# Patient Record
Sex: FEMALE | Race: WHITE | NOT HISPANIC OR LATINO | Employment: OTHER | URBAN - METROPOLITAN AREA
[De-identification: names, ages, dates, MRNs, and addresses within clinical notes are randomized per-mention and may not be internally consistent; named-entity substitution may affect disease eponyms.]

---

## 2017-01-04 ENCOUNTER — APPOINTMENT (OUTPATIENT)
Dept: PHYSICAL THERAPY | Facility: CLINIC | Age: 62
End: 2017-01-04
Payer: MEDICARE

## 2017-01-04 PROCEDURE — 97140 MANUAL THERAPY 1/> REGIONS: CPT

## 2017-01-05 ENCOUNTER — APPOINTMENT (OUTPATIENT)
Dept: PHYSICAL THERAPY | Facility: CLINIC | Age: 62
End: 2017-01-05
Payer: MEDICARE

## 2017-01-05 PROCEDURE — 97140 MANUAL THERAPY 1/> REGIONS: CPT

## 2017-01-06 ENCOUNTER — APPOINTMENT (OUTPATIENT)
Dept: PHYSICAL THERAPY | Facility: CLINIC | Age: 62
End: 2017-01-06
Payer: MEDICARE

## 2017-01-06 PROCEDURE — 97140 MANUAL THERAPY 1/> REGIONS: CPT

## 2017-01-09 ENCOUNTER — APPOINTMENT (OUTPATIENT)
Dept: PHYSICAL THERAPY | Facility: CLINIC | Age: 62
End: 2017-01-09
Payer: MEDICARE

## 2017-01-09 PROCEDURE — 97140 MANUAL THERAPY 1/> REGIONS: CPT

## 2017-01-11 ENCOUNTER — APPOINTMENT (OUTPATIENT)
Dept: PHYSICAL THERAPY | Facility: CLINIC | Age: 62
End: 2017-01-11
Payer: MEDICARE

## 2017-01-11 PROCEDURE — G8990 OTHER PT/OT CURRENT STATUS: HCPCS

## 2017-01-11 PROCEDURE — G8991 OTHER PT/OT GOAL STATUS: HCPCS

## 2017-01-11 PROCEDURE — 97140 MANUAL THERAPY 1/> REGIONS: CPT

## 2017-01-13 ENCOUNTER — APPOINTMENT (OUTPATIENT)
Dept: PHYSICAL THERAPY | Facility: CLINIC | Age: 62
End: 2017-01-13
Payer: MEDICARE

## 2017-01-13 PROCEDURE — 97140 MANUAL THERAPY 1/> REGIONS: CPT

## 2017-01-16 ENCOUNTER — APPOINTMENT (OUTPATIENT)
Dept: PHYSICAL THERAPY | Facility: CLINIC | Age: 62
End: 2017-01-16
Payer: MEDICARE

## 2017-01-18 ENCOUNTER — APPOINTMENT (OUTPATIENT)
Dept: PHYSICAL THERAPY | Facility: CLINIC | Age: 62
End: 2017-01-18
Payer: MEDICARE

## 2017-01-18 PROCEDURE — 97140 MANUAL THERAPY 1/> REGIONS: CPT

## 2017-01-20 ENCOUNTER — APPOINTMENT (OUTPATIENT)
Dept: PHYSICAL THERAPY | Facility: CLINIC | Age: 62
End: 2017-01-20
Payer: MEDICARE

## 2017-01-20 PROCEDURE — 97140 MANUAL THERAPY 1/> REGIONS: CPT

## 2017-01-23 ENCOUNTER — APPOINTMENT (OUTPATIENT)
Dept: PHYSICAL THERAPY | Facility: CLINIC | Age: 62
End: 2017-01-23
Payer: MEDICARE

## 2017-01-23 PROCEDURE — 97140 MANUAL THERAPY 1/> REGIONS: CPT

## 2017-01-24 ENCOUNTER — APPOINTMENT (OUTPATIENT)
Dept: PHYSICAL THERAPY | Facility: CLINIC | Age: 62
End: 2017-01-24
Payer: MEDICARE

## 2017-01-24 PROCEDURE — 97140 MANUAL THERAPY 1/> REGIONS: CPT

## 2017-01-31 ENCOUNTER — APPOINTMENT (OUTPATIENT)
Dept: PHYSICAL THERAPY | Facility: CLINIC | Age: 62
End: 2017-01-31
Payer: MEDICARE

## 2017-01-31 PROCEDURE — 97140 MANUAL THERAPY 1/> REGIONS: CPT

## 2017-02-01 ENCOUNTER — APPOINTMENT (OUTPATIENT)
Dept: PHYSICAL THERAPY | Facility: CLINIC | Age: 62
End: 2017-02-01
Payer: MEDICARE

## 2017-02-01 PROCEDURE — 97140 MANUAL THERAPY 1/> REGIONS: CPT

## 2017-02-02 ENCOUNTER — APPOINTMENT (OUTPATIENT)
Dept: PHYSICAL THERAPY | Facility: CLINIC | Age: 62
End: 2017-02-02
Payer: MEDICARE

## 2017-02-02 PROCEDURE — 97140 MANUAL THERAPY 1/> REGIONS: CPT

## 2017-09-07 ENCOUNTER — APPOINTMENT (OUTPATIENT)
Dept: PHYSICAL THERAPY | Facility: CLINIC | Age: 62
End: 2017-09-07
Payer: MEDICARE

## 2017-09-07 PROCEDURE — G8990 OTHER PT/OT CURRENT STATUS: HCPCS

## 2017-09-07 PROCEDURE — 97162 PT EVAL MOD COMPLEX 30 MIN: CPT

## 2017-09-07 PROCEDURE — G8991 OTHER PT/OT GOAL STATUS: HCPCS

## 2017-09-13 ENCOUNTER — APPOINTMENT (OUTPATIENT)
Dept: PHYSICAL THERAPY | Facility: CLINIC | Age: 62
End: 2017-09-13
Payer: MEDICARE

## 2017-09-13 PROCEDURE — 97140 MANUAL THERAPY 1/> REGIONS: CPT

## 2017-09-14 ENCOUNTER — APPOINTMENT (OUTPATIENT)
Dept: PHYSICAL THERAPY | Facility: CLINIC | Age: 62
End: 2017-09-14
Payer: MEDICARE

## 2017-09-14 PROCEDURE — 97140 MANUAL THERAPY 1/> REGIONS: CPT

## 2017-09-15 ENCOUNTER — APPOINTMENT (OUTPATIENT)
Dept: PHYSICAL THERAPY | Facility: CLINIC | Age: 62
End: 2017-09-15
Payer: MEDICARE

## 2017-09-15 PROCEDURE — 97140 MANUAL THERAPY 1/> REGIONS: CPT

## 2017-09-18 ENCOUNTER — APPOINTMENT (OUTPATIENT)
Dept: PHYSICAL THERAPY | Facility: CLINIC | Age: 62
End: 2017-09-18
Payer: MEDICARE

## 2017-09-18 PROCEDURE — 97140 MANUAL THERAPY 1/> REGIONS: CPT

## 2017-09-20 ENCOUNTER — APPOINTMENT (OUTPATIENT)
Dept: PHYSICAL THERAPY | Facility: CLINIC | Age: 62
End: 2017-09-20
Payer: MEDICARE

## 2017-09-20 PROCEDURE — 97140 MANUAL THERAPY 1/> REGIONS: CPT

## 2017-09-22 ENCOUNTER — APPOINTMENT (OUTPATIENT)
Dept: PHYSICAL THERAPY | Facility: CLINIC | Age: 62
End: 2017-09-22
Payer: MEDICARE

## 2017-09-22 PROCEDURE — 97140 MANUAL THERAPY 1/> REGIONS: CPT

## 2017-09-26 ENCOUNTER — APPOINTMENT (OUTPATIENT)
Dept: PHYSICAL THERAPY | Facility: CLINIC | Age: 62
End: 2017-09-26
Payer: MEDICARE

## 2017-09-26 PROCEDURE — 97140 MANUAL THERAPY 1/> REGIONS: CPT

## 2017-09-27 ENCOUNTER — APPOINTMENT (OUTPATIENT)
Dept: PHYSICAL THERAPY | Facility: CLINIC | Age: 62
End: 2017-09-27
Payer: MEDICARE

## 2017-09-27 PROCEDURE — 97140 MANUAL THERAPY 1/> REGIONS: CPT

## 2017-09-29 ENCOUNTER — APPOINTMENT (OUTPATIENT)
Dept: PHYSICAL THERAPY | Facility: CLINIC | Age: 62
End: 2017-09-29
Payer: MEDICARE

## 2017-09-29 PROCEDURE — 97140 MANUAL THERAPY 1/> REGIONS: CPT

## 2017-10-13 ENCOUNTER — APPOINTMENT (OUTPATIENT)
Dept: PHYSICAL THERAPY | Facility: CLINIC | Age: 62
End: 2017-10-13
Payer: MEDICARE

## 2017-10-13 PROCEDURE — G8990 OTHER PT/OT CURRENT STATUS: HCPCS

## 2017-10-13 PROCEDURE — 97140 MANUAL THERAPY 1/> REGIONS: CPT

## 2017-10-13 PROCEDURE — G8991 OTHER PT/OT GOAL STATUS: HCPCS

## 2017-10-16 ENCOUNTER — APPOINTMENT (OUTPATIENT)
Dept: PHYSICAL THERAPY | Facility: CLINIC | Age: 62
End: 2017-10-16
Payer: MEDICARE

## 2017-10-18 ENCOUNTER — APPOINTMENT (OUTPATIENT)
Dept: PHYSICAL THERAPY | Facility: CLINIC | Age: 62
End: 2017-10-18
Payer: MEDICARE

## 2017-10-18 PROCEDURE — 97140 MANUAL THERAPY 1/> REGIONS: CPT

## 2017-10-20 ENCOUNTER — APPOINTMENT (OUTPATIENT)
Dept: PHYSICAL THERAPY | Facility: CLINIC | Age: 62
End: 2017-10-20
Payer: MEDICARE

## 2017-10-20 PROCEDURE — 97140 MANUAL THERAPY 1/> REGIONS: CPT

## 2017-10-23 ENCOUNTER — APPOINTMENT (OUTPATIENT)
Dept: PHYSICAL THERAPY | Facility: CLINIC | Age: 62
End: 2017-10-23
Payer: MEDICARE

## 2017-10-23 PROCEDURE — 97140 MANUAL THERAPY 1/> REGIONS: CPT

## 2017-10-26 ENCOUNTER — APPOINTMENT (OUTPATIENT)
Dept: PHYSICAL THERAPY | Facility: CLINIC | Age: 62
End: 2017-10-26
Payer: MEDICARE

## 2017-10-26 PROCEDURE — 97140 MANUAL THERAPY 1/> REGIONS: CPT

## 2018-04-24 ENCOUNTER — TRANSCRIBE ORDERS (OUTPATIENT)
Dept: PHYSICAL THERAPY | Facility: CLINIC | Age: 63
End: 2018-04-24

## 2018-04-24 ENCOUNTER — EVALUATION (OUTPATIENT)
Dept: PHYSICAL THERAPY | Facility: CLINIC | Age: 63
End: 2018-04-24
Payer: MEDICARE

## 2018-04-24 DIAGNOSIS — M54.31 BILATERAL SCIATICA: Primary | ICD-10-CM

## 2018-04-24 DIAGNOSIS — I89.0 LYMPHEDEMA: Primary | ICD-10-CM

## 2018-04-24 DIAGNOSIS — M54.32 SCIATICA OF LEFT SIDE: ICD-10-CM

## 2018-04-24 DIAGNOSIS — M54.32 BILATERAL SCIATICA: Primary | ICD-10-CM

## 2018-04-24 PROCEDURE — G8979 MOBILITY GOAL STATUS: HCPCS | Performed by: PHYSICAL THERAPIST

## 2018-04-24 PROCEDURE — G8978 MOBILITY CURRENT STATUS: HCPCS | Performed by: PHYSICAL THERAPIST

## 2018-04-24 PROCEDURE — 97163 PT EVAL HIGH COMPLEX 45 MIN: CPT | Performed by: PHYSICAL THERAPIST

## 2018-04-24 NOTE — LETTER
2018    Yusef Mykel, [address] [facility] [address]    Patient: Francisca Chavez   YOB: 1955   Date of Visit: 2018     Encounter Diagnosis     ICD-10-CM    1  Lymphedema I89 0    2  Sciatica of left side M54 32        Dear Dr Yuri Mckeon:    Please review the attached Plan of Care from [facility] recent visit  Please verify that you agree therapy should continue by signing the attached document and sending it back to our office  If you have any questions or concerns, please don't hesitate to call  Sincerely,    Alex Arboleda, PT      Referring Provider:      I certify that I have read the below Plan of Care and certify the need for these services furnished under this plan of treatment while under my care  Yusef Mykel, [address] [facility] [address] [place] [address] [address]: [phone]          PT Evaluation     Today's date: 2018  Patient name: Francisca Chavez  : 1955  MRN: [id number]  Referring provider: Sheldon Cheatham MD  Dx:   Encounter Diagnosis     ICD-10-CM    1  Lymphedema I89 0    2  Sciatica of left side M54 32                   Assessment  Impairments: abnormal gait, activity intolerance, impaired physical strength, lacks appropriate home exercise program and pain with function  Other impairment: R UE lymphedema  Functional limitations: Requires SPC for amb  Assessment details: Pt presents w/ generalized B LE weakness, poor endurance, limited LB ROM and poor core activation and posturing along w/ L UE lymphedema of chronic nature related to breast CA hx   Pt had recent hospitalization and also home care PT to address ambulation limitations requiring cont'd use of SPC  Pt also anticipates upcoming partial bowel resection related to diverticulitis  L UE lymphedema has increased since last assessment predominantly affecting L elbow and proximal thru upper arm  Skilled PT is advised utilizing full CDT protocol including MLD and compression bandaging but holding abdominal sequence secondary to recent diagnosis of abdominal aortic aneurysm and diverticulitis hx  Skilled PT is also advised to manage chronic LBP and DDD w/ associated sciatica sx's w/ emphasis on posturing, core stab, and PN mngmt  Understanding of Dx/Px/POC: good   Prognosis: good    Goals  LB Goals:  1  Increase LB ROM >25% within 2 wks  2  Increase LB ROM >50% within 2-4 wks  3  Increase LB ROM to WNL's within 4 wks  4  Increase LB strength + 1 grade within 2-4 wks  5  Increase LB strength to WNL's within 4 wks  6  Reduce PN <1/10 w/ all activity within 4 wks  7  Return to normal functional activity potential within 4 wks  1  Pt I in self skin care within 2-4 wks  2  Pt I in self MLD within 4 wks  3  Pt I in don/doffing LE compression garments within 4 wks  4  Volume reduction of affected limb by >25% within 2-4 wks  5  Volume reduction of affected limb by >50% within 4 wks  6  Pt I in phase 2 CDT protocol including self MLD, skin maintenance, and don/doffing     garments by discharge    Plan  Patient would benefit from: skilled PT  Planned therapy interventions: manual therapy, massage, joint mobilization, strengthening, stretching, therapeutic exercise, patient education and home exercise program  Frequency: 3x week  Duration in visits: 15  Duration in weeks: 5  Treatment plan discussed with: patient and family        Subjective Evaluation    History of Present Illness  Mechanism of injury: Pt admits hospitalization for acute sciatica Feb 1-4, '18, followed by 1 spinal injection w/ PN mngmt w/ reduction of PN severity  Home care PT initiated to improved amb, strengthening, and mobility w/ use of SPC, now d/c since 4-23-18  Primary c/o at present is 5/10 "overall" Pn,  admitting c/s Pn, LBP, and PN in R UE (related to lymphedema)  L LE PN also cited related to sciatica sx's   Pt utilizing lidocaine patches to address persistent c/s Pn   Pt reports anticipated surgical intervention to manage diverticulitis in the upcoming future  Primary concerns are R UE lymphedema management, L LE sciatica, LB PN and generalized weakness, need of SPC for amb due to poor endurance  Quality of life: poor    Pain  Current pain ratin  At best pain ratin  At worst pain ratin  Location: LB, C/S, L LE, R UE  Quality: dull ache, discomfort, sharp, squeezing, tight, radiating and needle-like  Aggravating factors: walking, standing and nothing  Progression: improved      Diagnostic Tests  MRI studies: abnormal  Treatments  Previous treatment: home therapy  Discharged from (in last 30 days): home health care  Patient Goals  Patient goals for therapy: increased strength, independence with ADLs/IADLs, return to sport/leisure activities, decreased pain and decreased edema  Patient goal: Improve endurance      Flowsheet Rows    Flowsheet Row Most Recent Value   girth measurments   Extremity   Upper extremity   Index Finger   L Index Finger Initial Girth  5 25 cm   R Index Finger Initial Girth  5 5 cm   Palmar Crease   L Palmar Crease Initial Girth  17 5 cm   R Palmar Crease Initial Girth  17 5 cm   Wrist   L Wrist Initial Girth  14 5 cm   R Wrist Initial Girth  15 cm   W+10cm   L W+10cm Initial Girth  21 5 cm   R W+10cm Initial Girth  21 5 cm   W+20cm   L W+20cm Initial Girth  23 75 cm   R W+20cm Initial Girth  23 75 cm   W+30cm   L W+30cm Initial Girth  29 25 cm   R W+30cm Initial Girth  30 5 cm   Elbow Joint   L Elbow Joint Initial Girth  23 cm   R Elbow Joint Initial Girth  23 5 cm   W+40cm   L W+40cm Initial Girth  31 cm   R W+40cm Initial Girth  34 5 cm            Objective     Palpation     Additional Palpation Details  Moderate ttp B iliac crest into distal LB paraspinals w/ mod restriction B PSIS region       Active Range of Motion     Additional Active Range of Motion Details  LB flexion WNL  LB extension limited 15%  LB SB limited 25% L and R    Muscle Activation     Additional Muscle Activation Details  Poor core utilization w/ limited LB extensor and abdominal strength 3+/5      General Comments     Ankle/Foot Comments   Physical assessment:  Palpation: mild heaviness to R upper arm  Skin Mobility: Mild restriction and fibrosis R upper arm  Skin color: WNL, minimal redness  Pitting: Minimal  Wound presence: none  Wound size: n/a  Wound color: n/a  Stemmer's Sign: +  Gait assessment: WNL w/ SPC  Transfer status:  WNL  Ability to don/doff clothing/garments: I w/ mild difficulty      Flowsheet Rows    Flowsheet Row Most Recent Value   PT/OT G-Codes   Current Score  33   Projected Score  46   FOTO information reviewed  Yes   Assessment Type  Evaluation   G code set  Mobility: Walking & Moving Around   Mobility: Walking and Moving Around Current Status ()  CL   Mobility: Walking and Moving Around Goal Status ()  CK          Precautions: R UE lymphedema    Daily Treatment Diary     Manual                                                                                   Exercise Diary                                                                                                                                                                                                                                                                                      Modalities

## 2018-04-24 NOTE — PROGRESS NOTES
PT Evaluation     Today's date: 2018  Patient name: Eleni Sales  : 1955  MRN: [id number]  Referring provider: Samina Yun MD  Dx:   Encounter Diagnosis     ICD-10-CM    1  Lymphedema I89 0    2  Sciatica of left side M54 32                   Assessment  Impairments: abnormal gait, activity intolerance, impaired physical strength, lacks appropriate home exercise program and pain with function  Other impairment: R UE lymphedema  Functional limitations: Requires SPC for amb  Assessment details: Pt presents w/ generalized B LE weakness, poor endurance, limited LB ROM and poor core activation and posturing along w/ L UE lymphedema of chronic nature related to breast CA hx   Pt had recent hospitalization and also home care PT to address ambulation limitations requiring cont'd use of SPC  Pt also anticipates upcoming partial bowel resection related to diverticulitis  L UE lymphedema has increased since last assessment predominantly affecting L elbow and proximal thru upper arm  Skilled PT is advised utilizing full CDT protocol including MLD and compression bandaging but holding abdominal sequence secondary to recent diagnosis of abdominal aortic aneurysm and diverticulitis hx  Skilled PT is also advised to manage chronic LBP and DDD w/ associated sciatica sx's w/ emphasis on posturing, core stab, and PN mngmt  Understanding of Dx/Px/POC: good   Prognosis: good    Goals  LB Goals:  1  Increase LB ROM >25% within 2 wks  2  Increase LB ROM >50% within 2-4 wks  3  Increase LB ROM to WNL's within 4 wks  4  Increase LB strength + 1 grade within 2-4 wks  5  Increase LB strength to WNL's within 4 wks  6  Reduce PN <1/10 w/ all activity within 4 wks  7  Return to normal functional activity potential within 4 wks  1  Pt I in self skin care within 2-4 wks  2  Pt I in self MLD within 4 wks  3  Pt I in don/doffing LE compression garments within 4 wks  4   Volume reduction of affected limb by >25% within 2-4 wks  5  Volume reduction of affected limb by >50% within 4 wks  6  Pt I in phase 2 CDT protocol including self MLD, skin maintenance, and don/doffing     garments by discharge    Plan  Patient would benefit from: skilled PT  Planned therapy interventions: manual therapy, massage, joint mobilization, strengthening, stretching, therapeutic exercise, patient education and home exercise program  Frequency: 3x week  Duration in visits: 15  Duration in weeks: 5  Treatment plan discussed with: patient and family        Subjective Evaluation    History of Present Illness  Mechanism of injury: Pt admits hospitalization for acute sciatica , followed by 1 spinal injection w/ PN mngmt w/ reduction of PN severity  Home care PT initiated to improved amb, strengthening, and mobility w/ use of SPC, now d/c since 18  Primary c/o at present is 5/10 "overall" Pn,  admitting c/s Pn, LBP, and PN in R UE (related to lymphedema)  L LE PN also cited related to sciatica sx's  Pt utilizing lidocaine patches to address persistent c/s Pn   Pt reports anticipated surgical intervention to manage diverticulitis in the upcoming future  Primary concerns are R UE lymphedema management, L LE sciatica, LB PN and generalized weakness, need of SPC for amb due to poor endurance     Quality of life: poor    Pain  Current pain ratin  At best pain ratin  At worst pain ratin  Location: LB, C/S, L LE, R UE  Quality: dull ache, discomfort, sharp, squeezing, tight, radiating and needle-like  Aggravating factors: walking, standing and nothing  Progression: improved      Diagnostic Tests  MRI studies: abnormal  Treatments  Previous treatment: home therapy  Discharged from (in last 30 days): home health care  Patient Goals  Patient goals for therapy: increased strength, independence with ADLs/IADLs, return to sport/leisure activities, decreased pain and decreased edema  Patient goal: Improve endurance      Flowsheet Rows    Flowsheet Row Most Recent Value   girth measurments   Extremity   Upper extremity   Index Finger   L Index Finger Initial Girth  5 25 cm   R Index Finger Initial Girth  5 5 cm   Palmar Crease   L Palmar Crease Initial Girth  17 5 cm   R Palmar Crease Initial Girth  17 5 cm   Wrist   L Wrist Initial Girth  14 5 cm   R Wrist Initial Girth  15 cm   W+10cm   L W+10cm Initial Girth  21 5 cm   R W+10cm Initial Girth  21 5 cm   W+20cm   L W+20cm Initial Girth  23 75 cm   R W+20cm Initial Girth  23 75 cm   W+30cm   L W+30cm Initial Girth  29 25 cm   R W+30cm Initial Girth  30 5 cm   Elbow Joint   L Elbow Joint Initial Girth  23 cm   R Elbow Joint Initial Girth  23 5 cm   W+40cm   L W+40cm Initial Girth  31 cm   R W+40cm Initial Girth  34 5 cm            Objective     Palpation     Additional Palpation Details  Moderate ttp B iliac crest into distal LB paraspinals w/ mod restriction B PSIS region  Active Range of Motion     Additional Active Range of Motion Details  LB flexion WNL  LB extension limited 15%  LB SB limited 25% L and R    Muscle Activation     Additional Muscle Activation Details  Poor core utilization w/ limited LB extensor and abdominal strength 3+/5      General Comments     Ankle/Foot Comments   Physical assessment:  Palpation: mild heaviness to R upper arm  Skin Mobility: Mild restriction and fibrosis R upper arm  Skin color: WNL, minimal redness  Pitting: Minimal  Wound presence: none  Wound size: n/a  Wound color: n/a  Stemmer's Sign: +  Gait assessment: WNL w/ SPC  Transfer status:  WNL  Ability to don/doff clothing/garments: I w/ mild difficulty      Flowsheet Rows    Flowsheet Row Most Recent Value   PT/OT G-Codes   Current Score  33   Projected Score  46   FOTO information reviewed  Yes   Assessment Type  Evaluation   G code set  Mobility: Walking & Moving Around   Mobility: Walking and Moving Around Current Status ()  CL   Mobility: Walking and Moving Around Goal Status ()  CK Precautions: R UE lymphedema    Daily Treatment Diary     Manual                                                                                   Exercise Diary                                                                                                                                                                                                                                                                                      Modalities

## 2018-04-24 NOTE — LETTER
2018    [address]  [address]    Patient: Ramez Padilla   YOB: 1955   Date of Visit: 2018     Encounter Diagnosis     ICD-10-CM    1  Lymphedema I89 0    2  Sciatica of left side M54 32        Dear Dr Tatiana Olguin:    Please review the attached Plan of Care from [facility] recent visit  Please verify that you agree therapy should continue by signing the attached document and sending it back to our office  If you have any questions or concerns, please don't hesitate to call  Sincerely,    Chikis Lira, PT      Referring Provider:      I certify that I have read the below Plan of Care and certify the need for these services furnished under this plan of treatment while under my care  [address]  [place] [address]: [phone]          PT Evaluation     Today's date: 2018  Patient name: Ramez Padilla  : 1955  MRN: [id number]  Referring provider: Carolyn Waller MD  Dx:   Encounter Diagnosis     ICD-10-CM    1  Lymphedema I89 0    2  Sciatica of left side M54 32                   Assessment  Impairments: abnormal gait, activity intolerance, impaired physical strength, lacks appropriate home exercise program and pain with function  Other impairment: R UE lymphedema  Functional limitations: Requires SPC for amb  Assessment details: Pt presents w/ generalized B LE weakness, poor endurance, limited LB ROM and poor core activation and posturing along w/ L UE lymphedema of chronic nature related to breast CA hx   Pt had recent hospitalization and also home care PT to address ambulation limitations requiring cont'd use of SPC  Pt also anticipates upcoming partial bowel resection related to diverticulitis  L UE lymphedema has increased since last assessment predominantly affecting L elbow and proximal thru upper arm    Skilled PT is advised utilizing full CDT protocol including MLD and compression bandaging but holding abdominal sequence secondary to recent diagnosis of abdominal aortic aneurysm and diverticulitis hx  Skilled PT is also advised to manage chronic LBP and DDD w/ associated sciatica sx's w/ emphasis on posturing, core stab, and PN mngmt  Understanding of Dx/Px/POC: good   Prognosis: good    Goals  LB Goals:  1  Increase LB ROM >25% within 2 wks  2  Increase LB ROM >50% within 2-4 wks  3  Increase LB ROM to WNL's within 4 wks  4  Increase LB strength + 1 grade within 2-4 wks  5  Increase LB strength to WNL's within 4 wks  6  Reduce PN <1/10 w/ all activity within 4 wks  7  Return to normal functional activity potential within 4 wks  1  Pt I in self skin care within 2-4 wks  2  Pt I in self MLD within 4 wks  3  Pt I in don/doffing LE compression garments within 4 wks  4  Volume reduction of affected limb by >25% within 2-4 wks  5  Volume reduction of affected limb by >50% within 4 wks  6  Pt I in phase 2 CDT protocol including self MLD, skin maintenance, and don/doffing     garments by discharge    Plan  Patient would benefit from: skilled PT  Planned therapy interventions: manual therapy, massage, joint mobilization, strengthening, stretching, therapeutic exercise, patient education and home exercise program  Frequency: 3x week  Duration in visits: 15  Duration in weeks: 5  Treatment plan discussed with: patient and family        Subjective Evaluation    History of Present Illness  Mechanism of injury: Pt admits hospitalization for acute sciatica Feb 1-4, '18, followed by 1 spinal injection w/ PN mngmt w/ reduction of PN severity  Home care PT initiated to improved amb, strengthening, and mobility w/ use of SPC, now d/c since 4-23-18  Primary c/o at present is 5/10 "overall" Pn,  admitting c/s Pn, LBP, and PN in R UE (related to lymphedema)  L LE PN also cited related to sciatica sx's   Pt utilizing lidocaine patches to address persistent c/s Pn   Pt reports anticipated surgical intervention to manage diverticulitis in the upcoming future  Primary concerns are R UE lymphedema management, L LE sciatica, LB PN and generalized weakness, need of SPC for amb due to poor endurance  Quality of life: poor    Pain  Current pain ratin  At best pain ratin  At worst pain ratin  Location: LB, C/S, L LE, R UE  Quality: dull ache, discomfort, sharp, squeezing, tight, radiating and needle-like  Aggravating factors: walking, standing and nothing  Progression: improved      Diagnostic Tests  MRI studies: abnormal  Treatments  Previous treatment: home therapy  Discharged from (in last 30 days): home health care  Patient Goals  Patient goals for therapy: increased strength, independence with ADLs/IADLs, return to sport/leisure activities, decreased pain and decreased edema  Patient goal: Improve endurance      Flowsheet Rows    Flowsheet Row Most Recent Value   girth measurments   Extremity   Upper extremity   Index Finger   L Index Finger Initial Girth  5 25 cm   R Index Finger Initial Girth  5 5 cm   Palmar Crease   L Palmar Crease Initial Girth  17 5 cm   R Palmar Crease Initial Girth  17 5 cm   Wrist   L Wrist Initial Girth  14 5 cm   R Wrist Initial Girth  15 cm   W+10cm   L W+10cm Initial Girth  21 5 cm   R W+10cm Initial Girth  21 5 cm   W+20cm   L W+20cm Initial Girth  23 75 cm   R W+20cm Initial Girth  23 75 cm   W+30cm   L W+30cm Initial Girth  29 25 cm   R W+30cm Initial Girth  30 5 cm   Elbow Joint   L Elbow Joint Initial Girth  23 cm   R Elbow Joint Initial Girth  23 5 cm   W+40cm   L W+40cm Initial Girth  31 cm   R W+40cm Initial Girth  34 5 cm            Objective     Palpation     Additional Palpation Details  Moderate ttp B iliac crest into distal LB paraspinals w/ mod restriction B PSIS region       Active Range of Motion     Additional Active Range of Motion Details  LB flexion WNL  LB extension limited 15%  LB SB limited 25% L and R    Muscle Activation Additional Muscle Activation Details  Poor core utilization w/ limited LB extensor and abdominal strength 3+/5      General Comments     Ankle/Foot Comments   Physical assessment:  Palpation: mild heaviness to R upper arm  Skin Mobility: Mild restriction and fibrosis R upper arm  Skin color: WNL, minimal redness  Pitting: Minimal  Wound presence: none  Wound size: n/a  Wound color: n/a  Stemmer's Sign: +  Gait assessment: WNL w/ SPC  Transfer status:  WNL  Ability to don/doff clothing/garments: I w/ mild difficulty      Flowsheet Rows    Flowsheet Row Most Recent Value   PT/OT G-Codes   Current Score  33   Projected Score  46   FOTO information reviewed  Yes   Assessment Type  Evaluation   G code set  Mobility: Walking & Moving Around   Mobility: Walking and Moving Around Current Status ()  CL   Mobility: Walking and Moving Around Goal Status ()  CK          Precautions: R UE lymphedema    Daily Treatment Diary     Manual                                                                                   Exercise Diary                                                                                                                                                                                                                                                                                      Modalities

## 2018-04-27 ENCOUNTER — OFFICE VISIT (OUTPATIENT)
Dept: PHYSICAL THERAPY | Facility: CLINIC | Age: 63
End: 2018-04-27
Payer: MEDICARE

## 2018-04-27 DIAGNOSIS — M54.32 SCIATICA OF LEFT SIDE: ICD-10-CM

## 2018-04-27 DIAGNOSIS — I89.0 LYMPHEDEMA: Primary | ICD-10-CM

## 2018-04-27 PROCEDURE — 97110 THERAPEUTIC EXERCISES: CPT | Performed by: PHYSICAL THERAPIST

## 2018-04-27 PROCEDURE — 97112 NEUROMUSCULAR REEDUCATION: CPT | Performed by: PHYSICAL THERAPIST

## 2018-04-27 NOTE — PROGRESS NOTES
Daily Note     Today's date: 2018  Patient name: Solitario Ray  : 1955  MRN: [id number]  Referring provider: Marlen Caro MD  Dx:   Encounter Diagnosis     ICD-10-CM    1  Lymphedema I89 0    2  Sciatica of left side M54 32                   Subjective: Pt reports her overall PN at 5/10 and LBP at 3/10 this day stating 'it's not that bad in my back but my fibromyalgia is worse w/ the weather today "      Objective: See treatment diary below      Assessment: Tolerated treatment well  Patient would benefit from continued PT  Good tolerance to LB TE, however significant sore strength deficits remain  Plan: Continue per plan of care       Precautions: R UE Lymphedema    Daily Treatment Diary     Manual                                                                                   Exercise Diary              LTR 3s x10            PPT 3s x15            Abd isometric + glute set 3s x15            Abd isometric + march x10            Scapular retraction 3s x10            UT str 10s x3            C/s rotation 5s x5                                                                                                                                                                                         Modalities

## 2018-04-30 ENCOUNTER — OFFICE VISIT (OUTPATIENT)
Dept: PHYSICAL THERAPY | Facility: CLINIC | Age: 63
End: 2018-04-30
Payer: MEDICARE

## 2018-04-30 DIAGNOSIS — I89.0 LYMPHEDEMA: Primary | ICD-10-CM

## 2018-04-30 PROCEDURE — 97140 MANUAL THERAPY 1/> REGIONS: CPT | Performed by: PHYSICAL THERAPIST

## 2018-04-30 NOTE — PROGRESS NOTES
Daily Note     Today's date: 2018  Patient name: Eliceo Fischer  : 1955  MRN: [id number]  Referring provider: Arun Chester MD  Dx:   Encounter Diagnosis     ICD-10-CM    1  Lymphedema I89 0                   Subjective: Pt reports arm feels a little heavy this AM, fair response to LB TE added LV  Objective: See treatment diary below      Assessment: Tolerated treatment well  Patient would benefit from continued PT  MLD R UE performed this day + compression bandaging  Noted dryness posterior elbow within region of olecranon, advised pt to monitor and utilize lotion  Good response to tx noted  Plan: Continue per plan of care       Precautions: R UE Lymphedema    Daily Treatment Diary     Manual  30           MLD R UE  x45'           Compression bandaging R UE  x10'                                                      Exercise Diary              LTR 3s x10            PPT 3s x15            Abd isometric + glute set 3s x15            Abd isometric + march x10            Scapular retraction 3s x10            UT str 10s x3            C/s rotation 5s x5                                                                                                                                                                                         Modalities

## 2018-05-03 ENCOUNTER — OFFICE VISIT (OUTPATIENT)
Dept: PHYSICAL THERAPY | Facility: CLINIC | Age: 63
End: 2018-05-03
Payer: MEDICARE

## 2018-05-03 DIAGNOSIS — M54.32 SCIATICA OF LEFT SIDE: ICD-10-CM

## 2018-05-03 DIAGNOSIS — I89.0 LYMPHEDEMA: Primary | ICD-10-CM

## 2018-05-03 PROCEDURE — 97140 MANUAL THERAPY 1/> REGIONS: CPT | Performed by: PHYSICAL THERAPIST

## 2018-05-03 NOTE — PROGRESS NOTES
Daily Note     Today's date: 5/3/2018  Patient name: Arun Guzman  : 1955  MRN: [id number]  Referring provider: Denisha Bailey MD  Dx:   Encounter Diagnosis     ICD-10-CM    1  Lymphedema I89 0    2  Sciatica of left side M54 32                   Subjective: Pt reports arm responding to tx, hand and forearm feels good, but feels "heaviest thru axilla and upper arm region to shldr "    Objective: See treatment diary below      Assessment: Tolerated treatment well  Patient would benefit from continued PT  R hand and forearm doing well, mild ttp R upper arm - posteriorly  Plan to measure next wk  Plan: Continue per plan of care       Precautions: R UE Lymphedema    Daily Treatment Diary     Manual   5-3          MLD R UE  x45' X40'          Compression bandaging R UE  x10' X10'                                                     Exercise Diary              LTR 3s x10            PPT 3s x15            Abd isometric + glute set 3s x15            Abd isometric + march x10            Scapular retraction 3s x10            UT str 10s x3            C/s rotation 5s x5                                                                                                                                                                                         Modalities

## 2018-05-04 ENCOUNTER — OFFICE VISIT (OUTPATIENT)
Dept: PHYSICAL THERAPY | Facility: CLINIC | Age: 63
End: 2018-05-04
Payer: MEDICARE

## 2018-05-04 DIAGNOSIS — M54.32 SCIATICA OF LEFT SIDE: Primary | ICD-10-CM

## 2018-05-04 PROCEDURE — 97110 THERAPEUTIC EXERCISES: CPT | Performed by: PHYSICAL THERAPIST

## 2018-05-04 NOTE — PROGRESS NOTES
Daily Note     Today's date: 2018  Patient name: Jimmy Ordoñez  : 1955  MRN: [id number]  Referring provider: Edinson Acosta MD  Dx:   Encounter Diagnosis     ICD-10-CM    1  Sciatica of left side M54 32                   Subjective: General fatigue and PN this day stating it was tough to get OOB  Objective: See treatment diary below      Assessment: Tolerated treatment well  Patient would benefit from continued PT  Good tolerance to LB TE, Core challenged this day w/ fair response, minimal PN increase, advance as melvina  Plan: Continue per plan of care       Precautions: R UE Lymphedema    Daily Treatment Diary     Manual   5-4                                                                                Exercise Diary   5-4           LTR 3s x10 3s x10           PPT 3s x15 3s x30           Abd isometric + glute set 3s x15 3s x30           Abd isometric + march x10 x15           Scapular retraction 3s x10            UT str 10s x3            C/s rotation 5s x5            Bridges  3s x15           SKC  5s x5           Abd iso + ball add sqz  3s x30           Iso hip flexion  5s x5                                                                                                                                    Modalities

## 2018-05-08 ENCOUNTER — OFFICE VISIT (OUTPATIENT)
Dept: PHYSICAL THERAPY | Facility: CLINIC | Age: 63
End: 2018-05-08
Payer: MEDICARE

## 2018-05-08 DIAGNOSIS — I89.0 LYMPHEDEMA: Primary | ICD-10-CM

## 2018-05-08 PROCEDURE — 97140 MANUAL THERAPY 1/> REGIONS: CPT | Performed by: PHYSICAL THERAPIST

## 2018-05-08 NOTE — PROGRESS NOTES
Daily Note     Today's date: 2018  Patient name: Laz Martinez  : 1955  MRN: [id number]  Referring provider: Radha Ng MD  Dx:   Encounter Diagnosis     ICD-10-CM    1  Lymphedema I89 0                   Subjective: Pt maintaining hep for LB  Pt reports arm feels a bit heavy in axilla, also noticing some heaviness L posterior shldr       Objective: See treatment diary below      Assessment: Tolerated treatment well  Patient would benefit from continued PT  Overall vol reduction realized t/o R UE, assessed grossly w/ mild heaviness noted R axilla region, pot citing a mild ttp within region of R shldr       Plan: Continue per plan of care       Precautions: R UE Lymphedema    Daily Treatment Diary     Manual   5- 5-8          R UE MLD   x50'          R UE bandaging   x10'                                                     Exercise Diary   5-4           LTR 3s x10 3s x10           PPT 3s x15 3s x30           Abd isometric + glute set 3s x15 3s x30           Abd isometric + march x10 x15           Scapular retraction 3s x10            UT str 10s x3            C/s rotation 5s x5            Bridges  3s x15           SKC  5s x5           Abd iso + ball add sqz  3s x30           Iso hip flexion  5s x5                                                                                                                                    Modalities

## 2018-05-09 ENCOUNTER — OFFICE VISIT (OUTPATIENT)
Dept: PHYSICAL THERAPY | Facility: CLINIC | Age: 63
End: 2018-05-09
Payer: MEDICARE

## 2018-05-09 DIAGNOSIS — I89.0 LYMPHEDEMA: Primary | ICD-10-CM

## 2018-05-09 DIAGNOSIS — M54.32 SCIATICA OF LEFT SIDE: ICD-10-CM

## 2018-05-09 PROCEDURE — 97140 MANUAL THERAPY 1/> REGIONS: CPT | Performed by: PHYSICAL THERAPIST

## 2018-05-09 NOTE — PROGRESS NOTES
Daily Note     Today's date: 2018  Patient name: Lidia Bonilla  : 1955  MRN: [id number]  Referring provider: Geno Childs MD  Dx:   Encounter Diagnosis     ICD-10-CM    1  Lymphedema I89 0    2  Sciatica of left side M54 32                   Subjective: Pt states "I think the upper arm is doing better, less heaviness today "     Objective: See treatment diary below      Assessment: Tolerated treatment well  Patient would benefit from continued PT  Reduced vol noted thru R UE - assessed grossly, however redness noted, lines in skin associated w/ compression garments, reduced post MLD, will monitor  No other negative prognosticators noted  Plan: Continue per plan of care       Precautions: R UE Lymphedema    Daily Treatment Diary     Manual   5-4 5-8 5-9         R UE MLD   x50' x50'         R UE bandaging   x10' x10'                                                    Exercise Diary   5-4           LTR 3s x10 3s x10           PPT 3s x15 3s x30           Abd isometric + glute set 3s x15 3s x30           Abd isometric + march x10 x15           Scapular retraction 3s x10            UT str 10s x3            C/s rotation 5s x5            Bridges  3s x15           SKC  5s x5           Abd iso + ball add sqz  3s x30           Iso hip flexion  5s x5                                                                                                                                    Modalities

## 2018-05-10 ENCOUNTER — OFFICE VISIT (OUTPATIENT)
Dept: PHYSICAL THERAPY | Facility: CLINIC | Age: 63
End: 2018-05-10
Payer: MEDICARE

## 2018-05-10 DIAGNOSIS — I89.0 LYMPHEDEMA: Primary | ICD-10-CM

## 2018-05-10 DIAGNOSIS — M54.32 SCIATICA OF LEFT SIDE: ICD-10-CM

## 2018-05-10 PROCEDURE — 97110 THERAPEUTIC EXERCISES: CPT | Performed by: PHYSICAL THERAPIST

## 2018-05-10 NOTE — PROGRESS NOTES
Daily Note     Today's date: 5/10/2018  Patient name: Ramírez Pruitt  : 1955  MRN: [id number]  Referring provider: Amber Juarez MD  Dx:   Encounter Diagnosis     ICD-10-CM    1  Lymphedema I89 0    2  Sciatica of left side M54 32                   Subjective: Pt reports LBP improved overall citing also less sx's into L LE  Objective: See treatment diary below      Assessment: Tolerated treatment well  Patient would benefit from continued PT  Updated hep for core stab including seated on ball TE as pt has ball available at home for use  + response to L LE slump str, mild dural restriction noted  Plan: Continue per plan of care       Precautions: R UE Lymphedema    Daily Treatment Diary     Manual   5-4 5-8 5-9 5-10        R UE MLD   x50' x50'         R UE bandaging   x10' x10'                                                    Exercise Diary   5-4   5-10        LTR 3s x10 3s x10   5s x10        PPT 3s x15 3s x30   3s x30        Abd isometric + glute set 3s x15 3s x30   3s x30        Abd isometric + march x10 x15   x30        Scapular retraction 3s x10    3s x30        UT str 10s x3            C/s rotation 5s x5            Bridges  3s x15   3s x30 w/ hip add ball sqz        SKC  5s x5           Abd iso + ball add sqz  3s x30   3s x30        Iso hip flexion  5s x5   5s x15        Seated LTR     5s x10        Slump str L LE     15s x5                                                                                                       Modalities

## 2018-05-14 ENCOUNTER — OFFICE VISIT (OUTPATIENT)
Dept: PHYSICAL THERAPY | Facility: CLINIC | Age: 63
End: 2018-05-14
Payer: MEDICARE

## 2018-05-14 DIAGNOSIS — I89.0 LYMPHEDEMA: Primary | ICD-10-CM

## 2018-05-14 PROCEDURE — 97140 MANUAL THERAPY 1/> REGIONS: CPT | Performed by: PHYSICAL THERAPIST

## 2018-05-14 NOTE — PROGRESS NOTES
Daily Note     Today's date: 2018  Patient name: Eleni Sales  : 1955  MRN: [id number]  Referring provider: Samina Yun MD  Dx:   Encounter Diagnosis     ICD-10-CM    1  Lymphedema I89 0                   Subjective: C/o PN related to fibromyalgia, admits arm doing well, maintaining garments, just feels "Weird along elbow "     Objective: See treatment diary below    Assessment: Tolerated treatment well  Patient would benefit from continued PT  Good overall reduction realized t/o R UE w/ good mobility of skin, mild PN within crease of elbow, skin integrity good  Plan: Continue per plan of care       Precautions: R UE Lymphedema    Daily Treatment Diary     Manual   5-4 5-8 5-9 5-10 5-14       R UE MLD   x50' x50'  x45'       R UE bandaging   x10' x10'  x10'                                                  Exercise Diary   5-4   5-10        LTR 3s x10 3s x10   5s x10        PPT 3s x15 3s x30   3s x30        Abd isometric + glute set 3s x15 3s x30   3s x30        Abd isometric + march x10 x15   x30        Scapular retraction 3s x10    3s x30        UT str 10s x3            C/s rotation 5s x5            Bridges  3s x15   3s x30 w/ hip add ball sqz        SKC  5s x5           Abd iso + ball add sqz  3s x30   3s x30        Iso hip flexion  5s x5   5s x15        Seated LTR     5s x10        Slump str L LE     15s x5                                                                                                       Modalities

## 2018-05-15 ENCOUNTER — OFFICE VISIT (OUTPATIENT)
Dept: PHYSICAL THERAPY | Facility: CLINIC | Age: 63
End: 2018-05-15
Payer: MEDICARE

## 2018-05-15 DIAGNOSIS — M54.32 SCIATICA OF LEFT SIDE: ICD-10-CM

## 2018-05-15 DIAGNOSIS — I89.0 LYMPHEDEMA: Primary | ICD-10-CM

## 2018-05-15 PROCEDURE — 97140 MANUAL THERAPY 1/> REGIONS: CPT | Performed by: PHYSICAL THERAPIST

## 2018-05-15 NOTE — PROGRESS NOTES
Daily Note     Today's date: 5/15/2018  Patient name: Arun Guzmna  : 1955  MRN: [id number]  Referring provider: Denisha Bailey MD  Dx:   Encounter Diagnosis     ICD-10-CM    1  Lymphedema I89 0    2  Sciatica of left side M54 32                   Subjective: Reports bandaging was great yesterday, noticing reduction  Objective: See treatment diary below    Assessment: Tolerated treatment well  Patient would benefit from continued PT  Good response to tx cont'd, overall vol reduction noted w/ improved skin mobility thru upper arm  Plan to measure next wk  Plan: Continue per plan of care       Precautions: R UE Lymphedema    Daily Treatment Diary     Manual   5-4 5-8 5-9 5-10 5-14 5-15      R UE MLD   x50' x50'  x45' x45'      R UE bandaging   x10' x10'  x10' x10'                                                 Exercise Diary   5-4   5-10        LTR 3s x10 3s x10   5s x10        PPT 3s x15 3s x30   3s x30        Abd isometric + glute set 3s x15 3s x30   3s x30        Abd isometric + march x10 x15   x30        Scapular retraction 3s x10    3s x30        UT str 10s x3            C/s rotation 5s x5            Bridges  3s x15   3s x30 w/ hip add ball sqz        SKC  5s x5           Abd iso + ball add sqz  3s x30   3s x30        Iso hip flexion  5s x5   5s x15        Seated LTR     5s x10        Slump str L LE     15s x5                                                                                                       Modalities

## 2018-05-17 ENCOUNTER — OFFICE VISIT (OUTPATIENT)
Dept: PHYSICAL THERAPY | Facility: CLINIC | Age: 63
End: 2018-05-17
Payer: MEDICARE

## 2018-05-17 DIAGNOSIS — M54.32 SCIATICA OF LEFT SIDE: Primary | ICD-10-CM

## 2018-05-17 PROCEDURE — 97112 NEUROMUSCULAR REEDUCATION: CPT | Performed by: PHYSICAL THERAPIST

## 2018-05-17 PROCEDURE — 97140 MANUAL THERAPY 1/> REGIONS: CPT | Performed by: PHYSICAL THERAPIST

## 2018-05-17 PROCEDURE — 97150 GROUP THERAPEUTIC PROCEDURES: CPT | Performed by: PHYSICAL THERAPIST

## 2018-05-17 NOTE — PROGRESS NOTES
Daily Note     Today's date: 2018  Patient name: Layne Chaudhari  : 1955  MRN: [id number]  Referring provider: Kacy Warren MD  Dx:   Encounter Diagnosis     ICD-10-CM    1  Sciatica of left side M54 32                   Subjective: Pain L leg improving  Noticing some pain in abdominals and also tightness in R lateral leg  Objective: See treatment diary below  Assessment: Needed frequent verbal and manual cueing to properly activate core musculature  Radicular pain improving  Advised pt her abdominal pain is most likely from muscular soreness due to exercise of a weak area  Plan: Continue per plan of care       Precautions: R UE Lymphedema    Daily Treatment Diary     Manual   5-4 5-8 5-9 5-10 5-14 5-15 5-17     R UE MLD   x50' x50'  x45' x45'      R UE bandaging   x10' x10'  x10' x10'                                                 Exercise Diary   5-4   5-10   5-17     LTR 3s x10 3s x10   5s x10   5 x 10     PPT 3s x15 3s x30   3s x30   3s x 30,+ TA     Abd isometric + glute set 3s x15 3s x30   3s x30   3s x 30     Abd isometric + march x10 x15   x30   X 30     Scapular retraction 3s x10    3s x30   3s x 30     UT str 10s x3            C/s rotation 5s x5            Bridges  3s x15   3s x30 w/ hip add ball sqz   3s x 30 with adductor squeeze     SKC  5s x5           Abd iso + ball add sqz  3s x30   3s x30   3sec x 30     Iso hip flexion  5s x5   5s x15   5s x 15     Seated LTR     5s x10   5s x 10     Slump str L LE     15s x5   15s x 5 bilat                                                                                                    Modalities

## 2018-05-21 ENCOUNTER — OFFICE VISIT (OUTPATIENT)
Dept: PHYSICAL THERAPY | Facility: CLINIC | Age: 63
End: 2018-05-21
Payer: MEDICARE

## 2018-05-21 DIAGNOSIS — I89.0 LYMPHEDEMA: Primary | ICD-10-CM

## 2018-05-21 PROCEDURE — 97140 MANUAL THERAPY 1/> REGIONS: CPT | Performed by: PHYSICAL THERAPIST

## 2018-05-21 NOTE — PROGRESS NOTES
Daily Note     Today's date: 2018  Patient name: Durga Gonzalez  : 1955  MRN: [id number]  Referring provider: Rosita Kaufman MD  Dx:   Encounter Diagnosis     ICD-10-CM    1  Lymphedema I89 0                   Subjective: Pt reports it was a rough few days last wk w/ bad weather in LB and also feeling heavy in R arm, now admits LBP reducing and also arm feeling "Okay" -     Objective: See treatment diary below  Assessment: Pt has mild irriation posterior aspect of UE w/ mild level of edema present posterior upper arm towards lateral scapular border  Plan: Continue per plan of care       Precautions: R UE Lymphedema    Daily Treatment Diary     Manual   5-4 5-8 5-9 5-10 5-14 5-15 5-17 5-21    R UE MLD   x50' x50'  x45' x45'  x40'    R UE bandaging   x10' x10'  x10' x10'  x10'                                               Exercise Diary   5-4   5-10   5-17     LTR 3s x10 3s x10   5s x10   5 x 10     PPT 3s x15 3s x30   3s x30   3s x 30,+ TA     Abd isometric + glute set 3s x15 3s x30   3s x30   3s x 30     Abd isometric + march x10 x15   x30   X 30     Scapular retraction 3s x10    3s x30   3s x 30     UT str 10s x3            C/s rotation 5s x5            Bridges  3s x15   3s x30 w/ hip add ball sqz   3s x 30 with adductor squeeze     SKC  5s x5           Abd iso + ball add sqz  3s x30   3s x30   3sec x 30     Iso hip flexion  5s x5   5s x15   5s x 15     Seated LTR     5s x10   5s x 10     Slump str L LE     15s x5   15s x 5 bilat                                                                                                    Modalities

## 2018-05-24 ENCOUNTER — OFFICE VISIT (OUTPATIENT)
Dept: PHYSICAL THERAPY | Facility: CLINIC | Age: 63
End: 2018-05-24
Payer: MEDICARE

## 2018-05-24 DIAGNOSIS — M54.32 SCIATICA OF LEFT SIDE: ICD-10-CM

## 2018-05-24 DIAGNOSIS — I89.0 LYMPHEDEMA: Primary | ICD-10-CM

## 2018-05-24 PROCEDURE — 97140 MANUAL THERAPY 1/> REGIONS: CPT | Performed by: PHYSICAL THERAPIST

## 2018-05-24 NOTE — PROGRESS NOTES
Daily Note     Today's date: 2018  Patient name: Eulalio Dimas  : 1955  MRN: [id number]  Referring provider: Asad Oliva MD  Dx:   Encounter Diagnosis     ICD-10-CM    1  Lymphedema I89 0    2  Sciatica of left side M54 32                   Subjective: Pt admits less PN this day, minimal c/o in arm other than lateral elbow some soreness/heaviness  Objective: See treatment diary below  Assessment: Pt has overall god vol management w/ anticipated d/c within approx 2 wks  Plan: Continue per plan of care       Precautions: R UE Lymphedema    Daily Treatment Diary     Manual   5-4 5-8 5-9 5-10 5-14 5-15 5-17 5-21 5-24   R UE MLD   x50' x50'  x45' x45'  x40' x40'   R UE bandaging   x10' x10'  x10' x10'  x10' x10'                                              Exercise Diary   5-4   5-10   5-17     LTR 3s x10 3s x10   5s x10   5 x 10     PPT 3s x15 3s x30   3s x30   3s x 30,+ TA     Abd isometric + glute set 3s x15 3s x30   3s x30   3s x 30     Abd isometric + march x10 x15   x30   X 30     Scapular retraction 3s x10    3s x30   3s x 30     UT str 10s x3            C/s rotation 5s x5            Bridges  3s x15   3s x30 w/ hip add ball sqz   3s x 30 with adductor squeeze     SKC  5s x5           Abd iso + ball add sqz  3s x30   3s x30   3sec x 30     Iso hip flexion  5s x5   5s x15   5s x 15     Seated LTR     5s x10   5s x 10     Slump str L LE     15s x5   15s x 5 bilat                                                                                                    Modalities

## 2018-05-25 ENCOUNTER — OFFICE VISIT (OUTPATIENT)
Dept: PHYSICAL THERAPY | Facility: CLINIC | Age: 63
End: 2018-05-25
Payer: MEDICARE

## 2018-05-25 DIAGNOSIS — I89.0 LYMPHEDEMA: Primary | ICD-10-CM

## 2018-05-25 DIAGNOSIS — M54.32 SCIATICA OF LEFT SIDE: ICD-10-CM

## 2018-05-25 PROCEDURE — G8978 MOBILITY CURRENT STATUS: HCPCS | Performed by: PHYSICAL THERAPIST

## 2018-05-25 PROCEDURE — G8979 MOBILITY GOAL STATUS: HCPCS | Performed by: PHYSICAL THERAPIST

## 2018-05-25 PROCEDURE — 97140 MANUAL THERAPY 1/> REGIONS: CPT | Performed by: PHYSICAL THERAPIST

## 2018-05-25 PROCEDURE — 97110 THERAPEUTIC EXERCISES: CPT | Performed by: PHYSICAL THERAPIST

## 2018-05-25 NOTE — PROGRESS NOTES
PT Re-Evaluation    Today's date: 2018  Patient name: Cristóbal Fernando  : 1955  MRN: [id number]  Referring provider: Chicho Tafoya MD  Dx:   Encounter Diagnosis     ICD-10-CM    1  Lymphedema I89 0    2  Sciatica of left side M54 32                   Subjective: Pt admits less PN this day, minimal c/o in arm other than lateral elbow some soreness/heaviness  Objective: See treatment diary below  Assessment  Impairments: abnormal gait, activity intolerance, impaired physical strength, lacks appropriate home exercise program and pain with function  Other impairment: R UE lymphedema  Functional limitations: Requires SPC for amb  Assessment details: Pt presents w/ generalized B LE weakness, poor endurance, limited LB ROM and poor core activation and posturing along w/ L UE lymphedema of chronic nature related to breast CA hx   Pt had recent hospitalization and also home care PT to address ambulation limitations requiring cont'd use of SPC  Pt also anticipates upcoming partial bowel resection related to diverticulitis  L UE lymphedema has increased since last assessment predominantly affecting L elbow and proximal thru upper arm  Skilled PT is advised utilizing full CDT protocol including MLD and compression bandaging but holding abdominal sequence secondary to recent diagnosis of abdominal aortic aneurysm and diverticulitis hx       *Assessment update 18  Pt demonstrates progressive improvement in R UE lymphedema reduction, better controlled and managed well w/ compression  Also demonstrates improved core activation and postural awareness but fatigue of LB and abdominal musculature w/ c/o limited activity melvina due to cont'd abdominal PN she attributes more directly to diverticulitis  Reduced LE sciatica sx's also realized w/ c/o knee medial jt line PN  Skilled PT is also advised to manage chronic LBP and DDD w/ associated sciatica sx's w/ emphasis on posturing, core stab, and PN mngmt  Understanding of Dx/Px/POC: good   Prognosis: good    Goals  LB Goals:  1  Increase LB ROM >25% within 2 wks -MET  2  Increase LB ROM >50% within 2-4 wks - Partially met  3  Increase LB ROM to WNL's within 4 wks - Not met  4  Increase LB strength + 1 grade within 2-4 wks - Partially met  5  Increase LB strength to WNL's within 4 wks - Not met  6  Reduce PN <1/10 w/ all activity within 4 wks - Partially met  7  Return to normal functional activity potential within 4 wks - Not met  1  Pt I in self skin care within 2-4 wks -MET  2  Pt I in self MLD within 4 wks - Partially met  3  Pt I in don/doffing LE compression garments within 4 wks - MET  4  Volume reduction of affected limb by >25% within 2-4 wks - MET  5  Volume reduction of affected limb by >50% within 4 wks - Partially met  6  Pt I in phase 2 CDT protocol including self MLD, skin maintenance, and don/doffing     garments by discharge    Plan  Patient would benefit from: skilled PT  Planned therapy interventions: manual therapy, massage, joint mobilization, strengthening, stretching, therapeutic exercise, patient education and home exercise program  Frequency: 3x week  Duration in visits: 15  Duration in weeks: 5  Treatment plan discussed with: patient and family        Subjective Evaluation    History of Present Illness  Mechanism of injury: Pt admits hospitalization for acute sciatica Feb 1-4, '18, followed by 1 spinal injection w/ PN mngmt w/ reduction of PN severity  Home care PT initiated to improved amb, strengthening, and mobility w/ use of SPC, now d/c since 4-23-18  Primary c/o at present is 5/10 "overall" Pn,  admitting c/s Pn, LBP, and PN in R UE (related to lymphedema)  L LE PN also cited related to sciatica sx's  Pt utilizing lidocaine patches to address persistent c/s Pn   Pt reports anticipated surgical intervention to manage diverticulitis in the upcoming future   Primary concerns are R UE lymphedema management, L LE sciatica, LB PN and generalized weakness, need of SPC for amb due to poor endurance  *Pt admits no longer needing SPC for amb, improved balance and overall strength but limited endurance noted along w/ difficulty maintaining posturing prolonged, also abdominal PN that persists  Pt does feel lymphedema continues to reduce, better managed w/ tx and bandaging  To consider new compression garments within next couple wks  Quality of life: poor    Pain  Current pain rating: 3  At best pain rating: 3  At worst pain ratin  Location: LB, C/S, L LE, R UE  Quality: dull ache, discomfort, sharp, squeezing, tight, radiating and needle-like  Aggravating factors: walking, standing and nothing  Progression: improved      Diagnostic Tests  MRI studies: abnormal  Treatments  Previous treatment: home therapy    Patient Goals  Patient goals for therapy: increased strength, independence with ADLs/IADLs, return to sport/leisure activities, decreased pain and decreased edema  Patient goal: Improve endurance     Palpation     Additional Palpation Details  Mild ttp B iliac crest into distal LB paraspinals w/ mild restriction B PSIS region  Active Range of Motion     Additional Active Range of Motion Details  LB flexion WNL  LB extension limited 5%  LB SB limited 10% L and R    Muscle Activation     Additional Muscle Activation Details  Fair core utilization w/ limited LB extensor and abdominal strength now 4-/5  General Comments     UE comments  Physical assessment:  Palpation: mild heaviness to R upper arm  Skin Mobility: Mild restriction and fibrosis R upper arm  Skin color: WNL, minimal redness  Pitting: Minimal  Wound presence: none  Wound size: n/a  Wound color: n/a  Stemmer's Sign: +  Gait assessment: WNL w/ SPC  Transfer status:  WNL  Ability to don/doff clothing/garments: I w/ mild difficulty        Assessment: Pt has overall god vol management w/ anticipated d/c within approx 2 wks  Plan: Continue per plan of care  Precautions: R UE Lymphedema    Daily Treatment Diary     Manual  5-25 5-17 5-21 5-24   R UE MLD         x40' x40'   R UE bandaging x10' re bandage        x10' x10'                                              Exercise Diary  5-25 5-17     LTR 5s x10       5 x 10     PPT 3s x30       3s x 30,+ TA     Abd isometric + glute set 3s x30       3s x 30     Abd isometric + march x30       X 30     Scapular retraction 3s x30       3s x 30     UT str             C/s rotation             Bridges 3s x30 w/ add ball sqz       3s x 30 with adductor squeeze     SKC             Abd iso + ball add sqz 3s x30       3sec x 30     Iso hip flexion        5s x 15     Seated LTR 5s x10       5s x 10     Slump str L LE 10s x5 L &R       15s x 5 bilat                                                                                                    Modalities

## 2018-05-29 ENCOUNTER — OFFICE VISIT (OUTPATIENT)
Dept: PHYSICAL THERAPY | Facility: CLINIC | Age: 63
End: 2018-05-29
Payer: MEDICARE

## 2018-05-29 DIAGNOSIS — M54.32 SCIATICA OF LEFT SIDE: ICD-10-CM

## 2018-05-29 DIAGNOSIS — I89.0 LYMPHEDEMA: Primary | ICD-10-CM

## 2018-05-29 PROCEDURE — 97140 MANUAL THERAPY 1/> REGIONS: CPT | Performed by: PHYSICAL THERAPIST

## 2018-05-29 NOTE — PROGRESS NOTES
Daily Note     Today's date: 2018  Patient name: James Goss  : 1955  MRN: [id number]  Referring provider: Iain Pavon MD  Dx:   Encounter Diagnosis     ICD-10-CM    1  Lymphedema I89 0    2  Sciatica of left side M54 32                   Subjective: Pt reports arm doing pretty well "I think pretty good" states pt  Objective: See treatment diary below      Assessment: Tolerated treatment well  Patient would benefit from continued PT  Mild restriction noted medial upper arm to elbow, reduced w/ MLD  Plan: Continue per plan of care       Precautions: R UE Lymphedema    Daily Treatment Diary     Manual     R UE MLD  x40'       x40' x40'   R UE bandaging x10' re bandage x10'       x10' x10'                                              Exercise Diary       LTR 5s x10       5 x 10     PPT 3s x30       3s x 30,+ TA     Abd isometric + glute set 3s x30       3s x 30     Abd isometric + march x30       X 30     Scapular retraction 3s x30       3s x 30     UT str             C/s rotation             Bridges 3s x30 w/ add ball sqz       3s x 30 with adductor squeeze     SKC             Abd iso + ball add sqz 3s x30       3sec x 30     Iso hip flexion        5s x 15     Seated LTR 5s x10       5s x 10     Slump str L LE 10s x5 L &R       15s x 5 bilat                                                                                                    Modalities

## 2018-05-30 ENCOUNTER — OFFICE VISIT (OUTPATIENT)
Dept: PHYSICAL THERAPY | Facility: CLINIC | Age: 63
End: 2018-05-30
Payer: MEDICARE

## 2018-05-30 DIAGNOSIS — I89.0 LYMPHEDEMA: Primary | ICD-10-CM

## 2018-05-30 PROCEDURE — 97140 MANUAL THERAPY 1/> REGIONS: CPT | Performed by: PHYSICAL THERAPIST

## 2018-05-30 NOTE — PROGRESS NOTES
Daily Note     Today's date: 2018  Patient name: James Goss  : 1955  MRN: [id number]  Referring provider: Iain Pavon MD  Dx:   Encounter Diagnosis     ICD-10-CM    1  Lymphedema I89 0                   Subjective: Pt reports feeling tired this AM, L shldr area painful  Objective: See treatment diary below      Assessment: Tolerated treatment well  Patient would benefit from continued PT  Mild ttp L anterior shldr this day also R posterior shldr         Plan: Continue per plan of care       Precautions: R UE Lymphedema    Daily Treatment Diary     Manual     R UE MLD  x40' x40'      x40' x40'   R UE bandaging x10' re bandage x10' x10'      x10' x10'                                              Exercise Diary       LTR 5s x10       5 x 10     PPT 3s x30       3s x 30,+ TA     Abd isometric + glute set 3s x30       3s x 30     Abd isometric + march x30       X 30     Scapular retraction 3s x30       3s x 30     UT str             C/s rotation             Bridges 3s x30 w/ add ball sqz       3s x 30 with adductor squeeze     SKC             Abd iso + ball add sqz 3s x30       3sec x 30     Iso hip flexion        5s x 15     Seated LTR 5s x10       5s x 10     Slump str L LE 10s x5 L &R       15s x 5 bilat                                                                                                    Modalities

## 2018-06-04 ENCOUNTER — OFFICE VISIT (OUTPATIENT)
Dept: PHYSICAL THERAPY | Facility: CLINIC | Age: 63
End: 2018-06-04
Payer: MEDICARE

## 2018-06-04 DIAGNOSIS — I89.0 LYMPHEDEMA: Primary | ICD-10-CM

## 2018-06-04 DIAGNOSIS — M54.32 SCIATICA OF LEFT SIDE: ICD-10-CM

## 2018-06-04 PROCEDURE — 97150 GROUP THERAPEUTIC PROCEDURES: CPT

## 2018-06-04 PROCEDURE — 97110 THERAPEUTIC EXERCISES: CPT

## 2018-06-04 NOTE — PROGRESS NOTES
Daily Note     Today's date: 2018  Patient name: Adrianna Griffin  : 1955  MRN: [id number]  Referring provider: Corina Mccord MD  Dx:   Encounter Diagnosis     ICD-10-CM    1  Lymphedema I89 0    2  Sciatica of left side M54 32                   Subjective: Patient c/o 2-3/10 left low back and also reports having diverticulitis problems,  as well as a UTI infection and started an antibiotic of Ciprofloxacin on Friday  Additionally, c/o considerable fatigue this a m  Objective:    Manual  24   R UE MLD  x40' x40'      x40' x40'   R UE bandaging x10' re bandage x10' x10'      x10' x10'                                              Exercise Diary       LTR 5s x10 5sx10      5 x 10     PPT 3s x30 3sx30      3s x 30,+ TA     Abd isometric + glute set 3s x30 3sx30      3s x 30     Abd isometric + march x30 x30      X 30     Scapular retraction 3s x30 3x30      3s x 30     UT str             C/s rotation             Bridges 3s x30 w/ add ball sqz 3sx30  w/addball      3s x 30 with adductor squeeze     SKC  5"x10           Abd iso + ball add sqz 3s x30 3sx30      3sec x 30     Iso hip flexion  5sx10      5s x 15     Seated LTR 5s x10 5sx10      5s x 10     Slump str L LE 10s x5 L &R 10sx5  L&R      15s x 5 bilat                                                                                                    Modalities                                                           Assessment: Tolerated treatment fairly well, but did struggle a bit with completing her exercises due to her diverticulitis and abdominal discomfort  Patient would benefit from continued therapy  Plan: Continue therapy as tolerated per plan of care

## 2018-06-05 ENCOUNTER — OFFICE VISIT (OUTPATIENT)
Dept: PHYSICAL THERAPY | Facility: CLINIC | Age: 63
End: 2018-06-05
Payer: MEDICARE

## 2018-06-05 DIAGNOSIS — I89.0 LYMPHEDEMA: Primary | ICD-10-CM

## 2018-06-05 PROCEDURE — 97140 MANUAL THERAPY 1/> REGIONS: CPT | Performed by: PHYSICAL THERAPIST

## 2018-06-05 NOTE — PROGRESS NOTES
Daily Note     Today's date: 2018  Patient name: Toni Albright  : 1955  MRN: [id number]  Referring provider: Isabella Gonzalez MD  Dx:   Encounter Diagnosis     ICD-10-CM    1  Lymphedema I89 0                   Subjective: Pt reports her arm feels a bit "jammed up" near axilla and along anterior delt, biceps region of upper arm  Pt admits antibiotic use to address urinary tract infection diagnosed OTW  Objective: See treatment diary below      Assessment: Tolerated treatment well  Patient would benefit from continued PT  Mod heaviness noted along anterior upper arm this day, good response to MLD  Plan: Continue per plan of care       Precautions: R UE Lymphedema    Daily Treatment Diary     Manual  30 6-    5 5 524   R UE MLD  x40' x40' x40'     x40' x40'   R UE bandaging x10' re bandage x10' x10' x10'     x10' x10'                                              Exercise Diary         517     LTR 5s x10       5 x 10     PPT 3s x30       3s x 30,+ TA     Abd isometric + glute set 3s x30       3s x 30     Abd isometric + march x30       X 30     Scapular retraction 3s x30       3s x 30     UT str             C/s rotation             Bridges 3s x30 w/ add ball sqz       3s x 30 with adductor squeeze     SKC             Abd iso + ball add sqz 3s x30       3sec x 30     Iso hip flexion        5s x 15     Seated LTR 5s x10       5s x 10     Slump str L LE 10s x5 L &R       15s x 5 bilat                                                                                                    Modalities

## 2018-06-06 ENCOUNTER — APPOINTMENT (OUTPATIENT)
Dept: PHYSICAL THERAPY | Facility: CLINIC | Age: 63
End: 2018-06-06
Payer: MEDICARE

## 2018-06-07 ENCOUNTER — OFFICE VISIT (OUTPATIENT)
Dept: PHYSICAL THERAPY | Facility: CLINIC | Age: 63
End: 2018-06-07
Payer: MEDICARE

## 2018-06-07 DIAGNOSIS — I89.0 LYMPHEDEMA: Primary | ICD-10-CM

## 2018-06-07 PROCEDURE — 97140 MANUAL THERAPY 1/> REGIONS: CPT | Performed by: PHYSICAL THERAPIST

## 2018-06-07 NOTE — PROGRESS NOTES
Daily Note     Today's date: 2018  Patient name: Abdirahman Persaud  : 1955  MRN: [id number]  Referring provider: Marciano Hester MD  Dx:   Encounter Diagnosis     ICD-10-CM    1  Lymphedema I89 0                   Subjective: Pt reports "just feeling tired" but that her arm is doing better, hoping to finish up in the near future and obtain new garments  Objective: See treatment diary below      Assessment: Tolerated treatment well  Patient would benefit from continued PT  Plan to perform MLD and bandaging an addiitonal x2 wks w/ plan to fit for garments thru [address] - pt to contact  Plan: Continue per plan of care       Precautions: R UE Lymphedema    Daily Treatment Diary     Manual   6-5 6-7   5 5 524   R UE MLD  x40' x40' x40' x40'    x40' x40'   R UE bandaging x10' re bandage x10' x10' x10' x10'    x10' x10'                                              Exercise Diary       LTR 5s x10       5 x 10     PPT 3s x30       3s x 30,+ TA     Abd isometric + glute set 3s x30       3s x 30     Abd isometric + march x30       X 30     Scapular retraction 3s x30       3s x 30     UT str             C/s rotation             Bridges 3s x30 w/ add ball sqz       3s x 30 with adductor squeeze     SKC             Abd iso + ball add sqz 3s x30       3sec x 30     Iso hip flexion        5s x 15     Seated LTR 5s x10       5s x 10     Slump str L LE 10s x5 L &R       15s x 5 bilat                                                                                                    Modalities

## 2018-06-11 ENCOUNTER — OFFICE VISIT (OUTPATIENT)
Dept: PHYSICAL THERAPY | Facility: CLINIC | Age: 63
End: 2018-06-11
Payer: MEDICARE

## 2018-06-11 DIAGNOSIS — I89.0 LYMPHEDEMA: Primary | ICD-10-CM

## 2018-06-11 PROCEDURE — 97140 MANUAL THERAPY 1/> REGIONS: CPT | Performed by: PHYSICAL THERAPIST

## 2018-06-11 NOTE — PROGRESS NOTES
Daily Note     Today's date: 2018  Patient name: Ramón Donohue  : 1955  MRN: [id number]  Referring provider: Sid Mckeon MD  Dx:   Encounter Diagnosis     ICD-10-CM    1  Lymphedema I89 0                   Subjective: Pt admits a general fatigue, also presents w/ several blister like formations L UE thru forearm to elbow this day appearing to be a reaction similar to poison ivy  Advised pt to monitor  No other indicators of infection and no sx's correlating w/ R UE  Pt does admit a dermatology appt this Thursday,  f/u  Objective: See treatment diary below      Assessment: Tolerated treatment well  Patient would benefit from continued PT  Pt has appt scheduled w/ [facility] for fitment  Responding well to tx  Plan: Continue per plan of care       Precautions: R UE Lymphedema    Daily Treatment Diary     Manual   6-5 6-7 6-11       R UE MLD  x40' x40' x40' x40' x45'       R UE bandaging x10' re bandage x10' x10' x10' x10' x10'                                                  Exercise Diary              LTR 5s x10            PPT 3s x30            Abd isometric + glute set 3s x30            Abd isometric + march x30            Scapular retraction 3s x30            UT str             C/s rotation             Bridges 3s x30 w/ add ball sqz            SKC             Abd iso + ball add sqz 3s x30            Iso hip flexion             Seated LTR 5s x10            Slump str L LE 10s x5 L &R                                                                                                           Modalities

## 2018-06-12 ENCOUNTER — OFFICE VISIT (OUTPATIENT)
Dept: PHYSICAL THERAPY | Facility: CLINIC | Age: 63
End: 2018-06-12
Payer: MEDICARE

## 2018-06-12 DIAGNOSIS — I89.0 LYMPHEDEMA: Primary | ICD-10-CM

## 2018-06-12 PROCEDURE — 97140 MANUAL THERAPY 1/> REGIONS: CPT | Performed by: PHYSICAL THERAPIST

## 2018-06-12 NOTE — PROGRESS NOTES
Daily Note     Today's date: 2018  Patient name: Irish Lopez  : 1955  MRN: [id number]  Referring provider: Tamara Oconnell MD  Dx:   Encounter Diagnosis     ICD-10-CM    1  Lymphedema I89 0                   Subjective: Pt reports persistent discomfort L UE despite area of concern (red blister like spots) reducing overall  To see dermatology Thurs  Objective: See treatment diary below      Assessment: Tolerated treatment well  Patient would benefit from continued PT  Cont'd + response to tx  Sig vol reduction, pt to be measured for new garments soon  Plan: Continue per plan of care       Precautions: R UE Lymphedema    Daily Treatment Diary     Manual   530 6-5 6-7 6-11 6-12      R UE MLD  x40' x40' x40' x40' x45' x40'      R UE bandaging x10' re bandage x10' x10' x10' x10' x10' x10'                                                 Exercise Diary              LTR 5s x10            PPT 3s x30            Abd isometric + glute set 3s x30            Abd isometric + march x30            Scapular retraction 3s x30            UT str             C/s rotation             Bridges 3s x30 w/ add ball sqz            SKC             Abd iso + ball add sqz 3s x30            Iso hip flexion             Seated LTR 5s x10            Slump str L LE 10s x5 L &R                                                                                                           Modalities

## 2018-06-14 ENCOUNTER — OFFICE VISIT (OUTPATIENT)
Dept: PHYSICAL THERAPY | Facility: CLINIC | Age: 63
End: 2018-06-14
Payer: MEDICARE

## 2018-06-14 DIAGNOSIS — M54.32 SCIATICA OF LEFT SIDE: Primary | ICD-10-CM

## 2018-06-14 PROCEDURE — 97110 THERAPEUTIC EXERCISES: CPT | Performed by: PHYSICAL THERAPIST

## 2018-06-14 PROCEDURE — 97112 NEUROMUSCULAR REEDUCATION: CPT | Performed by: PHYSICAL THERAPIST

## 2018-06-14 NOTE — PROGRESS NOTES
Daily Note     Today's date: 2018  Patient name: Layne Chaudhari  : 1955  MRN: [id number]  Referring provider: Kacy Warren MD  Dx:   Encounter Diagnosis     ICD-10-CM    1  Sciatica of left side M54 32                   Subjective: Pt reports LB PN of 2-3/10 at times, worst when first getting OOB In the AM but better overall  Managing HEP approx every other day  Pt agrees to d/c care for LB this day  Cont lymphedema tx to cont  Objective: See treatment diary below      Assessment: Tolerated treatment well  Patient would benefit from continued PT to address lymphedema of R UE  But is d/c skilled PT for LB w/ revised hep on core stab and posturing  Plan: Continue per plan of care       Precautions: R UE Lymphedema    Daily Treatment Diary     Manual   6-5 6-7 6-11 612      R UE MLD  x40' x40' x40' x40' x45' x40'      R UE bandaging x10' re bandage x10' x10' x10' x10' x10' x10'                                                 Exercise Diary   614           LTR 5s x10 5s x10           PPT 3s x30 3s x30           Abd isometric + glute set 3s x30 3s x30           Abd isometric + march x30 x30           Scapular retraction 3s x30 3s x30           UT str             C/s rotation             Bridges 3s x30 w/ add ball sqz 3s x30           SKC             Abd iso + ball add sqz 3s x30 3s x30           Iso hip flexion             Seated LTR 5s x10 5s x10           Slump str L LE 10s x5 L &R            Wall posture str  10s x5                                                                                             Modalities

## 2018-06-18 ENCOUNTER — OFFICE VISIT (OUTPATIENT)
Dept: PHYSICAL THERAPY | Facility: CLINIC | Age: 63
End: 2018-06-18
Payer: MEDICARE

## 2018-06-18 DIAGNOSIS — I89.0 LYMPHEDEMA: Primary | ICD-10-CM

## 2018-06-18 PROCEDURE — 97140 MANUAL THERAPY 1/> REGIONS: CPT | Performed by: PHYSICAL THERAPIST

## 2018-06-18 NOTE — PROGRESS NOTES
Daily Note     Today's date: 2018  Patient name: Elie Martinez  : 1955  MRN: [id number]  Referring provider: Charlee Josue MD  Dx:   Encounter Diagnosis     ICD-10-CM    1  Lymphedema I89 0                   Subjective: Pt denies PN in UE stating "it feels good today" - to have [facility] measure for garments Thursday  Objective: See treatment diary below      Assessment: Tolerated treatment well  Patient would benefit from continued PT to address lymphedema of R UE remainder of wk, then anticipate d/c    Plan: Continue per plan of care       Precautions: R UE Lymphedema    Daily Treatment Diary     Manual  30 6-5 6-7 6-11 6-12 6-18     R UE MLD  x40' x40' x40' x40' x45' x40' x40'     R UE bandaging x10' re bandage x10' x10' x10' x10' x10' x10' x10'                                                Exercise Diary   614           LTR 5s x10 5s x10           PPT 3s x30 3s x30           Abd isometric + glute set 3s x30 3s x30           Abd isometric + march x30 x30           Scapular retraction 3s x30 3s x30           UT str             C/s rotation             Bridges 3s x30 w/ add ball sqz 3s x30           SKC             Abd iso + ball add sqz 3s x30 3s x30           Iso hip flexion             Seated LTR 5s x10 5s x10           Slump str L LE 10s x5 L &R            Wall posture str  10s x5                                                                                             Modalities

## 2018-06-19 ENCOUNTER — OFFICE VISIT (OUTPATIENT)
Dept: PHYSICAL THERAPY | Facility: CLINIC | Age: 63
End: 2018-06-19
Payer: MEDICARE

## 2018-06-19 DIAGNOSIS — I89.0 LYMPHEDEMA: Primary | ICD-10-CM

## 2018-06-19 PROCEDURE — 97140 MANUAL THERAPY 1/> REGIONS: CPT | Performed by: PHYSICAL THERAPIST

## 2018-06-19 NOTE — PROGRESS NOTES
Daily Note     Today's date: 2018  Patient name: Sharonda Gould  : 1955  MRN: [id number]  Referring provider: Nick Moncada MD  Dx:   Encounter Diagnosis     ICD-10-CM    1  Lymphedema I89 0                   Subjective: Pt received topical cream from dermatologist for L UE rash - believed to be a contact dermatitis  Objective: See treatment diary below      Assessment: Tolerated treatment well  Patient would benefit from continued PT Planned d/c NV, pt to be fitted via [address] for new compression garments this Thursday  Plan: Continue per plan of care       Precautions: R UE Lymphedema    Daily Treatment Diary     Manual   6-5 6-7 6-11 [phone]    R UE MLD  x40' x40' x40' x40' x45' x40' x40' x40'    R UE bandaging x10' re bandage x10' x10' x10' x10' x10' x10' x10' x10'                                               Exercise Diary  14           LTR 5s x10 5s x10           PPT 3s x30 3s x30           Abd isometric + glute set 3s x30 3s x30           Abd isometric + march x30 x30           Scapular retraction 3s x30 3s x30           UT str             C/s rotation             Bridges 3s x30 w/ add ball sqz 3s x30           SKC             Abd iso + ball add sqz 3s x30 3s x30           Iso hip flexion             Seated LTR 5s x10 5s x10           Slump str L LE 10s x5 L &R            Wall posture str  10s x5                                                                                             Modalities

## 2018-06-21 ENCOUNTER — OFFICE VISIT (OUTPATIENT)
Dept: PHYSICAL THERAPY | Facility: CLINIC | Age: 63
End: 2018-06-21
Payer: MEDICARE

## 2018-06-21 DIAGNOSIS — I89.0 LYMPHEDEMA: Primary | ICD-10-CM

## 2018-06-21 PROCEDURE — 97140 MANUAL THERAPY 1/> REGIONS: CPT | Performed by: PHYSICAL THERAPIST

## 2018-06-21 PROCEDURE — G8992 OTHER PT/OT  D/C STATUS: HCPCS | Performed by: PHYSICAL THERAPIST

## 2018-06-21 PROCEDURE — G8991 OTHER PT/OT GOAL STATUS: HCPCS | Performed by: PHYSICAL THERAPIST

## 2018-06-21 NOTE — PROGRESS NOTES
PT Discharge  Today's date: 2018  Patient name: Sam Murray  : 1955  MRN: [id number]  Referring provider: Chris Mackenzie MD  Dx:   Encounter Diagnosis     ICD-10-CM    1  Lymphedema I89 0                   Subjective: Pt reports overall better, vol reduced and feeling less restriction t/o R UE now to be fitted w/ garments thru [facility], consider [facility]      Objective: See treatment diary below  Flowsheet Rows      Most Recent Value   girth measurments   Extremity   Upper extremity   Index Finger   L Index Finger Initial Girth  5 25 cm   R Index Finger Initial Girth  5 25 cm   Palmar Crease   L Palmar Crease Initial Girth  17 5 cm   R Palmar Crease Initial Girth  17 25 cm   Wrist   L Wrist Initial Girth  14 5 cm   R Wrist Initial Girth  15 cm   W+10cm   L W+10cm Initial Girth  21 5 cm   R W+10cm Initial Girth  20 cm   W+20cm   L W+20cm Initial Girth  23 75 cm   R W+20cm Initial Girth  23 6 cm   W+30cm   L W+30cm Initial Girth  29 25 cm   R W+30cm Initial Girth  28 75 cm   Elbow Joint   L Elbow Joint Initial Girth  23 cm   R Elbow Joint Initial Girth  22 75 cm   W+40cm   L W+40cm Initial Girth  31 cm   R W+40cm Initial Girth  32 5 cm        Goals  LB Goals:  1  Increase LB ROM >25% within 2 wks -MET  2  Increase LB ROM >50% within 2-4 wks - MET  3  Increase LB ROM to WNL's within 4 wks - MET  4  Increase LB strength + 1 grade within 2-4 wks - MET  5  Increase LB strength to WNL's within 4 wks - Not met  6  Reduce PN <1/10 w/ all activity within 4 wks - Partially met  7  Return to normal functional activity potential within 4 wks - MET  1  Pt I in self skin care within 2-4 wks -MET  2  Pt I in self MLD within 4 wks - MET  3  Pt I in don/doffing LE compression garments within 4 wks - MET  4  Volume reduction of affected limb by >25% within 2-4 wks - MET  5  Volume reduction of affected limb by >50% within 4 wks - MET  6   Pt I in phase 2 CDT protocol including self MLD, skin maintenance, and don/doffing garments -MET    Assessment: Vol reduced to WNL's w/ mild heaviness just distal to axilla, managed well w/ compression garments and bandaging  Pt in agreement to d/c to phase 2 CDT protocol including compression garments daily combined w/ night time garment  Will f/u prn       Plan: D/c skilled PT    Precautions: R UE Lymphedema    Daily Treatment Diary     Manual  5-25 5-29 5-30 6-5 6-7 6-11 6-12 6-18 6-19 6-21   R UE MLD  x40' x40' x40' x40' x45' x40' x40' x40' x40'   R UE bandaging x10' re bandage x10' x10' x10' x10' x10' x10' x10' x10' x10'                                              Exercise Diary  5-25 6-14           LTR 5s x10 5s x10           PPT 3s x30 3s x30           Abd isometric + glute set 3s x30 3s x30           Abd isometric + march x30 x30           Scapular retraction 3s x30 3s x30           UT str             C/s rotation             Bridges 3s x30 w/ add ball sqz 3s x30           SKC             Abd iso + ball add sqz 3s x30 3s x30           Iso hip flexion             Seated LTR 5s x10 5s x10           Slump str L LE 10s x5 L &R            Wall posture str  10s x5                                                                                             Modalities

## 2018-07-10 ENCOUNTER — TELEPHONE (OUTPATIENT)
Dept: FAMILY MEDICINE CLINIC | Facility: CLINIC | Age: 63
End: 2018-07-10

## 2018-07-10 NOTE — TELEPHONE ENCOUNTER
PT CALLED IN AND WANTED TO ADVISE YOU OF HER BW RESULTS/  LABS WERE DONE ON 6/3/18 AT [facility]  RESULTS ARE ON CARE EVERYWHERE AS WELL    THANK YOU    BUN 19  CREATININE 0 [phone]

## 2018-11-26 ENCOUNTER — EVALUATION (OUTPATIENT)
Dept: PHYSICAL THERAPY | Facility: CLINIC | Age: 63
End: 2018-11-26
Payer: MEDICARE

## 2018-11-26 ENCOUNTER — TRANSCRIBE ORDERS (OUTPATIENT)
Dept: PHYSICAL THERAPY | Facility: CLINIC | Age: 63
End: 2018-11-26

## 2018-11-26 DIAGNOSIS — I89.0 CHRONIC ACQUIRED LYMPHEDEMA: Primary | ICD-10-CM

## 2018-11-26 DIAGNOSIS — I89.0 LYMPHEDEMA: Primary | ICD-10-CM

## 2018-11-26 PROCEDURE — G8984 CARRY CURRENT STATUS: HCPCS | Performed by: PHYSICAL THERAPIST

## 2018-11-26 PROCEDURE — 97162 PT EVAL MOD COMPLEX 30 MIN: CPT | Performed by: PHYSICAL THERAPIST

## 2018-11-26 PROCEDURE — G8985 CARRY GOAL STATUS: HCPCS | Performed by: PHYSICAL THERAPIST

## 2018-11-26 NOTE — PROGRESS NOTES
PT Evaluation     Today's date: 2018  Patient name: Thomas Tinsley  : 1955  MRN: [id number]  Referring provider: Cornelio Mendoza  Dx:   Encounter Diagnosis     ICD-10-CM    1  Lymphedema I89 0                   Assessment  Assessment details: Pt presents w/  R UE lymphedema of chronic nature related to breast CA hx  Pt reports partial bowel resection related to diverticulitis 18 which resulted in some down time and further exacerbation of YE edema  R UE lymphedema has increased since last assessment predominantly affecting R elbow and proximal thru upper arm  Skilled PT is advised utilizing full CDT protocol including MLD and compression bandaging but holding abdominal sequence secondary to recent diagnosis of abdominal aortic aneurysm and diverticulitis hx  Impairments: abnormal gait, activity intolerance, impaired physical strength, lacks appropriate home exercise program and pain with function  Other impairment: R UE lymphedema  Understanding of Dx/Px/POC: good   Prognosis: good    Goals  ST  Pt I in self skin care within 2-4 wks  2  Volume reduction of affected limb by >25% within 2-4 wks  3  Reduce PN <3/10 w/ all activity within 2-4 wks  LT  Reduce PN <1/10 w/ all functional activity within 4-6 wks  2  Pt I in self MLD within 4-6 wks  3  Pt I in don/doffing LE compression garments within 4 wks  4  Volume reduction of affected limb by >50% within 4 wks  5   Pt I in phase 2 CDT protocol including self MLD, skin maintenance, and don/doffing garments by discharge    Plan  Patient would benefit from: skilled PT  Planned therapy interventions: manual therapy, massage, joint mobilization, strengthening, stretching, therapeutic exercise, patient education and home exercise program  Frequency: 3x week  Duration in visits: 15  Duration in weeks: 5  Treatment plan discussed with: patient and family        Subjective Evaluation    History of Present Illness  Mechanism of injury: Pt reports Increased edema of R UE feeling a heaviness and recent aggravation of R wrist/hand PN w/ 18 X-rays + for mild OA  Surgical intervention to manage diverticulitis including bowel resection performed 18  Primary concerns are R UE lymphedema management  Pt does admit feeling frustrated by lack of ability to perform IADL's including difficulty w/ household chores such as laundry, making the bed, but had just returned to driving  Pt admits poor energy level since recovering from abdominal surgery  Quality of life: poor    Pain  Current pain ratin  At best pain rating: 3  At worst pain ratin  Location: R UE (hand/wrist)  Quality: dull ache, discomfort, sharp, squeezing, tight, radiating and needle-like  Aggravating factors: walking, standing and nothing  Progression: improved      Diagnostic Tests  X-ray: abnormal  Treatments  Previous treatment: physical therapy  Patient Goals  Patient goals for therapy: independence with ADLs/IADLs, return to sport/leisure activities, decreased pain and decreased edema  Patient goal: Improve endurance      Flowsheet Rows      Most Recent Value   girth measurments   Extremity   Upper extremity   Index Finger   R Index Finger Initial Girth  5 6 cm   Palmar Crease   R Palmar Crease Initial Girth  17 5 cm   Wrist   R Wrist Initial Girth  15 25 cm   W+10cm   R W+10cm Initial Girth  22 cm   W+20cm   R W+20cm Initial Girth  24 25 cm   W+30cm   R W+30cm Initial Girth  30 cm   Elbow Joint   R Elbow Joint Initial Girth  23 25 cm   W+40cm   R W+40cm Initial Girth  34 cm            Objective     Palpation     Additional Palpation Details  Moderate ttp B iliac crest into distal LB paraspinals w/ mod restriction B PSIS region       Active Range of Motion     Additional Active Range of Motion Details  LB flexion WNL  LB extension limited 15%  LB SB limited 25% L and R    Muscle Activation     Additional Muscle Activation Details  Poor core utilization w/ limited LB extensor and abdominal strength 3+/5      General Comments     Ankle/Foot Comments   Physical assessment:  Palpation: mild heaviness to R upper arm  Skin Mobility: Mild restriction and fibrosis R upper arm  Skin color: WNL, minimal redness  Pitting: Minimal  Wound presence: none  Wound size: n/a  Wound color: n/a  Stemmer's Sign: +  Gait assessment: WNL w/ SPC  Transfer status:  WNL  Ability to don/doff clothing/garments: I w/ mild difficulty    Flowsheet Rows      Most Recent Value   girth measurments   Extremity   Upper extremity   Index Finger   R Index Finger Initial Girth  5 6 cm   Palmar Crease   R Palmar Crease Initial Girth  17 5 cm   Wrist   R Wrist Initial Girth  15 25 cm   W+10cm   R W+10cm Initial Girth  22 cm   W+20cm   R W+20cm Initial Girth  24 25 cm   W+30cm   R W+30cm Initial Girth  30 cm   Elbow Joint   R Elbow Joint Initial Girth  23 25 cm   W+40cm   R W+40cm Initial Girth  34 cm            Flowsheet Rows      Most Recent Value   PT/OT G-Codes   Current Score  40   Projected Score  50   Assessment Type  Evaluation   G code set  Carrying, Moving & Handling Objects   Carrying, Moving and Handling Objects Current Status ()  CL   Carrying, Moving and Handling Objects Goal Status ()  CK          Precautions: R UE lymphedema    Daily Treatment Diary     Manual  11-26            MLD R UE NV            Compression bandaging NV                                                       Exercise Diary                                                                                                                                                                                                                                                                                      Modalities

## 2018-11-26 NOTE — LETTER
2018    Zeinab Ragsdale PA-C  [address] [address]    Patient: Erin Bateman   YOB: 1955   Date of Visit: 2018     Encounter Diagnosis     ICD-10-CM    1  Lymphedema I89 0        Dear Dr Chaudhary Nations:    Please review the attached Plan of Care from [facility] recent visit  Please verify that you agree therapy should continue by signing the attached document and sending it back to our office  If you have any questions or concerns, please don't hesitate to call  Sincerely,    Arash Hernandez, PT      Referring Provider:      I certify that I have read the below Plan of Care and certify the need for these services furnished under this plan of treatment while under my care  Zeinab Ragsdale PA-C  [address]  [address]  [place]  [address]  VIA Facsimile: [phone]          PT Evaluation     Today's date: 2018  Patient name: Erin Bateman  : 1955  MRN: [id number]  Referring provider: Ventura Ponce  Dx:   Encounter Diagnosis     ICD-10-CM    1  Lymphedema I89 0                   Assessment  Assessment details: Pt presents w/  R UE lymphedema of chronic nature related to breast CA hx  Pt reports partial bowel resection related to diverticulitis 18 which resulted in some down time and further exacerbation of YE edema  R UE lymphedema has increased since last assessment predominantly affecting R elbow and proximal thru upper arm  Skilled PT is advised utilizing full CDT protocol including MLD and compression bandaging but holding abdominal sequence secondary to recent diagnosis of abdominal aortic aneurysm and diverticulitis hx  Impairments: abnormal gait, activity intolerance, impaired physical strength, lacks appropriate home exercise program and pain with function  Other impairment: R UE lymphedema  Understanding of Dx/Px/POC: good   Prognosis: good    Goals  ST  Pt I in self skin care within 2-4 wks  2  Volume reduction of affected limb by >25% within 2-4 wks  3  Reduce PN <3/10 w/ all activity within 2-4 wks  LT  Reduce PN <1/10 w/ all functional activity within 4-6 wks  2  Pt I in self MLD within 4-6 wks  3  Pt I in don/doffing LE compression garments within 4 wks  4  Volume reduction of affected limb by >50% within 4 wks  5  Pt I in phase 2 CDT protocol including self MLD, skin maintenance, and don/doffing garments by discharge    Plan  Patient would benefit from: skilled PT  Planned therapy interventions: manual therapy, massage, joint mobilization, strengthening, stretching, therapeutic exercise, patient education and home exercise program  Frequency: 3x week  Duration in visits: 15  Duration in weeks: 5  Treatment plan discussed with: patient and family        Subjective Evaluation    History of Present Illness  Mechanism of injury: Pt reports Increased edema of R UE feeling a heaviness and recent aggravation of R wrist/hand PN w/ 18 X-rays + for mild OA  Surgical intervention to manage diverticulitis including bowel resection performed 18  Primary concerns are R UE lymphedema management  Pt does admit feeling frustrated by lack of ability to perform IADL's including difficulty w/ household chores such as laundry, making the bed, but had just returned to driving  Pt admits poor energy level since recovering from abdominal surgery      Quality of life: poor    Pain  Current pain ratin  At best pain rating: 3  At worst pain ratin  Location: R UE (hand/wrist)  Quality: dull ache, discomfort, sharp, squeezing, tight, radiating and needle-like  Aggravating factors: walking, standing and nothing  Progression: improved      Diagnostic Tests  X-ray: abnormal  Treatments  Previous treatment: physical therapy  Patient Goals  Patient goals for therapy: independence with ADLs/IADLs, return to sport/leisure activities, decreased pain and decreased edema  Patient goal: Improve endurance      Flowsheet Rows      Most Recent Value   girth measurments   Extremity   Upper extremity   Index Finger   R Index Finger Initial Girth  5 6 cm   Palmar Crease   R Palmar Crease Initial Girth  17 5 cm   Wrist   R Wrist Initial Girth  15 25 cm   W+10cm   R W+10cm Initial Girth  22 cm   W+20cm   R W+20cm Initial Girth  24 25 cm   W+30cm   R W+30cm Initial Girth  30 cm   Elbow Joint   R Elbow Joint Initial Girth  23 25 cm   W+40cm   R W+40cm Initial Girth  34 cm            Objective     Palpation     Additional Palpation Details  Moderate ttp B iliac crest into distal LB paraspinals w/ mod restriction B PSIS region  Active Range of Motion     Additional Active Range of Motion Details  LB flexion WNL  LB extension limited 15%  LB SB limited 25% L and R    Muscle Activation     Additional Muscle Activation Details  Poor core utilization w/ limited LB extensor and abdominal strength 3+/5      General Comments     Ankle/Foot Comments   Physical assessment:  Palpation: mild heaviness to R upper arm  Skin Mobility: Mild restriction and fibrosis R upper arm  Skin color: WNL, minimal redness  Pitting: Minimal  Wound presence: none  Wound size: n/a  Wound color: n/a  Stemmer's Sign: +  Gait assessment: WNL w/ SPC  Transfer status:  WNL  Ability to don/doff clothing/garments: I w/ mild difficulty    Flowsheet Rows      Most Recent Value   girth measurments   Extremity   Upper extremity   Index Finger   R Index Finger Initial Girth  5 6 cm   Palmar Crease   R Palmar Crease Initial Girth  17 5 cm   Wrist   R Wrist Initial Girth  15 25 cm   W+10cm   R W+10cm Initial Girth  22 cm   W+20cm   R W+20cm Initial Girth  24 25 cm   W+30cm   R W+30cm Initial Girth  30 cm   Elbow Joint   R Elbow Joint Initial Girth  23 25 cm   W+40cm   R W+40cm Initial Girth  34 cm            Flowsheet Rows      Most Recent Value   PT/OT G-Codes   Current Score  40   Projected Score  50   Assessment Type Evaluation   G code set  Carrying, Moving & Handling Objects   Carrying, Moving and Handling Objects Current Status ()  CL   Carrying, Moving and Handling Objects Goal Status ()  CK          Precautions: R UE lymphedema    Daily Treatment Diary     Manual  11-26            MLD R UE NV            Compression bandaging NV                                                       Exercise Diary                                                                                                                                                                                                                                                                                      Modalities

## 2018-11-28 ENCOUNTER — OFFICE VISIT (OUTPATIENT)
Dept: PHYSICAL THERAPY | Facility: CLINIC | Age: 63
End: 2018-11-28
Payer: MEDICARE

## 2018-11-28 DIAGNOSIS — I89.0 LYMPHEDEMA: Primary | ICD-10-CM

## 2018-11-28 PROCEDURE — 97140 MANUAL THERAPY 1/> REGIONS: CPT | Performed by: PHYSICAL THERAPIST

## 2018-11-28 NOTE — PROGRESS NOTES
Daily Note     Today's date: 2018  Patient name: Livia Sabillon  : 1955  MRN: [id number]  Referring provider: Lidia Parsons  Dx:   Encounter Diagnosis     ICD-10-CM    1  Lymphedema I89 0                   Subjective: Presents for tx this day w/ bandaging supplies  Objective: See treatment diary below      Assessment: Tolerated treatment well  Patient would benefit from continued PT  Good response to MLD this day, will assess NV  Informed pt to maintain bandaged arm thru tomorrow AM then switch to night time garment  Plan: Continue per plan of care  Precautions: CA hx, ABDOMINAL PRECAUTIONS!!     Daily Treatment Diary     Manual              MLD R UE x50'            Compression bandaging R UE x10'                                                       Exercise Diary                                                                                                                                                                                                                                                                                      Modalities

## 2018-12-03 ENCOUNTER — OFFICE VISIT (OUTPATIENT)
Dept: PHYSICAL THERAPY | Facility: CLINIC | Age: 63
End: 2018-12-03
Payer: MEDICARE

## 2018-12-03 DIAGNOSIS — I89.0 LYMPHEDEMA: Primary | ICD-10-CM

## 2018-12-03 PROCEDURE — 97140 MANUAL THERAPY 1/> REGIONS: CPT | Performed by: PHYSICAL THERAPIST

## 2018-12-03 NOTE — PROGRESS NOTES
Daily Note     Today's date: 12/3/2018  Patient name: Pavan Palumbo  : 1955  MRN: [id number]  Referring provider: Truman Gama MD  Dx:   Encounter Diagnosis     ICD-10-CM    1  Lymphedema I89 0                   Subjective: Pt reports good response to initial tx last session and maintaining bandages  Objective: See treatment diary below      Assessment: Tolerated treatment well  Patient would benefit from continued PT  Pt responds well to MLD this day, mild heaviness persists along elbow to upper arm  Plan: Continue per plan of care  Precautions: CA hx, ABDOMINAL PRECAUTIONS!!     Daily Treatment Diary     Manual   12-3           MLD R UE x50' x50'           Compression bandaging R UE x10' x10'                                                      Exercise Diary                                                                                                                                                                                                                                                                                      Modalities

## 2018-12-06 ENCOUNTER — OFFICE VISIT (OUTPATIENT)
Dept: PHYSICAL THERAPY | Facility: CLINIC | Age: 63
End: 2018-12-06
Payer: MEDICARE

## 2018-12-06 DIAGNOSIS — I89.0 LYMPHEDEMA: Primary | ICD-10-CM

## 2018-12-06 PROCEDURE — 97140 MANUAL THERAPY 1/> REGIONS: CPT | Performed by: PHYSICAL THERAPIST

## 2018-12-06 NOTE — PROGRESS NOTES
Daily Note     Today's date: 2018  Patient name: Thais Busch  : 1955  MRN: [id number]  Referring provider: Mis Kat MD  Dx:   Encounter Diagnosis     ICD-10-CM    1  Lymphedema I89 0                   Subjective: Pt admits some irritation w/ self bandaging at night, removing some layers in middle of night  Admits "fatigue is at a 7 today, I'm dragging "    Objective: See treatment diary below      Assessment: Tolerated treatment well  Patient would benefit from continued PT  Mod heaviness within triceps this day but reduced thru forearm  Plan: Continue per plan of care  Precautions: CA hx, ABDOMINAL PRECAUTIONS!!     Daily Treatment Diary     Manual  11-28 12-3 12-6          MLD R UE x50' x50' x50'          Compression bandaging R UE x10' x10' x10'                                                      Exercise Diary                                                                                                                                                                                                                                                                                      Modalities

## 2018-12-10 ENCOUNTER — OFFICE VISIT (OUTPATIENT)
Dept: PHYSICAL THERAPY | Facility: CLINIC | Age: 63
End: 2018-12-10
Payer: MEDICARE

## 2018-12-10 DIAGNOSIS — I89.0 LYMPHEDEMA: Primary | ICD-10-CM

## 2018-12-10 PROCEDURE — 97140 MANUAL THERAPY 1/> REGIONS: CPT | Performed by: PHYSICAL THERAPIST

## 2018-12-10 NOTE — PROGRESS NOTES
Daily Note     Today's date: 12/10/2018  Patient name: Livia Sabillon  : 1955  MRN: [id number]  Referring provider: Rosa M Garcia MD  Dx:   Encounter Diagnosis     ICD-10-CM    1  Lymphedema I89 0                   Subjective: Pt reports feeling pretty good PN wise 6/10, "feels stuck in forearm region" in regards to lymphedema of R UE  Objective: See treatment diary below      Assessment: Tolerated treatment well  Patient would benefit from continued PT  Mild heaviness noted R forearm region, just distal to elbow  Plan: Continue per plan of care  Precautions: CA hx, ABDOMINAL PRECAUTIONS!!     Daily Treatment Diary     Manual   12-3 12-6 12-10         MLD R UE x50' x50' x50' x45'         Compression bandaging R UE x10' x10' x10'  x10'                                                    Exercise Diary                                                                                                                                                                                                                                                                                      Modalities

## 2018-12-12 ENCOUNTER — OFFICE VISIT (OUTPATIENT)
Dept: PHYSICAL THERAPY | Facility: CLINIC | Age: 63
End: 2018-12-12
Payer: MEDICARE

## 2018-12-12 DIAGNOSIS — I89.0 LYMPHEDEMA: Primary | ICD-10-CM

## 2018-12-12 PROCEDURE — 97140 MANUAL THERAPY 1/> REGIONS: CPT | Performed by: PHYSICAL THERAPIST

## 2018-12-12 NOTE — PROGRESS NOTES
Daily Note     Today's date: 2018  Patient name: Michelle Siddiqi  : 1955  MRN: [id number]  Referring provider: Buffy Plascencia MD  Dx:   Encounter Diagnosis     ICD-10-CM    1  Lymphedema I89 0                   Subjective: Pt reports she felt the bandaging was a little tight last session, having to remove bandages after a bit of time  Objective: See treatment diary below      Assessment: Tolerated treatment well  Patient would benefit from continued PT  Improved reduction t/o arm, assessed grossly  Plan: Continue per plan of care  Precautions: CA hx, ABDOMINAL PRECAUTIONS!!     Daily Treatment Diary     Manual   12-3 12-6 12-10 12-12        MLD R UE x50' x50' x50' x45' x45'        Compression bandaging R UE x10' x10' x10'  x10' x10'                                                   Exercise Diary                                                                                                                                                                                                                                                                                      Modalities

## 2018-12-14 ENCOUNTER — OFFICE VISIT (OUTPATIENT)
Dept: PHYSICAL THERAPY | Facility: CLINIC | Age: 63
End: 2018-12-14
Payer: MEDICARE

## 2018-12-14 DIAGNOSIS — I89.0 LYMPHEDEMA: Primary | ICD-10-CM

## 2018-12-14 PROCEDURE — 97140 MANUAL THERAPY 1/> REGIONS: CPT | Performed by: PHYSICAL THERAPIST

## 2018-12-14 NOTE — PROGRESS NOTES
Daily Note     Today's date: 2018  Patient name: Thu Gillis  : 1955  MRN: [id number]  Referring provider: Dane Amador MD  Dx:   Encounter Diagnosis     ICD-10-CM    1  Lymphedema I89 0                   Subjective: Pt admits doing more around the house yesterday and now PN in her hand along dorsum this AM and during day yesterday  Objective: See treatment diary below      Assessment: Tolerated treatment well  Patient would benefit from continued PT  Pt responds well to MLD cont'd to take care to level of compression w/ bandaging secondary to pt's hand PN c/o  Plan: Continue per plan of care  Precautions: CA hx, ABDOMINAL PRECAUTIONS!!     Daily Treatment Diary     Manual   12-3 12-6 12-10  12-14       MLD R UE x50' x50' x50' x45' x45' x45'       Compression bandaging R UE x10' x10' x10'  x10' x10' x10'                                                  Exercise Diary                                                                                                                                                                                                                                                                                      Modalities

## 2018-12-17 ENCOUNTER — OFFICE VISIT (OUTPATIENT)
Dept: PHYSICAL THERAPY | Facility: CLINIC | Age: 63
End: 2018-12-17
Payer: MEDICARE

## 2018-12-17 DIAGNOSIS — I89.0 LYMPHEDEMA: Primary | ICD-10-CM

## 2018-12-17 PROCEDURE — 97140 MANUAL THERAPY 1/> REGIONS: CPT | Performed by: PHYSICAL THERAPIST

## 2018-12-17 NOTE — PROGRESS NOTES
Daily Note     Today's date: 2018  Patient name: Marc Boudreaux  : 1955  MRN: [id number]  Referring provider: Primo Bowen MD  Dx:   Encounter Diagnosis     ICD-10-CM    1  Lymphedema I89 0                   Subjective: Pt reports arm doing pretty good but feels "stuck" in elbow region, primary c/o is "bone PN in her head today" which she feels is attributed to fibromyalgia flare  Medicated to address  To see PN mngmt this afternoon  Objective: See treatment diary below      Assessment: Tolerated treatment well  Patient would benefit from continued PT  Mild heaviness R elbow  Plan: Continue per plan of care  Precautions: CA hx, ABDOMINAL PRECAUTIONS!!     Daily Treatment Diary     Manual   12-3 12-6 12-10 12-12 12-14 12-17      MLD R UE x50' x50' x50' x45' x45' x45' x42'      Compression bandaging R UE x10' x10' x10'  x10' x10' x10' x8'                                                 Exercise Diary                                                                                                                                                                                                                                                                                      Modalities

## 2018-12-19 ENCOUNTER — OFFICE VISIT (OUTPATIENT)
Dept: PHYSICAL THERAPY | Facility: CLINIC | Age: 63
End: 2018-12-19
Payer: MEDICARE

## 2018-12-19 DIAGNOSIS — I89.0 LYMPHEDEMA: Primary | ICD-10-CM

## 2018-12-19 PROCEDURE — 97140 MANUAL THERAPY 1/> REGIONS: CPT | Performed by: PHYSICAL THERAPIST

## 2018-12-19 NOTE — PROGRESS NOTES
Daily Note     Today's date: 2018  Patient name: Chelsea Aldana  : 1955  MRN: [id number]  Referring provider: Micah Taylor MD  Dx:   Encounter Diagnosis     ICD-10-CM    1  Lymphedema I89 0                   Subjective: Pt reports admitting some redness in arm after removing her bandage this AM      Objective: See treatment diary below      Assessment: Tolerated treatment well  Patient would benefit from continued PT  Appears to be mils abrasion from friction of bandages, reduced post manuals, but advised pt to monitor  Plan: Continue per plan of care  Precautions: CA hx, ABDOMINAL PRECAUTIONS!!     Daily Treatment Diary     Manual   12-3 12-6 12-10 12-12 12-14 12-17 12-19     MLD R UE x50' x50' x50' x45' x45' x45' x42' x45'     Compression bandaging R UE x10' x10' x10'  x10' x10' x10' x8' x10'                                                Exercise Diary                                                                                                                                                                                                                                                                                      Modalities

## 2018-12-21 ENCOUNTER — OFFICE VISIT (OUTPATIENT)
Dept: PHYSICAL THERAPY | Facility: CLINIC | Age: 63
End: 2018-12-21
Payer: MEDICARE

## 2018-12-21 DIAGNOSIS — I89.0 LYMPHEDEMA: Primary | ICD-10-CM

## 2018-12-21 PROCEDURE — 97140 MANUAL THERAPY 1/> REGIONS: CPT | Performed by: PHYSICAL THERAPIST

## 2018-12-21 PROCEDURE — G8985 CARRY GOAL STATUS: HCPCS | Performed by: PHYSICAL THERAPIST

## 2018-12-21 PROCEDURE — G8984 CARRY CURRENT STATUS: HCPCS | Performed by: PHYSICAL THERAPIST

## 2018-12-21 NOTE — PROGRESS NOTES
Daily Note     Today's date: 2018  Patient name: Joeseph Skiff  : 1955  MRN: [id number]  Referring provider: Dusty Laureano MD  Dx:   Encounter Diagnosis     ICD-10-CM    1  Lymphedema I89 0                   Subjective: Pt presents this day w/ report of redness along forearm, but feels entire arm this day is looking good  Objective: See treatment diary below      Assessment: Tolerated treatment well  Patient would benefit from continued PT  Redness along palmar aspect of forearm noted, x4 small blotchy red areas, no sig raising of skin, approx 0 5cm x0 25cm in size  Advised pt to monitor and can apply neosporin lotion as needed  No other negative indicators suggesting infection risk  Plan: Continue per plan of care  Precautions: CA hx, ABDOMINAL PRECAUTIONS!!     Daily Treatment Diary     Manual   12-3 12-6 12-10 12-12 12-14 12-17 12-19 12-21    MLD R UE x50' x50' x50' x45' x45' x45' x42' x45' x45'    Compression bandaging R UE x10' x10' x10'  x10' x10' x10' x8' x10' x10'                                               Exercise Diary                                                                                                                                                                                                                                                                                      Modalities

## 2018-12-27 ENCOUNTER — OFFICE VISIT (OUTPATIENT)
Dept: PHYSICAL THERAPY | Facility: CLINIC | Age: 63
End: 2018-12-27
Payer: MEDICARE

## 2018-12-27 DIAGNOSIS — I89.0 LYMPHEDEMA: Primary | ICD-10-CM

## 2018-12-27 PROCEDURE — 97140 MANUAL THERAPY 1/> REGIONS: CPT | Performed by: PHYSICAL THERAPIST

## 2018-12-27 NOTE — PROGRESS NOTES
Daily Note     Today's date: 2018  Patient name: Linn Josue  : 1955  MRN: [id number]  Referring provider: Carlito Oliveira MD  Dx:   Encounter Diagnosis     ICD-10-CM    1  Lymphedema I89 0                   Subjective: Pt inquires reg potential need for new garments, believe night garment maintains arm well at this point  Pt checking on ins cov  Objective: See treatment diary below      Assessment: Tolerated treatment well  Patient would benefit from continued PT  Reduced heaviness noted t/o L arm w/ no presence of redness remaining since last session  Pt admitting use of neosporin ointment initially  Plan: Continue per plan of care  Precautions: CA hx, ABDOMINAL PRECAUTIONS!!     Daily Treatment Diary     Manual   12-3 12-6 12-10 12--14 -   MLD R UE x50' x50' x50' x45' x45' x45' x42' x45' x45' x45'   Compression bandaging R UE x10' x10' x10'  x10' x10' x10' x8' x10' x10' x10'                                              Exercise Diary                                                                                                                                                                                                                                                                                      Modalities

## 2019-01-03 ENCOUNTER — OFFICE VISIT (OUTPATIENT)
Dept: PHYSICAL THERAPY | Facility: CLINIC | Age: 64
End: 2019-01-03
Payer: MEDICARE

## 2019-01-03 DIAGNOSIS — I89.0 LYMPHEDEMA: Primary | ICD-10-CM

## 2019-01-03 PROCEDURE — 97140 MANUAL THERAPY 1/> REGIONS: CPT | Performed by: PHYSICAL THERAPIST

## 2019-01-03 NOTE — PROGRESS NOTES
Daily Note     Today's date: 1/3/2019  Patient name: Hayden Preston  : 1955  MRN: [id number]  Referring provider: Jennifer Elkins MD  Dx:   Encounter Diagnosis     ICD-10-CM    1  Lymphedema I89 0                   Subjective: Pt c/o persistent abdominal PN associated w/ more recent abd surgery, also discomfort R posterior shldr      Objective: See treatment diary below      Assessment: Tolerated treatment well  Patient would benefit from continued PT  Moderate ttp noted R posterior shldr, appears to be myofascial in origin  Plan: Continue per plan of care  Precautions: CA hx, ABDOMINAL PRECAUTIONS!!     Daily Treatment Diary     Manual  1-3         12-27   MLD R UE x40'         x45'   Compression bandaging R UE x10'         x10'                                              Exercise Diary                                                                                                                                                                                                                                                                                      Modalities

## 2019-01-07 ENCOUNTER — OFFICE VISIT (OUTPATIENT)
Dept: PHYSICAL THERAPY | Facility: CLINIC | Age: 64
End: 2019-01-07
Payer: MEDICARE

## 2019-01-07 DIAGNOSIS — I89.0 LYMPHEDEMA: Primary | ICD-10-CM

## 2019-01-07 PROCEDURE — 97140 MANUAL THERAPY 1/> REGIONS: CPT | Performed by: PHYSICAL THERAPIST

## 2019-01-07 NOTE — PROGRESS NOTES
Daily Note     Today's date: 2019  Patient name: Za Sales  : 1955  MRN: [id number]  Referring provider: Rakan Negron MD  Dx:   Encounter Diagnosis     ICD-10-CM    1  Lymphedema I89 0                   Subjective: Pt expresses frustration w/ recurring abdominal PN, minimal solution from recent specialist f/u w/ surgeon  Pt admits periscapular irritation diminished since being addressed LV  Objective: See treatment diary below      Assessment: Tolerated treatment well  Patient would benefit from continued PT  Reduced myofascial restriction t/o R periscap region this day  Plan: Continue per plan of care  Precautions: CA hx, ABDOMINAL PRECAUTIONS!!     Daily Treatment Diary     Manual  1-3 1-7        12-27   MLD R UE x40' x40'        x45'   Compression bandaging R UE x10' X10'        x10'                                              Exercise Diary                                                                                                                                                                                                                                                                                      Modalities

## 2019-01-09 ENCOUNTER — OFFICE VISIT (OUTPATIENT)
Dept: PHYSICAL THERAPY | Facility: CLINIC | Age: 64
End: 2019-01-09
Payer: MEDICARE

## 2019-01-09 DIAGNOSIS — I89.0 LYMPHEDEMA: Primary | ICD-10-CM

## 2019-01-09 PROCEDURE — 97140 MANUAL THERAPY 1/> REGIONS: CPT | Performed by: PHYSICAL THERAPIST

## 2019-01-09 NOTE — PROGRESS NOTES
PT Re-Evaluation     Today's date: 2019  Patient name: Beena Perry  : 1955  MRN: [id number]  Referring provider: Kristina Hilliard MD  Dx:   Encounter Diagnosis     ICD-10-CM    1  Lymphedema I89 0                   Assessment  Assessment details: Pt presents w/  R UE lymphedema of chronic nature related to breast CA hx  Pt reports partial bowel resection related to diverticulitis 18 which resulted in some down time and further exacerbation of YE edema  R UE lymphedema has increased since last assessment predominantly affecting R elbow and proximal thru upper arm  Skilled PT is advised utilizing full CDT protocol including MLD and compression bandaging but holding abdominal sequence secondary to recent diagnosis of abdominal aortic aneurysm and diverticulitis hx       19 Update:  Pt has returned to pre tx measurements in all areas w/ exception of + 20cm from wrist and +40cm from wrist, or essentially just distal to axilla and distal to elbow  Skin integrity has returned to normal and pt is managing well w/ garments and self MLD + bandaging  Plan to d/c skilled PT w/ 6 mo f/u following NV  Impairments: abnormal gait, activity intolerance, impaired physical strength, lacks appropriate home exercise program and pain with function  Other impairment: R UE lymphedema  Understanding of Dx/Px/POC: good   Prognosis: good    Goals  ST  Pt I in self skin care within 2-4 wks  2  Volume reduction of affected limb by >25% within 2-4 wks  3  Reduce PN <3/10 w/ all activity within 2-4 wks  LT  Reduce PN <1/10 w/ all functional activity within 4-6 wks  2  Pt I in self MLD within 4-6 wks  3  Pt I in don/doffing LE compression garments within 4 wks  4  Volume reduction of affected limb by >50% within 4 wks  5   Pt I in phase 2 CDT protocol including self MLD, skin maintenance, and don/doffing garments by discharge    Plan  Patient would benefit from: skilled PT  Planned therapy interventions: manual therapy, massage, joint mobilization, strengthening, stretching, therapeutic exercise, patient education and home exercise program  Frequency: 3x week  Duration in visits: 2  Duration in weeks: 1  Plan of Care beginning date: 2019  Plan of Care expiration date: 2019  Treatment plan discussed with: patient and family        Subjective Evaluation    History of Present Illness  Mechanism of injury: Pt reports Increased edema of R UE feeling a heaviness and recent aggravation of R wrist/hand PN w/ 18 X-rays + for mild OA  Surgical intervention to manage diverticulitis including bowel resection performed 18  Primary concerns are R UE lymphedema management  Pt does admit feeling frustrated by lack of ability to perform IADL's including difficulty w/ household chores such as laundry, making the bed, but had just returned to driving  Pt admits poor energy level since recovering from abdominal surgery  19 Update:  Pt admits doing well in arm, anticipates upcoming d/c and plans to maintain utilizing current garments, self skin care, and self bandaging  Primary c/o remains persistent abdominal discomfort  Quality of life: poor    Pain  Current pain ratin  At best pain ratin  At worst pain rating: 3  Location: R UE (hand/wrist)  Quality: dull ache, discomfort, sharp, squeezing, tight, radiating and needle-like  Aggravating factors: walking, standing and nothing  Progression: improved      Diagnostic Tests  X-ray: abnormal  Treatments  Previous treatment: physical therapy  Patient Goals  Patient goals for therapy: independence with ADLs/IADLs, return to sport/leisure activities, decreased pain and decreased edema  Patient goal: Improve endurance            Objective     Palpation     Additional Palpation Details  Moderate ttp B iliac crest into distal LB paraspinals w/ mod restriction B PSIS region       Active Range of Motion     Additional Active Range of Motion Details  LB flexion WNL  LB extension limited 15%  LB SB limited 25% L and R    Muscle Activation     Additional Muscle Activation Details  Poor core utilization w/ limited LB extensor and abdominal strength 3+/5      General Comments     Ankle/Foot Comments   Physical assessment:  Palpation: mild heaviness at W+20 cm and W+40cm measurements  Skin Mobility: Minimal restriction and fibrosis R upper arm  Skin color: WNL  Pitting: Minimal  Wound presence: none  Wound size: n/a  Wound color: n/a  Stemmer's Sign: +  Gait assessment: WNL w/ SPC  Transfer status:  WNL  Ability to don/doff clothing/garments: I w/ mild difficulty      Flowsheet Rows      Most Recent Value   girth measurments   Extremity   Upper extremity   Index Finger   R Index Finger Initial Girth  5 5 cm   Palmar Crease   R Palmar Crease Initial Girth  17 25 cm   Wrist   R Wrist Initial Girth  15 cm   W+10cm   R W+10cm Initial Girth  20 25 cm   W+20cm   R W+20cm Initial Girth  24 25 cm   W+30cm   R W+30cm Initial Girth  28 75 cm   Elbow Joint   R Elbow Joint Initial Girth  23 cm   W+40cm   R W+40cm Initial Girth  33 25 cm                Precautions: R UE lymphedema    Daily Treatment Diary     Manual  11-26            MLD R UE NV            Compression bandaging NV                                                       Exercise Diary                                                                                                                                                                                                                                                                                      Modalities

## 2019-01-11 ENCOUNTER — OFFICE VISIT (OUTPATIENT)
Dept: PHYSICAL THERAPY | Facility: CLINIC | Age: 64
End: 2019-01-11
Payer: MEDICARE

## 2019-01-11 DIAGNOSIS — I89.0 LYMPHEDEMA: Primary | ICD-10-CM

## 2019-01-11 PROCEDURE — 97140 MANUAL THERAPY 1/> REGIONS: CPT | Performed by: PHYSICAL THERAPIST

## 2019-01-11 NOTE — PROGRESS NOTES
PT Discharge    Today's date: 2019  Patient name: Michelle Siddiqi  : 1955  MRN: [id number]  Referring provider: Buffy Plascencia MD  Dx:   Encounter Diagnosis     ICD-10-CM    1  Lymphedema I89 0                   Assessment  Assessment details: Pt presents w/  R UE lymphedema of chronic nature related to breast CA hx  Pt reports partial bowel resection related to diverticulitis 18 which resulted in some down time and further exacerbation of YE edema  R UE lymphedema has increased since last assessment predominantly affecting R elbow and proximal thru upper arm  Skilled PT is advised utilizing full CDT protocol including MLD and compression bandaging but holding abdominal sequence secondary to recent diagnosis of abdominal aortic aneurysm and diverticulitis hx       19 Update:  Pt has returned to pre tx measurements in all areas w/ exception of + 20cm from wrist and +40cm from wrist, or essentially just distal to axilla and distal to elbow  Skin integrity has returned to normal and pt is managing well w/ garments and self MLD + bandaging  Plan to d/c skilled PT w/ 6 mo f/u  Impairments: abnormal gait, activity intolerance, impaired physical strength, lacks appropriate home exercise program and pain with function  Other impairment: R UE lymphedema  Understanding of Dx/Px/POC: good   Prognosis: good    Goals  ST  Pt I in self skin care within 2-4 wks  2  Volume reduction of affected limb by >25% within 2-4 wks  3  Reduce PN <3/10 w/ all activity within 2-4 wks  LT  Reduce PN <1/10 w/ all functional activity within 4-6 wks  2  Pt I in self MLD within 4-6 wks  3  Pt I in don/doffing LE compression garments within 4 wks  4  Volume reduction of affected limb by >50% within 4 wks  5   Pt I in phase 2 CDT protocol including self MLD, skin maintenance, and don/doffing garments by discharge    Plan  Patient would benefit from: skilled PT  Planned therapy interventions: manual therapy, massage, joint mobilization, strengthening, stretching, therapeutic exercise, patient education and home exercise program  Frequency: 3x week  Duration in visits: 2  Duration in weeks: 1  Plan of Care beginning date: 2019  Plan of Care expiration date: 2019  Treatment plan discussed with: patient and family        Subjective Evaluation    History of Present Illness  Mechanism of injury: Pt reports Increased edema of R UE feeling a heaviness and recent aggravation of R wrist/hand PN w/ 18 X-rays + for mild OA  Surgical intervention to manage diverticulitis including bowel resection performed 18  Primary concerns are R UE lymphedema management  Pt does admit feeling frustrated by lack of ability to perform IADL's including difficulty w/ household chores such as laundry, making the bed, but had just returned to driving  Pt admits poor energy level since recovering from abdominal surgery  19 Update:  Pt admits doing well in arm, anticipates upcoming d/c and plans to maintain utilizing current garments, self skin care, and self bandaging  Primary c/o remains persistent abdominal discomfort  Quality of life: poor    Pain  Current pain ratin  At best pain ratin  At worst pain rating: 3  Location: R UE (hand/wrist)  Quality: dull ache, discomfort, sharp, squeezing, tight, radiating and needle-like  Aggravating factors: walking, standing and nothing  Progression: improved      Diagnostic Tests  X-ray: abnormal  Treatments  Previous treatment: physical therapy  Patient Goals  Patient goals for therapy: independence with ADLs/IADLs, return to sport/leisure activities, decreased pain and decreased edema  Patient goal: Improve endurance            Objective     Palpation     Additional Palpation Details  Moderate ttp B iliac crest into distal LB paraspinals w/ mod restriction B PSIS region       Active Range of Motion     Additional Active Range of Motion Details  LB flexion WNL  LB extension limited 15%  LB SB limited 25% L and R    Muscle Activation     Additional Muscle Activation Details  Poor core utilization w/ limited LB extensor and abdominal strength 3+/5      General Comments     Ankle/Foot Comments   Physical assessment:  Palpation: mild heaviness at W+20 cm and W+40cm measurements  Skin Mobility: Minimal restriction and fibrosis R upper arm  Skin color: WNL  Pitting: Minimal  Wound presence: none  Wound size: n/a  Wound color: n/a  Stemmer's Sign: +  Gait assessment: WNL w/ SPC  Transfer status:  WNL  Ability to don/doff clothing/garments: I w/ mild difficulty         Flowsheet Rows      Most Recent Value   girth measurments   Extremity   Upper extremity   Index Finger   R Index Finger Initial Girth  5 5 cm   Palmar Crease   R Palmar Crease Initial Girth  17 25 cm   Wrist   R Wrist Initial Girth  15 cm   W+10cm   R W+10cm Initial Girth  20 25 cm   W+20cm   R W+20cm Initial Girth  24 25 cm   W+30cm   R W+30cm Initial Girth  28 75 cm   Elbow Joint   R Elbow Joint Initial Girth  23 cm   W+40cm   R W+40cm Initial Girth  33 25 cm                  Precautions: R UE lymphedema    Daily Treatment Diary     Manual  1-11            MLD R UE jph            Compression bandaging [facility]                                       x50'                Exercise Diary                                                                                                                                                                                                                                                                                      Modalities

## 2019-01-30 ENCOUNTER — EVALUATION (OUTPATIENT)
Dept: PHYSICAL THERAPY | Facility: CLINIC | Age: 64
End: 2019-01-30
Payer: MEDICARE

## 2019-01-30 DIAGNOSIS — I89.0 LYMPHEDEMA: Primary | ICD-10-CM

## 2019-01-30 PROCEDURE — 97163 PT EVAL HIGH COMPLEX 45 MIN: CPT | Performed by: PHYSICAL THERAPIST

## 2019-01-30 NOTE — PROGRESS NOTES
PT Evaluation  and PT Discharge    Today's date: 2019  Patient name: Santiago Farr  : 1955  MRN: [id number]  Referring provider: Messi Johnson MD  Dx:   Encounter Diagnosis     ICD-10-CM    1  Lymphedema I89 0                   Assessment  Assessment details: Pt presents w/  R UE lymphedema of chronic nature related to breast CA hx  Pt reports partial bowel resection related to diverticulitis 18 which resulted in some down time and further exacerbation of YE edema  R UE lymphedema has increased since last assessment predominantly affecting R elbow and proximal thru upper arm  Skilled PT is advised utilizing full CDT protocol including MLD and compression bandaging but holding abdominal sequence secondary to recent diagnosis of abdominal aortic aneurysm and diverticulitis hx       19 Update:  Pt has returned to pre tx measurements in all areas w/ exception of + 20cm from wrist and +40cm from wrist, or essentially just distal to axilla and distal to elbow  Skin integrity has returned to normal and pt is managing well w/ garments and self MLD + bandaging  Plan to d/c skilled PT w/ 6 mo f/u      19 Update:  Pt measured for [employer] night time garment and [employer] custom sleeve w/ gauntlet  Forms to be scanned in and faxed to DME provider [address] per pt request    Impairments: abnormal gait, activity intolerance, impaired physical strength, lacks appropriate home exercise program and pain with function  Other impairment: R UE lymphedema  Understanding of Dx/Px/POC: good   Prognosis: good    Goals  ST  Pt I in self skin care within 2-4 wks  2  Volume reduction of affected limb by >25% within 2-4 wks  3  Reduce PN <3/10 w/ all activity within 2-4 wks  LT  Reduce PN <1/10 w/ all functional activity within 4-6 wks  2  Pt I in self MLD within 4-6 wks  3  Pt I in don/doffing LE compression garments within 4 wks  4   Volume reduction of affected limb by >50% within 4 wks  5  Pt I in phase 2 CDT protocol including self MLD, skin maintenance, and don/doffing garments by discharge    Plan  Patient would benefit from: skilled PT  Planned therapy interventions: manual therapy, massage, joint mobilization, strengthening, stretching, therapeutic exercise, patient education and home exercise program  Frequency: 3x week  Duration in visits: 2  Duration in weeks: 1  Plan of Care beginning date: 2019  Plan of Care expiration date: 2019  Treatment plan discussed with: patient and family        Subjective Evaluation    History of Present Illness  Mechanism of injury: Pt reports Increased edema of R UE feeling a heaviness and recent aggravation of R wrist/hand PN w/ 18 X-rays + for mild OA  Surgical intervention to manage diverticulitis including bowel resection performed 18  Primary concerns are R UE lymphedema management  Pt does admit feeling frustrated by lack of ability to perform IADL's including difficulty w/ household chores such as laundry, making the bed, but had just returned to driving  Pt admits poor energy level since recovering from abdominal surgery  19 Update:  Pt admits doing well in arm, anticipates upcoming d/c and plans to maintain utilizing current garments, self skin care, and self bandaging  Primary c/o remains persistent abdominal discomfort  19:  Pt seen for f/u to measure for garments      Quality of life: poor    Pain  Current pain ratin  At best pain ratin  At worst pain rating: 3  Location: R UE (hand/wrist)  Quality: dull ache, discomfort, sharp, squeezing, tight, radiating and needle-like  Aggravating factors: walking, standing and nothing  Progression: improved      Diagnostic Tests  X-ray: abnormal  Treatments  Previous treatment: physical therapy  Patient Goals  Patient goals for therapy: independence with ADLs/IADLs, return to sport/leisure activities, decreased pain and decreased edema  Patient goal: Improve endurance            Objective     Palpation     Additional Palpation Details  Moderate ttp B iliac crest into distal LB paraspinals w/ mod restriction B PSIS region  Active Range of Motion     Additional Active Range of Motion Details  LB flexion WNL  LB extension limited 15%  LB SB limited 25% L and R    Muscle Activation     Additional Muscle Activation Details  Poor core utilization w/ limited LB extensor and abdominal strength 3+/5  General Comments:       Ankle/Foot Comments   Physical assessment:  Palpation: mild heaviness at W+20 cm and W+40cm measurements  Skin Mobility: Minimal restriction and fibrosis R upper arm  Skin color: WNL  Pitting: Minimal  Wound presence: none  Wound size: n/a  Wound color: n/a  Stemmer's Sign: +  Gait assessment: WNL w/ SPC  Transfer status:  WNL  Ability to don/doff clothing/garments: I w/ mild difficulty                     Precautions: R UE lymphedema    Daily Treatment Diary     Manual  1-11 1-30           MLD R UE [facility]            Compression bandaging [facility]            R UE measurements                           x50' x50'               Exercise Diary                                                                                                                                                                                                                                                                                      Modalities

## 2019-03-14 ENCOUNTER — TRANSCRIBE ORDERS (OUTPATIENT)
Dept: PHYSICAL THERAPY | Facility: CLINIC | Age: 64
End: 2019-03-14

## 2019-03-14 ENCOUNTER — EVALUATION (OUTPATIENT)
Dept: PHYSICAL THERAPY | Facility: CLINIC | Age: 64
End: 2019-03-14
Payer: MEDICARE

## 2019-03-14 DIAGNOSIS — I89.0 CHRONIC ACQUIRED LYMPHEDEMA: Primary | ICD-10-CM

## 2019-03-14 DIAGNOSIS — I89.0 LYMPHEDEMA: Primary | ICD-10-CM

## 2019-03-14 PROCEDURE — 97163 PT EVAL HIGH COMPLEX 45 MIN: CPT | Performed by: PHYSICAL THERAPIST

## 2019-03-14 NOTE — PROGRESS NOTES
PT Evaluation  and PT Discharge    Today's date: 3/14/2019  Patient name: Pavan Palumbo  : 1955  MRN: [id number]  Referring provider: Corrina Pérez MD  Dx:   Encounter Diagnosis     ICD-10-CM    1  Lymphedema I89 0                   Assessment  Assessment details: Pt presents w/  R UE lymphedema of chronic nature related to breast CA hx  Pt had been measured 19 for [employer] night time garment and [employer] custom sleeve w/ gauntlet  Forms to be scanned in and faxed to DME provider [employer] per pt request  Now presents for re-measurement to correct [employer] night time garment w/ slight modification made to measurements to reflect recent reduction in overall volume  Correction suggested thru forearm measurements CD and CE as it appears garment manufactured utilizing error in CE measurement thru   Pt now d/c care in favor of 6 mo f/u  Impairments: abnormal gait, activity intolerance, impaired physical strength, lacks appropriate home exercise program and pain with function  Other impairment: R UE lymphedema  Understanding of Dx/Px/POC: good   Prognosis: good    Goals  ST  Pt I in self skin care within 2-4 wks  2  Volume reduction of affected limb by >25% within 2-4 wks  3  Reduce PN <3/10 w/ all activity within 2-4 wks  LT  Reduce PN <1/10 w/ all functional activity within 4-6 wks  2  Pt I in self MLD within 4-6 wks  3  Pt I in don/doffing LE compression garments within 4 wks  4  Volume reduction of affected limb by >50% within 4 wks  5   Pt I in phase 2 CDT protocol including self MLD, skin maintenance, and don/doffing garments by discharge    Plan  Patient would benefit from: skilled PT  Planned therapy interventions: manual therapy, massage, joint mobilization, strengthening, stretching, therapeutic exercise, patient education and home exercise program  Frequency: 3x week  Duration in visits: 2  Duration in weeks: 1  Plan of Care beginning date: 2019  Plan of Care expiration date: 2019  Treatment plan discussed with: patient and family        Subjective Evaluation    History of Present Illness  Mechanism of injury: 3-14-19:  Pt seen for f/u to measure for garments as error made in fitment of R UE nighttime [employer] relax garment  Quality of life: poor    Pain  Current pain ratin  At best pain ratin  At worst pain rating: 3  Location: R UE (hand/wrist)  Quality: dull ache, discomfort, sharp, squeezing, tight, radiating and needle-like  Aggravating factors: walking, standing and nothing  Progression: improved      Diagnostic Tests  X-ray: abnormal  Treatments  Previous treatment: physical therapy  Patient Goals  Patient goals for therapy: independence with ADLs/IADLs, return to sport/leisure activities, decreased pain and decreased edema  Patient goal: Proper fitting garment            Objective     Palpation     Additional Palpation Details  Moderate ttp B iliac crest into distal LB paraspinals w/ mod restriction B PSIS region  Active Range of Motion     Additional Active Range of Motion Details  LB flexion WNL  LB extension limited 15%  LB SB limited 25% L and R    Muscle Activation     Additional Muscle Activation Details  Poor core utilization w/ limited LB extensor and abdominal strength 3+/5  General Comments:       Ankle/Foot Comments   Physical assessment:  Palpation: Minimal heaviness, slight reduction in girth  Skin Mobility: Minimal restriction and fibrosis R upper arm  Skin color: WNL  Pitting: Minimal  Wound presence: none  Wound size: n/a  Wound color: n/a  Stemmer's Sign: +  Gait assessment: WNL, no AD  Transfer status:  WNL  Ability to don/doff clothing/garments: I          Flowsheet Rows      Most Recent Value   Index Finger   R Index Finger Initial Girth  5 5 cm   Palmar Crease   R Palmar Crease Initial Girth  17 25 cm   Wrist   R Wrist Initial Girth  15 cm   W+10cm   R W+10cm Initial Girth  20 25 cm   W+20cm   R W+20cm Initial Girth  24 cm W+30cm   R W+30cm Initial Girth  28 75 cm   Elbow Joint   R Elbow Joint Initial Girth  23 cm   W+40cm   R W+40cm Initial Girth  33 25 cm                      Precautions: R UE lymphedema    Daily Treatment Diary     Manual  1-11 1-30 3-14          MLD R UE jph            Compression bandaging jph            R UE measurements   jph +garment fit                        x50' x50' x45'              Exercise Diary                                                                                                                                                                                                                                                                                      Modalities

## 2019-03-14 NOTE — LETTER
2019    Royal Krista MD   [facility] [address]    Patient: Woody Nguyen   YOB: 1955   Date of Visit: 3/14/2019     Encounter Diagnosis     ICD-10-CM    1  Lymphedema I89 0        Dear Dr Ibarra Pen:    Please review the attached Plan of Care from [facility] recent visit  Please verify that you agree therapy should continue by signing the attached document and sending it back to our office  If you have any questions or concerns, please don't hesitate to call  Sincerely,    Maricarmen Dover, PT      Referring Provider:      I certify that I have read the below Plan of Care and certify the need for these services furnished under this plan of treatment while under my care  Royal Krista MD   [facility]  [address]  [address]  VIA Facsimile: [phone]          PT Evaluation  and PT Discharge    Today's date: 3/14/2019  Patient name: Woody Nguyen  : 1955  MRN: [id number]  Referring provider: Ramonita Torres MD  Dx:   Encounter Diagnosis     ICD-10-CM    1  Lymphedema I89 0                   Assessment  Assessment details: Pt presents w/  R UE lymphedema of chronic nature related to breast CA hx  Pt had been measured 19 for Jobst night time garment and Juzo custom sleeve w/ gauntlet  Forms to be scanned in and faxed to DME provider [employer] per pt request  Now presents for re-measurement to correct Jobst night time garment w/ slight modification made to measurements to reflect recent reduction in overall volume  Correction suggested thru forearm measurements CD and CE as it appears garment manufactured utilizing error in CE measurement thru   Pt now d/c care in favor of 6 mo f/u     Impairments: abnormal gait, activity intolerance, impaired physical strength, lacks appropriate home exercise program and pain with function  Other impairment: R UE lymphedema  Understanding of Dx/Px/POC: good   Prognosis: good    Goals  ST  Pt I in self skin care within 2-4 wks  2  Volume reduction of affected limb by >25% within 2-4 wks  3  Reduce PN <3/10 w/ all activity within 2-4 wks  LT  Reduce PN <1/10 w/ all functional activity within 4-6 wks  2  Pt I in self MLD within 4-6 wks  3  Pt I in don/doffing LE compression garments within 4 wks  4  Volume reduction of affected limb by >50% within 4 wks  5  Pt I in phase 2 CDT protocol including self MLD, skin maintenance, and don/doffing garments by discharge    Plan  Patient would benefit from: skilled PT  Planned therapy interventions: manual therapy, massage, joint mobilization, strengthening, stretching, therapeutic exercise, patient education and home exercise program  Frequency: 3x week  Duration in visits: 2  Duration in weeks: 1  Plan of Care beginning date: 2019  Plan of Care expiration date: 2019  Treatment plan discussed with: patient and family        Subjective Evaluation    History of Present Illness  Mechanism of injury: 3-14-19:  Pt seen for f/u to measure for garments as error made in fitment of R UE nighttime Jobst relax garment  Quality of life: poor    Pain  Current pain ratin  At best pain ratin  At worst pain rating: 3  Location: R UE (hand/wrist)  Quality: dull ache, discomfort, sharp, squeezing, tight, radiating and needle-like  Aggravating factors: walking, standing and nothing  Progression: improved      Diagnostic Tests  X-ray: abnormal  Treatments  Previous treatment: physical therapy  Patient Goals  Patient goals for therapy: independence with ADLs/IADLs, return to sport/leisure activities, decreased pain and decreased edema  Patient goal: Proper fitting garment            Objective     Palpation     Additional Palpation Details  Moderate ttp B iliac crest into distal LB paraspinals w/ mod restriction B PSIS region       Active Range of Motion     Additional Active Range of Motion Details  LB flexion WNL  LB extension limited 15%  LB SB limited 25% L and R    Muscle Activation     Additional Muscle Activation Details  Poor core utilization w/ limited LB extensor and abdominal strength 3+/5  General Comments:       Ankle/Foot Comments   Physical assessment:  Palpation: Minimal heaviness, slight reduction in girth  Skin Mobility: Minimal restriction and fibrosis R upper arm  Skin color: WNL  Pitting: Minimal  Wound presence: none  Wound size: n/a  Wound color: n/a  Stemmer's Sign: +  Gait assessment: WNL, no AD  Transfer status:  WNL  Ability to don/doff clothing/garments: I          Flowsheet Rows      Most Recent Value   Index Finger   R Index Finger Initial Girth  5 5 cm   Palmar Crease   R Palmar Crease Initial Girth  17 25 cm   Wrist   R Wrist Initial Girth  15 cm   W+10cm   R W+10cm Initial Girth  20 25 cm   W+20cm   R W+20cm Initial Girth  24 cm   W+30cm   R W+30cm Initial Girth  28 75 cm   Elbow Joint   R Elbow Joint Initial Girth  23 cm   W+40cm   R W+40cm Initial Girth  33 25 cm                      Precautions: R UE lymphedema    Daily Treatment Diary     Manual  1-11 1-30 3-14          MLD R UE jph            Compression bandaging jph            R UE measurements   jph +garment fit                        x50' x50' x45'              Exercise Diary                                                                                                                                                                                                                                                                                      Modalities

## 2019-07-30 ENCOUNTER — EVALUATION (OUTPATIENT)
Dept: PHYSICAL THERAPY | Facility: CLINIC | Age: 64
End: 2019-07-30
Payer: MEDICARE

## 2019-07-30 DIAGNOSIS — I89.0 LYMPHEDEMA: Primary | ICD-10-CM

## 2019-07-30 PROCEDURE — 97163 PT EVAL HIGH COMPLEX 45 MIN: CPT | Performed by: PHYSICAL THERAPIST

## 2019-07-30 NOTE — PROGRESS NOTES
PT Evaluation     Today's date: 2019  Patient name: Brittney Fischer  : 1955  MRN: [id number]  Referring provider: Chastity Vides MD  Dx:   Encounter Diagnosis     ICD-10-CM    1  Lymphedema I89 0                   Assessment  Assessment details: Pt presents w/  R UE lymphedema of chronic nature related to breast CA hx  More recent increase in girth noted t/o R UE, most notable within R upper arm  Reviewed pt's garments which appear intact an appropriate  Skin integrity is fair, mild fibrosis noted   + Stemmer's is also noted  Appropriate for resuming phase 1 CDT protocol including MLD and compression bandaging to optimize reduction and at that time will again review garment fitment  Impairments: abnormal gait, activity intolerance, impaired physical strength, lacks appropriate home exercise program and pain with function  Other impairment: R UE lymphedema  Understanding of Dx/Px/POC: good   Prognosis: good    Goals  ST  Pt I in self skin care within 2-4 wks  2  Volume reduction of affected limb by >25% within 2-4 wks  3  Reduce PN <1/10 w/ all activity within 2-4 wks  LT  Pt I in self MLD within 4-6 wks  2  Pt I in don/doffing LE compression garments within 4 wks  3  Volume reduction of affected limb by >50% within 4 wks  4   Pt I in phase 2 CDT protocol including self MLD, skin maintenance, and don/doffing garments by discharge    Plan  Patient would benefit from: skilled PT  Planned therapy interventions: manual therapy, massage, patient education, home exercise program and compression  Frequency: 2x week  Duration in visits: 10  Duration in weeks: 5  Plan of Care beginning date: 2019  Plan of Care expiration date: 9/3/2019  Treatment plan discussed with: patient        Subjective Evaluation    History of Present Illness  Mechanism of injury: 19 Update:  Pt returns for assessment of ongoing R UE lymphedema which she reports has worsened as of late feeling heavier within her upper arm especially  Pt admits she has maintained garments daily but has not bandaged for some time  Pt admits adding Rx medication for anti-depressant but reports still having frustrations w/ management of lymphedema sx's which affects her social life, daily activity, and ability to travel  Pt does also report recent x-rays of thumb + for OA which aggravates her at times  Quality of life: poor    Pain  Current pain ratin  At best pain ratin  At worst pain ratin  Location: R UE + Thumb  Quality: dull ache, discomfort, squeezing, tight and pressure  Aggravating factors: nothing  Progression: worsening      Diagnostic Tests  No diagnostic tests performed  Treatments  Previous treatment: physical therapy  Patient Goals  Patient goals for therapy: independence with ADLs/IADLs, return to sport/leisure activities, decreased pain and decreased edema  Patient goal: Reduce R UE edema            Objective     Palpation     Additional Palpation Details  Moderate ttp B iliac crest into distal LB paraspinals w/ mod restriction B PSIS region  Active Range of Motion     Additional Active Range of Motion Details  LB flexion WNL  LB extension limited 15%  LB SB limited 25% L and R    Muscle Activation     Additional Muscle Activation Details  Poor core utilization w/ limited LB extensor and abdominal strength 3+/5  General Comments: Ankle/Foot Comments   Physical assessment:  Palpation: Mild to Mod heaviness  Skin Mobility: Mild restriction and fibrosis R upper arm  Skin color: WNL  Pitting: Minimal  Wound presence: none  Wound size: n/a  Wound color: n/a  Stemmer's Sign: +  Gait assessment: WNL, no AD  Transfer status:  WNL  Ability to don/doff clothing/garments:  I            Flowsheet Rows      Most Recent Value   girth measurments   UE Girth Measurments  Elbow Joint [elbow]   Index Finger   R Index Finger Initial Girth  5 5 cm   r Index Finger Updated Girth  5 5 cm   R Index Finger Girth Calculation  0 cm   Palmar Crease   R Palmar Crease Initial Girth  17 25 cm   R Palmar Crease Updated Girth  17 4 cm   R Palmar Crease Girth Calculation  0 15 cm   Wrist   R Wrist Initial Girth  15 cm   R Wrist Updated Girth  15 1 cm   R Wrist Girth Calculation  0 1 cm   W+10cm   R W+10cm Initial Girth  20 25 cm   R W+10cm Updated Girth  21 5 cm   R W+10cm Girth Calculation  1 25 cm   W+20cm   R W+20cm Initial Girth  24 cm   R W+20cm Updated Girth  24 75 cm   R W+20cm Girth Calculation  0 75 cm   W+30cm   R W+30cm Initial Girth  28 75 cm   R W+30cm Updated Girth  31 25 cm   R W+30cm Girth Calculation  2 5 cm   Elbow Joint   R Elbow Joint Initial Girth  23 cm   R Elbow Joint Updated Girth  24 cm   R Elbow Joint Girth Calculation  1 cm   W+40cm   R W+40cm Initial Girth  33 25 cm   R W+40cm Updated Girth  35 25 cm   R W+40cm Girth Calculation  2 cm                      Precautions: R UE lymphedema    Daily Treatment Diary     Manual  7-30            MLD R UE NV            Compression bandaging [facility]            R UE measurements                                           Exercise Diary                                                                                                                                                                                                                                                                                      Modalities

## 2019-07-30 NOTE — LETTER
2019    Sri Vieyra, [address] [facility] [address] [place] [address]    Patient: Sherry Hurtado   YOB: 1955   Date of Visit: 2019     Encounter Diagnosis     ICD-10-CM    1  Lymphedema I89 0        Dear Dr Nava Kingdom: Thank you for your recent referral of Sherry Hurtado  Please review the attached evaluation summary from Shonna's recent visit  Please verify that you agree with the plan of care by signing the attached order  If you have any questions or concerns, please do not hesitate to call  I sincerely appreciate the opportunity to share in the care of one of your patients and hope to have another opportunity to work with you in the near future  Sincerely,    Ana Luisa Justin, PT      Referring Provider:      I certify that I have read the below Plan of Care and certify the need for these services furnished under this plan of treatment while under my care  Sri Vieyra, [address] [address]  VIA Facsimile: [phone]          PT Evaluation     Today's date: 2019  Patient name: Sherry Hurtado  : 1955  MRN: [id number]  Referring provider: Colton Mays MD  Dx:   Encounter Diagnosis     ICD-10-CM    1  Lymphedema I89 0                   Assessment  Assessment details: Pt presents w/  R UE lymphedema of chronic nature related to breast CA hx  More recent increase in girth noted t/o R UE, most notable within R upper arm  Reviewed pt's garments which appear intact an appropriate  Skin integrity is fair, mild fibrosis noted   + Stemmer's is also noted  Appropriate for resuming phase 1 CDT protocol including MLD and compression bandaging to optimize reduction and at that time will again review garment fitment     Impairments: abnormal gait, activity intolerance, impaired physical strength, lacks appropriate home exercise program and pain with function  Other impairment: R UE lymphedema  Understanding of Dx/Px/POC: good   Prognosis: good    Goals  ST  Pt I in self skin care within 2-4 wks  2  Volume reduction of affected limb by >25% within 2-4 wks  3  Reduce PN <1/10 w/ all activity within 2-4 wks  LT  Pt I in self MLD within 4-6 wks  2  Pt I in don/doffing LE compression garments within 4 wks  3  Volume reduction of affected limb by >50% within 4 wks  4  Pt I in phase 2 CDT protocol including self MLD, skin maintenance, and don/doffing garments by discharge    Plan  Patient would benefit from: skilled PT  Planned therapy interventions: manual therapy, massage, patient education, home exercise program and compression  Frequency: 2x week  Duration in visits: 10  Duration in weeks: 5  Plan of Care beginning date: 2019  Plan of Care expiration date: 9/3/2019  Treatment plan discussed with: patient        Subjective Evaluation    History of Present Illness  Mechanism of injury: 19 Update:  Pt returns for assessment of ongoing R UE lymphedema which she reports has worsened as of late feeling heavier within her upper arm especially  Pt admits she has maintained garments daily but has not bandaged for some time  Pt admits adding Rx medication for anti-depressant but reports still having frustrations w/ management of lymphedema sx's which affects her social life, daily activity, and ability to travel  Pt does also report recent x-rays of thumb + for OA which aggravates her at times     Quality of life: poor    Pain  Current pain ratin  At best pain ratin  At worst pain ratin  Location: R UE + Thumb  Quality: dull ache, discomfort, squeezing, tight and pressure  Aggravating factors: nothing  Progression: worsening      Diagnostic Tests  No diagnostic tests performed  Treatments  Previous treatment: physical therapy  Patient Goals  Patient goals for therapy: independence with ADLs/IADLs, return to sport/leisure activities, decreased pain and decreased edema  Patient goal: Reduce R UE edema            Objective     Palpation     Additional Palpation Details  Moderate ttp B iliac crest into distal LB paraspinals w/ mod restriction B PSIS region  Active Range of Motion     Additional Active Range of Motion Details  LB flexion WNL  LB extension limited 15%  LB SB limited 25% L and R    Muscle Activation     Additional Muscle Activation Details  Poor core utilization w/ limited LB extensor and abdominal strength 3+/5  General Comments: Ankle/Foot Comments   Physical assessment:  Palpation: Mild to Mod heaviness  Skin Mobility: Mild restriction and fibrosis R upper arm  Skin color: WNL  Pitting: Minimal  Wound presence: none  Wound size: n/a  Wound color: n/a  Stemmer's Sign: +  Gait assessment: WNL, no AD  Transfer status:  WNL  Ability to don/doff clothing/garments:  I            Flowsheet Rows      Most Recent Value   girth measurments   UE Girth Measurments  Elbow Joint [elbow]   Index Finger   R Index Finger Initial Girth  5 5 cm   r Index Finger Updated Girth  5 5 cm   R Index Finger Girth Calculation  0 cm   Palmar Crease   R Palmar Crease Initial Girth  17 25 cm   R Palmar Crease Updated Girth  17 4 cm   R Palmar Crease Girth Calculation  0 15 cm   Wrist   R Wrist Initial Girth  15 cm   R Wrist Updated Girth  15 1 cm   R Wrist Girth Calculation  0 1 cm   W+10cm   R W+10cm Initial Girth  20 25 cm   R W+10cm Updated Girth  21 5 cm   R W+10cm Girth Calculation  1 25 cm   W+20cm   R W+20cm Initial Girth  24 cm   R W+20cm Updated Girth  24 75 cm   R W+20cm Girth Calculation  0 75 cm   W+30cm   R W+30cm Initial Girth  28 75 cm   R W+30cm Updated Girth  31 25 cm   R W+30cm Girth Calculation  2 5 cm   Elbow Joint   R Elbow Joint Initial Girth  23 cm   R Elbow Joint Updated Girth  24 cm   R Elbow Joint Girth Calculation  1 cm   W+40cm   R W+40cm Initial Girth  33 25 cm   R W+40cm Updated Girth  35 25 cm   R W+40cm Girth Calculation  2 cm Precautions: R UE lymphedema    Daily Treatment Diary     Manual  7-30            MLD R UE NV            Compression bandaging JP            R UE measurements                                           Exercise Diary                                                                                                                                                                                                                                                                                      Modalities

## 2019-08-05 ENCOUNTER — TRANSCRIBE ORDERS (OUTPATIENT)
Dept: PHYSICAL THERAPY | Facility: CLINIC | Age: 64
End: 2019-08-05

## 2019-08-05 DIAGNOSIS — I89.0 CHRONIC ACQUIRED LYMPHEDEMA: Primary | ICD-10-CM

## 2019-08-07 ENCOUNTER — OFFICE VISIT (OUTPATIENT)
Dept: PHYSICAL THERAPY | Facility: CLINIC | Age: 64
End: 2019-08-07
Payer: MEDICARE

## 2019-08-07 DIAGNOSIS — I89.0 LYMPHEDEMA: Primary | ICD-10-CM

## 2019-08-07 PROCEDURE — 97140 MANUAL THERAPY 1/> REGIONS: CPT | Performed by: PHYSICAL THERAPIST

## 2019-08-07 NOTE — PROGRESS NOTES
Daily Note     Today's date: 2019  Patient name: Brittney Fischer  : 1955  MRN: [id number]  Referring provider: Chastity Vides MD  Dx:   Encounter Diagnosis     ICD-10-CM    1  Lymphedema I89 0                   Subjective: Pt came to therapy with compression garments and bandages to initiate treatment  Objective: See treatment diary below      Precautions: R UE lymphedema    Daily Treatment Diary     Manual             MLD R UE NV arb           Compression bandaging [facility] arb           R UE measurements                            x55'               Exercise Diary                                                                                                                                                                                                                                                                                      Modalities                                                             Assessment: Tolerated treatment well  Pt tolerated MLD with no adverse effects  PT used pt's garment under bandages this treatment  Also tried cotton on hand under garment due to incraesed pain in [facility] joint- pt stated it felt better- follow up nv about symptoms  Used Patient would benefit from continued PT to address swelling in R UE  Plan: Continue per plan of care

## 2019-08-09 ENCOUNTER — APPOINTMENT (OUTPATIENT)
Dept: PHYSICAL THERAPY | Facility: CLINIC | Age: 64
End: 2019-08-09
Payer: MEDICARE

## 2019-08-13 ENCOUNTER — OFFICE VISIT (OUTPATIENT)
Dept: PHYSICAL THERAPY | Facility: CLINIC | Age: 64
End: 2019-08-13
Payer: MEDICARE

## 2019-08-13 DIAGNOSIS — I89.0 LYMPHEDEMA: Primary | ICD-10-CM

## 2019-08-13 PROCEDURE — 97140 MANUAL THERAPY 1/> REGIONS: CPT | Performed by: PHYSICAL THERAPIST

## 2019-08-13 NOTE — PROGRESS NOTES
Daily Note     Today's date: 2019  Patient name: Yair Baumann  : 1955  MRN: [id number]  Referring provider: Ciro Mock MD  Dx:   Encounter Diagnosis     ICD-10-CM    1  Lymphedema I89 0                   Subjective: Pt reports a + response to use of cotton last session in regards to minimizing thumb PN, also reports she bandaged her own arm with thumb in a more "inward" position which she felt worked better  Objective: See treatment diary below    Precautions: R UE lymphedema    Daily Treatment Diary     Manual            MLD R UE NV arb jph          Compression bandaging JPH arb jph          R UE measurements                            x55' x55'              Exercise Diary                                                                                                                                                                                                                                                                                      Modalities                                                             Assessment: Tolerated treatment well  Patient would benefit from continued PT to address swelling in R UE  Pt responds well this day to MLD, Mod heaviness noted thru triceps region and anterior deltoid region of shldr into lateral pectorals  Trialed use of nighttime garment w/ bandaging over top, held bandaging of hand this day to avoid stress to thumb and will assess response NV  Plan: Continue per plan of care

## 2019-08-15 ENCOUNTER — OFFICE VISIT (OUTPATIENT)
Dept: PHYSICAL THERAPY | Facility: CLINIC | Age: 64
End: 2019-08-15
Payer: MEDICARE

## 2019-08-15 DIAGNOSIS — I89.0 LYMPHEDEMA: Primary | ICD-10-CM

## 2019-08-15 PROCEDURE — 97140 MANUAL THERAPY 1/> REGIONS: CPT | Performed by: PHYSICAL THERAPIST

## 2019-08-15 NOTE — PROGRESS NOTES
Daily Note     Today's date: 8/15/2019  Patient name: Julianna Perez  : 1955  MRN: [id number]  Referring provider: Jose C Quinn MD  Dx:   Encounter Diagnosis     ICD-10-CM    1  Lymphedema I89 0                   Subjective: Pt reports doing well w/ bandaging technique last session that left more freedom for hand  Objective: See treatment diary below    Precautions: R UE lymphedema    Daily Treatment Diary     Manual   8 8-15         MLD R UE NV arb jph jph         Compression bandaging JPH arb jph jph         R UE measurements                            x55' x55' x55'             Exercise Diary                                                                                                                                                                                                                                                                                      Modalities                                                         Assessment: Tolerated treatment well  Patient would benefit from continued PT  Slight reduction in heaviness of triceps region L arm this day, no visible increase in edema of  Hand or wrist region this day, clear to cont bandaging from wrist to axilla but will cont to monitor response  Plan: Continue per plan of care

## 2019-08-20 ENCOUNTER — TRANSCRIBE ORDERS (OUTPATIENT)
Dept: ADMINISTRATIVE | Facility: HOSPITAL | Age: 64
End: 2019-08-20

## 2019-08-20 ENCOUNTER — APPOINTMENT (OUTPATIENT)
Dept: RADIOLOGY | Facility: CLINIC | Age: 64
End: 2019-08-20
Payer: MEDICARE

## 2019-08-20 ENCOUNTER — OFFICE VISIT (OUTPATIENT)
Dept: PHYSICAL THERAPY | Facility: CLINIC | Age: 64
End: 2019-08-20
Payer: MEDICARE

## 2019-08-20 DIAGNOSIS — I89.0 LYMPHEDEMA: Primary | ICD-10-CM

## 2019-08-20 DIAGNOSIS — M25.552 LEFT HIP PAIN: ICD-10-CM

## 2019-08-20 DIAGNOSIS — M25.552 LEFT HIP PAIN: Primary | ICD-10-CM

## 2019-08-20 PROCEDURE — 73502 X-RAY EXAM HIP UNI 2-3 VIEWS: CPT

## 2019-08-20 PROCEDURE — 97140 MANUAL THERAPY 1/> REGIONS: CPT | Performed by: PHYSICAL THERAPIST

## 2019-08-20 NOTE — PROGRESS NOTES
Daily Note     Today's date: 2019  Patient name: Merari Gibbons  : 1955  MRN: [id number]  Referring provider: Brianda Fontaine MD  Dx:   Encounter Diagnosis     ICD-10-CM    1  Lymphedema I89 0                   Subjective: Pt reports she feels her shldr region is reducing and softening  Objective: See treatment diary below    Precautions: R UE lymphedema    Daily Treatment Diary     Manual   8-13 8-15 8-20        MLD R UE NV arb jph jph jph        Compression bandaging JPH arb jph jph jph        R UE measurements                            x55' x55' x55' x55'            Exercise Diary                                                                                                                                                                                                                                                                                      Modalities                                                         Assessment: Tolerated treatment well  Patient would benefit from continued PT  L upper arm edema seems slightly more significant this day but a reduction is noted within shldr itself w/ softening of skin fibrosis  Plan: Continue per plan of care

## 2019-08-23 ENCOUNTER — OFFICE VISIT (OUTPATIENT)
Dept: PHYSICAL THERAPY | Facility: CLINIC | Age: 64
End: 2019-08-23
Payer: MEDICARE

## 2019-08-23 DIAGNOSIS — I89.0 LYMPHEDEMA: Primary | ICD-10-CM

## 2019-08-23 PROCEDURE — 97140 MANUAL THERAPY 1/> REGIONS: CPT | Performed by: PHYSICAL THERAPIST

## 2019-09-03 ENCOUNTER — OFFICE VISIT (OUTPATIENT)
Dept: PHYSICAL THERAPY | Facility: CLINIC | Age: 64
End: 2019-09-03
Payer: MEDICARE

## 2019-09-03 DIAGNOSIS — I89.0 LYMPHEDEMA: Primary | ICD-10-CM

## 2019-09-03 PROCEDURE — 97140 MANUAL THERAPY 1/> REGIONS: CPT | Performed by: PHYSICAL THERAPIST

## 2019-09-03 NOTE — PROGRESS NOTES
Daily Note     Today's date: 9/3/2019  Patient name: Brittney Fischer  : 1955  MRN: [id number]  Referring provider: Chastity Vides MD  Dx:   Encounter Diagnosis     ICD-10-CM    1  Lymphedema I89 0                   Subjective: Pt c/o some heaviness in upper triceps/posterior arm region w/ not having tx x 1 wk reporting also feleing some heaviness toward thumb/hand which she questions if it could be lymph vs arthritis  Objective: See treatment diary below    Precautions: R UE lymphedema    Daily Treatment Diary     Manual   8-13 8-15 8-20 8-23 9-3      MLD R UE NV arb jph jph jph jph jph      Compression bandaging JPH arb jph jph jph jph jph      R UE measurements                            x55' x55' x55' x55' x50' x50'          Exercise Diary                                                                                                                                                                                                                                                                                      Modalities                                                         Assessment: Tolerated treatment well  Patient would benefit from continued PT  Mod heaviness noted thru R posterior upper arm/triceps region this day w/ slight increase notd toward elbow region, more sig than previous session  Plan: Continue per plan of care

## 2019-09-05 ENCOUNTER — OFFICE VISIT (OUTPATIENT)
Dept: PHYSICAL THERAPY | Facility: CLINIC | Age: 64
End: 2019-09-05
Payer: MEDICARE

## 2019-09-05 DIAGNOSIS — I89.0 LYMPHEDEMA: Primary | ICD-10-CM

## 2019-09-05 PROCEDURE — 97140 MANUAL THERAPY 1/> REGIONS: CPT | Performed by: PHYSICAL THERAPIST

## 2019-09-05 NOTE — PROGRESS NOTES
Daily Note     Today's date: 2019  Patient name: James Goss  : 1955  MRN: [id number]  Referring provider: Mitch Larkin MD  Dx:   Encounter Diagnosis     ICD-10-CM    1  Lymphedema I89 0                   Subjective: Pt reports its a "rough day" today citing some personal stressors, still a PN that persists in her thumb and some heaviness in her upper R arm  Objective: See treatment diary below    Precautions: R UE lymphedema    Daily Treatment Diary     Manual   8-13 8-15 8-20 8-23 9-3 9-5     MLD R UE NV arb jph jph jph jph jph jph     Compression bandaging JPH arb jph jph jph jph jph jph     R UE measurements                            x55' x55' x55' x55' x50' x50' x55'         Exercise Diary                                                                                                                                                                                                                                                                                      Modalities                                                         Assessment: Tolerated treatment well  Patient would benefit from continued PT  Reviewed pt's hand x-rays this day, + for OA in thumb but does not delineate level of severity  Pt admits last session of bandaging that included hand did not create any worsened thumb PN  Plan: Continue per plan of care

## 2019-09-10 ENCOUNTER — OFFICE VISIT (OUTPATIENT)
Dept: PHYSICAL THERAPY | Facility: CLINIC | Age: 64
End: 2019-09-10
Payer: MEDICARE

## 2019-09-10 DIAGNOSIS — I89.0 LYMPHEDEMA: Primary | ICD-10-CM

## 2019-09-10 PROCEDURE — 97140 MANUAL THERAPY 1/> REGIONS: CPT | Performed by: PHYSICAL THERAPIST

## 2019-09-10 NOTE — PROGRESS NOTES
Daily Note     Today's date: 9/10/2019  Patient name: Kathy Henao  : 1955  MRN: [id number]  Referring provider: Kimani Aburto MD  Dx:   Encounter Diagnosis     ICD-10-CM    1  Lymphedema I89 0                   Subjective: Pt reports she feels her hand is doing better overall, fits some cotton in when wrapping at times for extra padding  Objective: See treatment diary below    Precautions: R UE lymphedema    Daily Treatment Diary     Manual   8-13 8-15 8-20 8-23 9-3 9-5 9-10    MLD R UE NV arb jph jph jph jph jph jph jph    Compression bandaging JPH arb jph jph jph jph jph jph jph    R UE measurements                            x55' x55' x55' x55' x50' x50' x55' x50'        Exercise Diary                                                                                                                                                                                                                                                                                      Modalities                                                         Assessment: Tolerated treatment well  Patient would benefit from continued PT  Still some heaviness that persists in triceps region, this tends to fluctuate but remains pt's greatest problem area, addressed this day w/ slightly more aggressive bandaging, will assess response NV  Plan: Continue per plan of care

## 2019-09-12 ENCOUNTER — OFFICE VISIT (OUTPATIENT)
Dept: PHYSICAL THERAPY | Facility: CLINIC | Age: 64
End: 2019-09-12
Payer: MEDICARE

## 2019-09-12 DIAGNOSIS — I89.0 LYMPHEDEMA: Primary | ICD-10-CM

## 2019-09-12 PROCEDURE — 97140 MANUAL THERAPY 1/> REGIONS: CPT | Performed by: PHYSICAL THERAPIST

## 2019-09-12 NOTE — PROGRESS NOTES
Daily Note     Today's date: 2019  Patient name: Hira Johnston  : 1955  MRN: [id number]  Referring provider: Barbie Thompson MD  Dx:   Encounter Diagnosis     ICD-10-CM    1  Lymphedema I89 0                   Subjective: Pt reports her arm is feeling good except for the axilla into proximal triceps "dalila area is screaming, just real tight "    Objective: See treatment diary below    Precautions: R UE lymphedema    Daily Treatment Diary     Manual   8-13 8-15 8-20 8-23 9-3 9-5 9-10 9-12   MLD R UE NV arb jph jph jph jph jph jph jph jph   Compression bandaging JPH arb jph jph jph jph jph jph jph jph   R UE measurements                            x55' x55' x55' x55' x50' x50' x55' x50' x55'       Exercise Diary                                                                                                                                                                                                                                                                                      Modalities                                                         Assessment: Tolerated treatment well  Patient would benefit from continued PT  Mild heaviness that remains R axilla/proximal triceps but sx's improved overall t/o R UE w/ anticipated transition to phase 2 CDT - self mngmt by end of next wk  Plan: Continue per plan of care

## 2019-09-17 ENCOUNTER — OFFICE VISIT (OUTPATIENT)
Dept: PHYSICAL THERAPY | Facility: CLINIC | Age: 64
End: 2019-09-17
Payer: MEDICARE

## 2019-09-17 DIAGNOSIS — I89.0 LYMPHEDEMA: Primary | ICD-10-CM

## 2019-09-17 PROCEDURE — 97140 MANUAL THERAPY 1/> REGIONS: CPT | Performed by: PHYSICAL THERAPIST

## 2019-09-19 ENCOUNTER — OFFICE VISIT (OUTPATIENT)
Dept: PHYSICAL THERAPY | Facility: CLINIC | Age: 64
End: 2019-09-19
Payer: MEDICARE

## 2019-09-19 DIAGNOSIS — I89.0 LYMPHEDEMA: Primary | ICD-10-CM

## 2019-09-19 PROCEDURE — 97140 MANUAL THERAPY 1/> REGIONS: CPT | Performed by: PHYSICAL THERAPIST

## 2019-09-19 NOTE — PROGRESS NOTES
Daily Note + PT Discharge    Today's date: 2019  Patient name: Lidia Bonilla  : 1955  MRN: [id number]  Referring provider: Geno Childs MD  Dx:   Encounter Diagnosis     ICD-10-CM    1  Lymphedema I89 0                   Subjective: Pt reports doing well, managing her arm w/ self wrapping at home, use of daytime and nighttime garments, and feels her arm has reduced nicely again  Agrees to d/c skilled PT to phase  CDT protocol at this time       Objective: See treatment diary below  Flowsheet Rows      Most Recent Value   girth measurments   UE Girth Measurments  Elbow Joint [elbow]   Index Finger   R Index Finger Initial Girth  5 5 cm   r Index Finger Updated Girth  5 4 cm   R Index Finger Girth Calculation  -0 1 cm   Palmar Crease   R Palmar Crease Initial Girth  17 25 cm   R Palmar Crease Updated Girth  17 25 cm   R Palmar Crease Girth Calculation  0 cm   Wrist   R Wrist Initial Girth  15 cm   R Wrist Updated Girth  14 5 cm   R Wrist Girth Calculation  -0 5 cm   W+10cm   R W+10cm Initial Girth  20 25 cm   R W+10cm Updated Girth  20 25 cm   R W+10cm Girth Calculation  0 cm   W+20cm   R W+20cm Initial Girth  24 cm   R W+20cm Updated Girth  23 25 cm   R W+20cm Girth Calculation  -0 75 cm   W+30cm   R W+30cm Initial Girth  28 75 cm   R W+30cm Updated Girth  29 25 cm   R W+30cm Girth Calculation  0 5 cm   Elbow Joint   R Elbow Joint Initial Girth  23 cm   R Elbow Joint Updated Girth  22 75 cm   R Elbow Joint Girth Calculation  -0 25 cm   W+40cm   R W+40cm Initial Girth  33 25 cm   R W+40cm Updated Girth  33 25 cm   R W+40cm Girth Calculation  0 cm          Precautions: R UE lymphedema    Daily Treatment Diary     Manual         9-10 9-12   MLD R UE jph [facility]   Compression bandaging [facility]   R UE measurements                           x55' x60'       x50' x55'       Exercise Diary Modalities                                                       Goals  ST  Pt I in self skin care within 2-4 wks -MET  2  Volume reduction of affected limb by >25% within 2-4 wks-MET  3  Reduce PN <1/10 w/ all activity within 2-4 wks -Partially met  LT  Pt I in self MLD within 4-6 wks -MET  2  Pt I in don/doffing LE compression garments within 4 wks -MET  3  Volume reduction of affected limb by >50% within 4 wks -MET  4  Pt I in phase 2 CDT protocol including self MLD, skin maintenance, and don/doffing garments by discharge -MET    Assessment: D/c skilled PT as goals have been met, arm is well managed, and pt is I in phase 2 CDT protocol  Recommend 6 mo f/u  Plan: D/c skilled PT

## 2019-11-25 ENCOUNTER — EVALUATION (OUTPATIENT)
Dept: PHYSICAL THERAPY | Facility: CLINIC | Age: 64
End: 2019-11-25

## 2019-11-25 NOTE — PROGRESS NOTES
Daily Note - Fitness    Today's date: 2019  Patient name: Kyrie Muñiz  : 1955  MRN: [id number]  Referring provider: Josef Diop MD  Dx: No diagnosis found  Subjective: Pt seen for fitness assessment this day      Objective: See treatment diary below      Assessment: Tolerated treatment well  Patient demonstrated fatigue post treatment      Plan: Continue per plan of care  -Pt to cont fitness routine

## 2020-05-05 ENCOUNTER — OFFICE VISIT (OUTPATIENT)
Dept: PHYSICAL THERAPY | Facility: CLINIC | Age: 65
End: 2020-05-05
Payer: MEDICARE

## 2020-05-05 ENCOUNTER — TRANSCRIBE ORDERS (OUTPATIENT)
Dept: PHYSICAL THERAPY | Facility: CLINIC | Age: 65
End: 2020-05-05

## 2020-05-05 DIAGNOSIS — I89.0 LYMPHEDEMA: Primary | ICD-10-CM

## 2020-05-05 PROCEDURE — 97162 PT EVAL MOD COMPLEX 30 MIN: CPT | Performed by: PHYSICAL THERAPIST

## 2020-06-08 ENCOUNTER — OFFICE VISIT (OUTPATIENT)
Dept: URGENT CARE | Facility: CLINIC | Age: 65
End: 2020-06-08
Payer: MEDICARE

## 2020-06-08 VITALS
WEIGHT: 124.6 LBS | OXYGEN SATURATION: 100 % | BODY MASS INDEX: 23.53 KG/M2 | HEIGHT: 61 IN | HEART RATE: 100 BPM | DIASTOLIC BLOOD PRESSURE: 73 MMHG | SYSTOLIC BLOOD PRESSURE: 115 MMHG | RESPIRATION RATE: 22 BRPM | TEMPERATURE: 98.2 F

## 2020-06-08 DIAGNOSIS — I71.4 ABDOMINAL AORTIC ANEURYSM (AAA) WITHOUT RUPTURE (HCC): ICD-10-CM

## 2020-06-08 DIAGNOSIS — H92.02 EARACHE ON LEFT: Primary | ICD-10-CM

## 2020-06-08 PROCEDURE — G0463 HOSPITAL OUTPT CLINIC VISIT: HCPCS | Performed by: FAMILY MEDICINE

## 2020-06-08 PROCEDURE — 99213 OFFICE O/P EST LOW 20 MIN: CPT | Performed by: FAMILY MEDICINE

## 2021-08-03 ENCOUNTER — EVALUATION (OUTPATIENT)
Dept: PHYSICAL THERAPY | Facility: CLINIC | Age: 66
End: 2021-08-03
Payer: MEDICARE

## 2021-08-03 DIAGNOSIS — I89.0 LYMPHEDEMA: Primary | ICD-10-CM

## 2021-08-03 PROCEDURE — 97163 PT EVAL HIGH COMPLEX 45 MIN: CPT | Performed by: PHYSICAL THERAPIST

## 2021-08-03 NOTE — PROGRESS NOTES
PT Evaluation     Today's date: 8/3/2021  Patient name: Adrianna Griffin  : 1955  MRN: [id number]  Referring provider: Benjamin Corona MD  Dx:   Encounter Diagnosis     ICD-10-CM    1  Lymphedema  I89 0                   Assessment  Assessment details: Pt presents w/  R UE lymphedema of chronic nature related to breast CA hx   Good management of R UE observed w/ slight increase in heaviness thru R axilla and periscapular regions but minimal change in girth measurements thru R UE compared to previous assessment w/ actual reduction thru forearm and elbow region which could be contributed to recent wt loss  Pt does c/o a heaviness sensation now in L axilla for which US is scheduled  to assess given risk factors and PMHx  Recommend several MLD tx sessions and consideration for fitment of new garments, particularly to achieve more optimal compression in R upper arm region  Skilled PT is advised  Impairments: abnormal gait, activity intolerance, impaired physical strength, lacks appropriate home exercise program and pain with function  Other impairment: R UE lymphedema  Understanding of Dx/Px/POC: good   Prognosis: good    Goals  ST  Pt to achieve girth reduction R upper arm within 3 wks  2  Pt to maintain daily and nighttime garments within 1 wk  LT  Fitted for new daytime compression garments within 4 wks  2   Achieve R UE reduced girth measurements thru upper arm >10% within 5 wks    Plan  Patient would benefit from: skilled PT  Planned therapy interventions: manual therapy, massage, patient education, home exercise program and compression  Frequency: 2x week  Duration in visits: 12  Duration in weeks: 6  Plan of Care beginning date: 8/3/2021  Plan of Care expiration date: 2021  Treatment plan discussed with: patient        Subjective Evaluation    History of Present Illness  Mechanism of injury: Presents today for f/u regarding ongoing chronic R UE lymphedema and concerns for recent increase in heaviness of L axilla  Pt reports she feels she is in need of new garments, has been distracted from her normal self care routine after caring for her mother this past yr leading up to her passing  Quality of life: poor    Pain  Current pain ratin  At best pain ratin  At worst pain ratin  Location: R UE + Thumb  Quality: dull ache, discomfort, squeezing, tight and pressure  Aggravating factors: nothing  Progression: worsening      Diagnostic Tests  No diagnostic tests performed  Treatments  Previous treatment: physical therapy  Patient Goals  Patient goals for therapy: independence with ADLs/IADLs, return to sport/leisure activities, decreased pain and decreased edema  Patient goal: Reduce R UE edema            Objective     Palpation     Additional Palpation Details  Moderate ttp B iliac crest into distal LB paraspinals w/ mod restriction B PSIS region  Active Range of Motion     Additional Active Range of Motion Details  LB flexion WNL  LB extension limited 15%  LB SB limited 25% L and R    Muscle Activation     Additional Muscle Activation Details  Poor core utilization w/ limited LB extensor and abdominal strength 3+/5  General Comments:       Ankle/Foot Comments   Physical assessment:  Palpation: Mild heaviness  Skin Mobility: Mild restriction and fibrosis R upper arm  Skin color: WNL  Pitting: Minimal  Wound presence: none  Wound size: n/a  Wound color: n/a  Stemmer's Sign: +  Gait assessment: WNL, no AD  Transfer status:  WNL  Ability to don/doff clothing/garments: I        Flowsheet Rows      Most Recent Value   Index Finger   R Index Finger Initial Girth  5 4 cm  V 5 5 cm = +0 1 cm  (L 5 3cm)     Palmar Crease   R Palmar Crease Initial Girth  17 75 cm  V 17 5 cm = -0 25 cm  (L 18 25 cm)   Wrist   R Wrist Initial Girth  15 cm  V  14 75 cm = -0 25 cm  (L 14 25 cm)   W+10cm   R W+10cm Initial Girth  20 5 cm  V 20 5 cm = 0  (L 20 cm)   W+20cm   R W+20cm Initial Girth  23 25 cm  V 22 75 cm = -0 5 cm  (L 22 5 cm)   W+30cm   R W+30cm Initial Girth  29 5 cm  V 28 25 cm = -0 75 cm  (L 27 cm)   Elbow Joint   R Elbow Joint Initial Girth  22 75 cm  V 21 75 cm = -1 cm  (L 22 cm)   W+40cm   R W+40cm Initial Girth  32 75 cm  V 33 5 cm = +0 75 cm  (L 29 cm)                                   Precautions: R UE lymphedema    Daily Treatment Diary     Manual  8-3            MLD R UE NV + L axilla            Compression bandaging ---            R UE measurements [facility]                                          Exercise Diary                                                                                                                                                                                                                                                                                      Modalities

## 2021-08-03 NOTE — LETTER
August 3, 2021    Clotilde Luther, [address] [facility] [address] [place] [address]    Patient: Lakia Wong   YOB: 1955   Date of Visit: 8/3/2021     Encounter Diagnosis     ICD-10-CM    1  Lymphedema  I89 0        Dear Dr De León Knows: Thank you for your recent referral of Lakia Wong  Please review the attached evaluation summary from Shonna's recent visit  Please verify that you agree with the plan of care by signing the attached order  If you have any questions or concerns, please do not hesitate to call  I sincerely appreciate the opportunity to share in the care of one of your patients and hope to have another opportunity to work with you in the near future  Sincerely,    Breanne Joseph, PT      Referring Provider:      I certify that I have read the below Plan of Care and certify the need for these services furnished under this plan of treatment while under my care  Clotilde Faithjoanne, [place]  [address]  [place] [address]  Via Fax: [phone]          PT Evaluation     Today's date: 8/3/2021  Patient name: Lakia Wong  : 1955  MRN: [id number]  Referring provider: Elpidio Kaminski MD  Dx:   Encounter Diagnosis     ICD-10-CM    1  Lymphedema  I89 0                   Assessment  Assessment details: Pt presents w/  R UE lymphedema of chronic nature related to breast CA hx   Good management of R UE observed w/ slight increase in heaviness thru R axilla and periscapular regions but minimal change in girth measurements thru R UE compared to previous assessment w/ actual reduction thru forearm and elbow region which could be contributed to recent wt loss  Pt does c/o a heaviness sensation now in L axilla for which US is scheduled  to assess given risk factors and PMHx  Recommend several MLD tx sessions and consideration for fitment of new garments, particularly to achieve more optimal compression in R upper arm region   Skilled PT is advised  Impairments: abnormal gait, activity intolerance, impaired physical strength, lacks appropriate home exercise program and pain with function  Other impairment: R UE lymphedema  Understanding of Dx/Px/POC: good   Prognosis: good    Goals  ST  Pt to achieve girth reduction R upper arm within 3 wks  2  Pt to maintain daily and nighttime garments within 1 wk  LT  Fitted for new daytime compression garments within 4 wks  2  Achieve R UE reduced girth measurements thru upper arm >10% within 5 wks    Plan  Patient would benefit from: skilled PT  Planned therapy interventions: manual therapy, massage, patient education, home exercise program and compression  Frequency: 2x week  Duration in visits: 12  Duration in weeks: 6  Plan of Care beginning date: 8/3/2021  Plan of Care expiration date: 2021  Treatment plan discussed with: patient        Subjective Evaluation    History of Present Illness  Mechanism of injury: Presents today for f/u regarding ongoing chronic R UE lymphedema and concerns for recent increase in heaviness of L axilla  Pt reports she feels she is in need of new garments, has been distracted from her normal self care routine after caring for her mother this past yr leading up to her passing  Quality of life: poor    Pain  Current pain ratin  At best pain ratin  At worst pain ratin  Location: R UE + Thumb  Quality: dull ache, discomfort, squeezing, tight and pressure  Aggravating factors: nothing  Progression: worsening      Diagnostic Tests  No diagnostic tests performed  Treatments  Previous treatment: physical therapy  Patient Goals  Patient goals for therapy: independence with ADLs/IADLs, return to sport/leisure activities, decreased pain and decreased edema  Patient goal: Reduce R UE edema            Objective     Palpation     Additional Palpation Details  Moderate ttp B iliac crest into distal LB paraspinals w/ mod restriction B PSIS region       Active Range of Motion     Additional Active Range of Motion Details  LB flexion WNL  LB extension limited 15%  LB SB limited 25% L and R    Muscle Activation     Additional Muscle Activation Details  Poor core utilization w/ limited LB extensor and abdominal strength 3+/5  General Comments:       Ankle/Foot Comments   Physical assessment:  Palpation: Mild heaviness  Skin Mobility: Mild restriction and fibrosis R upper arm  Skin color: WNL  Pitting: Minimal  Wound presence: none  Wound size: n/a  Wound color: n/a  Stemmer's Sign: +  Gait assessment: WNL, no AD  Transfer status:  WNL  Ability to don/doff clothing/garments: I        Flowsheet Rows      Most Recent Value   Index Finger   R Index Finger Initial Girth  5 4 cm  V 5 5 cm = +0 1 cm  (L 5 3cm)     Palmar Crease   R Palmar Crease Initial Girth  17 75 cm  V 17 5 cm = -0 25 cm  (L 18 25 cm)   Wrist   R Wrist Initial Girth  15 cm  V  14 75 cm = -0 25 cm  (L 14 25 cm)   W+10cm   R W+10cm Initial Girth  20 5 cm  V 20 5 cm = 0  (L 20 cm)   W+20cm   R W+20cm Initial Girth  23 25 cm  V 22 75 cm = -0 5 cm  (L 22 5 cm)   W+30cm   R W+30cm Initial Girth  29 5 cm  V 28 25 cm = -0 75 cm  (L 27 cm)   Elbow Joint   R Elbow Joint Initial Girth  22 75 cm  V 21 75 cm = -1 cm  (L 22 cm)   W+40cm   R W+40cm Initial Girth  32 75 cm  V 33 5 cm = +0 75 cm  (L 29 cm)                                   Precautions: R UE lymphedema    Daily Treatment Diary     Manual  8-3            MLD R UE NV + L axilla            Compression bandaging ---            R UE measurements jp- B                                          Exercise Diary                                                                                                                                                                                                                                                                                      Modalities

## 2021-08-05 ENCOUNTER — OFFICE VISIT (OUTPATIENT)
Dept: PHYSICAL THERAPY | Facility: CLINIC | Age: 66
End: 2021-08-05
Payer: MEDICARE

## 2021-08-05 DIAGNOSIS — I89.0 LYMPHEDEMA: Primary | ICD-10-CM

## 2021-08-05 PROCEDURE — 97140 MANUAL THERAPY 1/> REGIONS: CPT | Performed by: PHYSICAL THERAPIST

## 2021-08-05 NOTE — PROGRESS NOTES
Daily Note     Today's date: 2021  Patient name: Ramez Padilla  : 1955  MRN: [id number]  Referring provider: Carolyn Waller MD  Dx:   Encounter Diagnosis     ICD-10-CM    1  Lymphedema  I89 0                   Subjective: Pt presents for tx this day w/ c/o heaviness in axilla B, also brings along print outs of past medical tests for review which appear to be clear from pathologic concern  Objective: See treatment diary below       Assessment: Tolerated treatment well  Patient would benefit from continued PT  Initiated MLD R UE and L axilla + anterior chest this day w/ good response, held on bandaging in favor of nighttime garment which provides more suitable compression than current daytime garment  Plan: Continue per plan of care           Precautions:       Manuals 21            MLD R UE jph            MLD L axilla + upper arm jph                         R UE compression jph            Neuro Re-Ed                                                                                                        Ther Ex                                                                                                                     Ther Activity                                       Gait Training                                       Modalities

## 2021-08-09 ENCOUNTER — OFFICE VISIT (OUTPATIENT)
Dept: PHYSICAL THERAPY | Facility: CLINIC | Age: 66
End: 2021-08-09
Payer: MEDICARE

## 2021-08-09 DIAGNOSIS — I89.0 LYMPHEDEMA: Primary | ICD-10-CM

## 2021-08-09 PROCEDURE — 97140 MANUAL THERAPY 1/> REGIONS: CPT | Performed by: PHYSICAL THERAPIST

## 2021-08-09 NOTE — PROGRESS NOTES
Daily Note     Today's date: 2021  Patient name: Eulalio Dimas  : 1955  MRN: [id number]  Referring provider: Asad Oliva MD  Dx:   Encounter Diagnosis     ICD-10-CM    1  Lymphedema  I89 0                   Subjective: Pt reports persistent and cont'd discomfort in her L axilla to shldr region > than in R which she feels is better managed  Pt feels she will go w/ ordering Elvarex in the near future - will need fitment  Objective: See treatment diary below       Assessment: Tolerated treatment well  Patient would benefit from continued PT  Mod heaviness B axilla into anterior pectoral region noted this day, R arm doing well  Plan: Continue per plan of care           Precautions:       Manuals 8-5-21 8-9           MLD R UE jph jph           MLD L axilla + upper arm jph jph                        R UE compression jph jph           Neuro Re-Ed                                                                                                        Ther Ex                                                                                                                     Ther Activity                                       Gait Training                                       Modalities

## 2021-08-11 ENCOUNTER — OFFICE VISIT (OUTPATIENT)
Dept: PHYSICAL THERAPY | Facility: CLINIC | Age: 66
End: 2021-08-11
Payer: MEDICARE

## 2021-08-11 DIAGNOSIS — I89.0 LYMPHEDEMA: Primary | ICD-10-CM

## 2021-08-11 PROCEDURE — 97140 MANUAL THERAPY 1/> REGIONS: CPT | Performed by: PHYSICAL THERAPIST

## 2021-08-11 NOTE — PROGRESS NOTES
Daily Note     Today's date: 2021  Patient name: Uday Escobar  : 1955  MRN: [id number]  Referring provider: Anel Sapp MD  Dx:   Encounter Diagnosis     ICD-10-CM    1  Lymphedema  I89 0                   Subjective: Pt presents w/ concerns regarding her L axilla discomfort that persists but reports her L arm is actually feeling better  Objective: See treatment diary below       Assessment: Tolerated treatment well  Patient would benefit from continued PT  R UE edema managed well, maintaining RELAX garment after MLD, not necessary to bandage at this time, will RA and measure NV  Plan: Continue per plan of care           Precautions:       Manuals 8-5-21 8-9 8-11          MLD R UE jph jph jph          MLD L axilla + upper arm jph jph jph                       R UE compression jph jph jph          Neuro Re-Ed                                                                                                        Ther Ex                                                                                                                     Ther Activity                                       Gait Training                                       Modalities

## 2021-08-13 ENCOUNTER — OFFICE VISIT (OUTPATIENT)
Dept: PHYSICAL THERAPY | Facility: CLINIC | Age: 66
End: 2021-08-13
Payer: MEDICARE

## 2021-08-13 DIAGNOSIS — I89.0 LYMPHEDEMA: Primary | ICD-10-CM

## 2021-08-13 PROCEDURE — 97140 MANUAL THERAPY 1/> REGIONS: CPT | Performed by: PHYSICAL THERAPIST

## 2021-08-13 NOTE — PROGRESS NOTES
Daily Note     Today's date: 2021  Patient name: Francisca Chavez  : 1955  MRN: [id number]  Referring provider: Sheldon Cheatham MD  Dx:   Encounter Diagnosis     ICD-10-CM    1  Lymphedema  I89 0                   Subjective: Pt reports she is feeling less heaviness and L arm is better overall but still some irritation in axilla to pectoral region  Objective: See treatment diary below       Assessment: Tolerated treatment well  Patient would benefit from continued PT  Slight heaviness L axilla into triceps region, R arm seemingly managed well w/ good response to tx  Will RA and measure NV  Plan: Continue per plan of care           Precautions:       Manuals 8-5-21 8-9 8-11 8-13         MLD R UE jph jph jph jph         MLD L axilla + upper arm jph jph jph jph                      R UE compression jph jph jph jph         Neuro Re-Ed                                                                                                        Ther Ex                                                                                                                     Ther Activity                                       Gait Training                                       Modalities

## 2021-08-24 ENCOUNTER — OFFICE VISIT (OUTPATIENT)
Dept: PHYSICAL THERAPY | Facility: CLINIC | Age: 66
End: 2021-08-24
Payer: MEDICARE

## 2021-08-24 DIAGNOSIS — I89.0 LYMPHEDEMA: Primary | ICD-10-CM

## 2021-08-24 PROCEDURE — 97140 MANUAL THERAPY 1/> REGIONS: CPT | Performed by: PHYSICAL THERAPIST

## 2021-08-24 NOTE — PROGRESS NOTES
Daily Note     Today's date: 2021  Patient name: Solitario Ray  : 1955  MRN: [id number]  Referring provider: Jonathan Pitts MD  Dx:   Encounter Diagnosis     ICD-10-CM    1  Lymphedema  I89 0                   Subjective: Pt reports she still has some PN in L axilla but otherwise doing well, recent US was neg - per pt report  Pt feels her R upper arm is heavier however today despite less "jamming up" in R anterior chest region  Objective: See treatment diary below       Assessment: Tolerated treatment well  Patient would benefit from continued PT  RA NV, plan to tx thru next wk as held off on measuring today being that R upper arm seemed slightly more edematous today  Advised cont'd use of nighttime garment in conjunction w/ daytime  Plan: Continue per plan of care           Precautions:       Manuals 8--21 8-9 8-11 8-13 8-24        MLD R UE jph jph jph jph jph        MLD L axilla + upper arm jph jph jph jph jph                     R UE compression jph jph jph jph jph        Neuro Re-Ed                                                                                                        Ther Ex                                                                                                                     Ther Activity                                       Gait Training                                       Modalities

## 2021-08-31 ENCOUNTER — OFFICE VISIT (OUTPATIENT)
Dept: PHYSICAL THERAPY | Facility: CLINIC | Age: 66
End: 2021-08-31
Payer: MEDICARE

## 2021-08-31 DIAGNOSIS — I89.0 LYMPHEDEMA: Primary | ICD-10-CM

## 2021-08-31 PROCEDURE — 97140 MANUAL THERAPY 1/> REGIONS: CPT | Performed by: PHYSICAL THERAPIST

## 2021-08-31 NOTE — PROGRESS NOTES
Daily Note     Today's date: 2021  Patient name: Kathy Henao  : 1955  MRN: [id number]  Referring provider: Kimani Aburto MD  Dx:   Encounter Diagnosis     ICD-10-CM    1  Lymphedema  I89 0                   Subjective: Pt reports she feels her arms are doing better, however c/o discomfort in L periscapular region this day  Objective: See treatment diary below     Flowsheet Rows      Most Recent Value   Index Finger   R Index Finger Initial Girth  5 4 cm  V 5 3 cm = -0 1 cm  (L 5 3cm V 5 25 cm)     Palmar Crease   R Palmar Crease Initial Girth  17 75 cm  V 17 5 cm = -0 25 cm  (L 18 25 cm V 18 cm)   Wrist   R Wrist Initial Girth  15 cm  V  14 5 cm = -0 5 cm  (L 14 25 cm V 14 25 cm)   W+10cm   R W+10cm Initial Girth  20 5 cm  V 19 75 cm = -0 75  (L 20 cm V 20 cm)   W+20cm   R W+20cm Initial Girth  23 25 cm  V 22 75 cm = -0 5 cm  (L 22 5 cm V 22 25 cm)     W+30cm   R W+30cm Initial Girth  29 5 cm  V 27 25 cm = -2 25 cm  (L 27 cm V 26 75 cm)   Elbow Joint   R Elbow Joint Initial Girth  22 75 cm  V 21 75 cm = -1 cm  (L 22 cm V 22 cm)   W+40cm   R W+40cm Initial Girth  32 75 cm  V 32 cm = -0 75 cm  (L 29 cm V 28 75 cm)          Assessment: Tolerated treatment well  Patient would benefit from continued PT  Measurements this day indicate vol reduction B UE, advancing in POC  Will cont additional 2-3 wks then RA  Plan: Continue per plan of care           Precautions:       Manuals 8-- 8-9 8-11 8-13 8-24 8-31       MLD R UE jph jph jph jph jph jph       MLD L axilla + upper arm jph jph jph jph jph jph                    R UE compression jph jph jph jph jph jph       Neuro Re-Ed                                                                                                        Ther Ex                                                                                                                     Ther Activity                                       Gait Training Modalities

## 2021-09-03 ENCOUNTER — OFFICE VISIT (OUTPATIENT)
Dept: PHYSICAL THERAPY | Facility: CLINIC | Age: 66
End: 2021-09-03
Payer: MEDICARE

## 2021-09-03 DIAGNOSIS — I89.0 LYMPHEDEMA: Primary | ICD-10-CM

## 2021-09-03 PROCEDURE — 97140 MANUAL THERAPY 1/> REGIONS: CPT | Performed by: PHYSICAL THERAPIST

## 2021-09-03 NOTE — PROGRESS NOTES
Daily Note     Today's date: 9/3/2021  Patient name: Cristóbal Fernando  : 1955  MRN: [id number]  Referring provider: Maryse Andrew MD  Dx:   Encounter Diagnosis     ICD-10-CM    1  Lymphedema  I89 0                   Subjective: Pt reports she is utilizing her lidocaine patches on her L posterior shldr/upper back to address PN citing it takes the edge off and is helping  Overall feels improvement w/ less heaviness realized  Objective: See treatment diary below     Flowsheet Rows      Most Recent Value   Index Finger   R Index Finger Initial Girth  5 4 cm  V 5 3 cm = -0 1 cm  (L 5 3cm V 5 25 cm)     Palmar Crease   R Palmar Crease Initial Girth  17 75 cm  V 17 5 cm = -0 25 cm  (L 18 25 cm V 18 cm)   Wrist   R Wrist Initial Girth  15 cm  V  14 5 cm = -0 5 cm  (L 14 25 cm V 14 25 cm)   W+10cm   R W+10cm Initial Girth  20 5 cm  V 19 75 cm = -0 75  (L 20 cm V 20 cm)   W+20cm   R W+20cm Initial Girth  23 25 cm  V 22 75 cm = -0 5 cm  (L 22 5 cm V 22 25 cm)     W+30cm   R W+30cm Initial Girth  29 5 cm  V 27 25 cm = -2 25 cm  (L 27 cm V 26 75 cm)   Elbow Joint   R Elbow Joint Initial Girth  22 75 cm  V 21 75 cm = -1 cm  (L 22 cm V 22 cm)   W+40cm   R W+40cm Initial Girth  32 75 cm  V 32 cm = -0 75 cm  (L 29 cm V 28 75 cm)          Assessment: Tolerated treatment well  Patient would benefit from continued PT  Addressed soft tissue restriction L posterior shldr this day w/ hypersensitivity noted, mod PN response and moderate soft tissue restriction t/o periscapular region palpated  Advised pt to maintain self stretching and could use at home self massager  Plan: Continue per plan of care           Precautions:       Manuals 8-- 8-9 8-11 8-13 8-24 8-31 9-3      MLD R UE jph jph jph jph jph jph jph      MLD L axilla + upper arm jph jph jph jph jph jph jph      MFR L post shldr       jph      R UE compression mihaela mihaela mihaela fransisco moody jp mihaela      Neuro Re-Ed Ther Ex                                                                                                                     Ther Activity                                       Gait Training                                       Modalities

## 2021-09-07 ENCOUNTER — OFFICE VISIT (OUTPATIENT)
Dept: PHYSICAL THERAPY | Facility: CLINIC | Age: 66
End: 2021-09-07
Payer: MEDICARE

## 2021-09-07 DIAGNOSIS — I89.0 LYMPHEDEMA: Primary | ICD-10-CM

## 2021-09-07 PROCEDURE — 97140 MANUAL THERAPY 1/> REGIONS: CPT | Performed by: PHYSICAL THERAPIST

## 2021-09-07 NOTE — PROGRESS NOTES
Daily Note     Today's date: 2021  Patient name: Layne Chaudhari  : 1955  MRN: [id number]  Referring provider: Kelsey Hair MD  Dx:   Encounter Diagnosis     ICD-10-CM    1  Lymphedema  I89 0                   Subjective: Pt reports issues w/ PN "all over today" reporting "My whole body just hurts"  Rates PN scale 7/10, most severe LB into B hips but also discomfort in L upper arm despite it feeling better last wk  Objective: See treatment diary below       Assessment: Tolerated treatment well  Patient would benefit from continued PT  Pt seems to have a sense of hypersensitivity w/ a PN c/o to MLD in B upper arms  Overall heaviness reduced and goo general control of lymphedema sx's exhibited  Plan: Continue per plan of care           Precautions:       Manuals 8-5-21 8-9 8-11 8-13 8-24 8-31 9-3 9-7     MLD R UE jph jph jph jph jph jph jph jph     MLD L axilla + upper arm jph jph jph jph jph jph jph jph     MFR L post shldr       jph jph     R UE compression jph jph jph jph jph jph jph jph     Neuro Re-Ed                                                                                                        Ther Ex                                                                                                                     Ther Activity                                       Gait Training                                       Modalities

## 2021-09-08 ENCOUNTER — TELEPHONE (OUTPATIENT)
Dept: URGENT CARE | Facility: CLINIC | Age: 66
End: 2021-09-08

## 2021-09-08 ENCOUNTER — OFFICE VISIT (OUTPATIENT)
Dept: URGENT CARE | Facility: CLINIC | Age: 66
End: 2021-09-08
Payer: MEDICARE

## 2021-09-08 ENCOUNTER — APPOINTMENT (OUTPATIENT)
Dept: RADIOLOGY | Facility: CLINIC | Age: 66
End: 2021-09-08
Payer: MEDICARE

## 2021-09-08 VITALS
TEMPERATURE: 98.7 F | OXYGEN SATURATION: 98 % | SYSTOLIC BLOOD PRESSURE: 167 MMHG | HEART RATE: 74 BPM | DIASTOLIC BLOOD PRESSURE: 74 MMHG | RESPIRATION RATE: 18 BRPM

## 2021-09-08 DIAGNOSIS — M25.512 LEFT SHOULDER PAIN, UNSPECIFIED CHRONICITY: Primary | ICD-10-CM

## 2021-09-08 DIAGNOSIS — M25.512 LEFT SHOULDER PAIN, UNSPECIFIED CHRONICITY: ICD-10-CM

## 2021-09-08 DIAGNOSIS — C50.919 MALIGNANT NEOPLASM OF FEMALE BREAST, UNSPECIFIED ESTROGEN RECEPTOR STATUS, UNSPECIFIED LATERALITY, UNSPECIFIED SITE OF BREAST (HCC): ICD-10-CM

## 2021-09-08 PROCEDURE — 73030 X-RAY EXAM OF SHOULDER: CPT

## 2021-09-08 PROCEDURE — 99213 OFFICE O/P EST LOW 20 MIN: CPT | Performed by: PHYSICIAN ASSISTANT

## 2021-09-08 PROCEDURE — G0463 HOSPITAL OUTPT CLINIC VISIT: HCPCS | Performed by: PHYSICIAN ASSISTANT

## 2021-09-08 RX ORDER — SERTRALINE HYDROCHLORIDE 100 MG/1
100 TABLET, FILM COATED ORAL DAILY
COMMUNITY
Start: 2021-06-30

## 2021-09-08 RX ORDER — ONDANSETRON 4 MG/1
4 TABLET, ORALLY DISINTEGRATING ORAL
COMMUNITY

## 2021-09-08 RX ORDER — ALPRAZOLAM 0.25 MG/1
TABLET ORAL
COMMUNITY
Start: 2021-05-13

## 2021-09-08 RX ORDER — OXYCODONE HYDROCHLORIDE AND ACETAMINOPHEN 5; 325 MG/1; MG/1
TABLET ORAL
COMMUNITY
Start: 2021-07-19

## 2021-09-08 RX ORDER — CHLORAL HYDRATE 500 MG
2 CAPSULE ORAL DAILY
COMMUNITY

## 2021-09-08 RX ORDER — METHOCARBAMOL 500 MG/1
TABLET, FILM COATED ORAL
COMMUNITY

## 2021-09-08 RX ORDER — CYCLOSPORINE 0.5 MG/ML
EMULSION OPHTHALMIC
COMMUNITY
Start: 2021-08-17

## 2021-09-08 RX ORDER — MELOXICAM 15 MG/1
15 TABLET ORAL DAILY
Qty: 30 TABLET | Refills: 0 | Status: SHIPPED | OUTPATIENT
Start: 2021-09-08

## 2021-09-08 RX ORDER — MELOXICAM 15 MG/1
TABLET ORAL
COMMUNITY

## 2021-09-08 RX ORDER — CEPHALEXIN 500 MG/1
CAPSULE ORAL
COMMUNITY
Start: 2021-07-29

## 2021-09-08 RX ORDER — LIDOCAINE 50 MG/G
1 PATCH TOPICAL EVERY 24 HOURS
COMMUNITY

## 2021-09-08 RX ORDER — LEVOTHYROXINE SODIUM 0.05 MG/1
50 TABLET ORAL DAILY
COMMUNITY
Start: 2021-06-07

## 2021-09-08 NOTE — TELEPHONE ENCOUNTER
Inform patient that radiologist did not see any acute bony changes or abnormal lesions of shoulder, scapula of concern  There are arthritic changes noted  Patient states she is going to call her treating providers in make follow-up

## 2021-09-08 NOTE — PROGRESS NOTES
[facility]    NAME: Benson Eric is a 78 y o  female  : 1955    MRN: [id number]  DATE: 2021  TIME: 10:12 AM    Assessment and Plan   Left shoulder pain, unspecified chronicity [M25 512]  1  Left shoulder pain, unspecified chronicity  XR shoulder 2+ vw left    meloxicam (MOBIC) 15 mg tablet   2  Malignant neoplasm of female breast, unspecified estrogen receptor status, unspecified laterality, unspecified site of breast (HCC)  meloxicam (MOBIC) 15 mg tablet     X-ray left shoulder:  No acute bony changes noted  Await radiologist's final test results  Patient Instructions   Patient Instructions     If worsening pain proceed to emergency room for further evaluation  Prescription for meloxicam sent to pharmacy  Take as instructed  Call your primary treating providers today to arrange follow-up for as soon as possible  Chief Complaint     Chief Complaint   Patient presents with    Generalized body pain     Patient was at [facility] and they told her to come over  She is having "acute" body pain  for a few days       History of Present Illness   Benson Eric presents to the clinic c/o   79-year-old female was over at therapy this morning complaining of terrible pain in her body especially her left shoulder and was told to come over to our office for further evaluation  She has stage III breast cancer  Has not seen her oncologist for about a year  She is on narcotics for pain control  She has chronic lymphedema and is getting lymphedema treatment next door in therapy  She says the pain has been worsening over the last couple months but in the last couple days it significantly worsening  She does not have a primary care provider around here  Review of Systems   Review of Systems   Constitutional: Positive for activity change, appetite change and fatigue  Respiratory: Negative  Cardiovascular: Negative      Musculoskeletal: Positive for arthralgias and myalgias  Negative for back pain  Current Medications     Long-Term Medications   Medication Sig Dispense Refill    ALPRAZolam (XANAX) 0 25 mg tablet       carbamide peroxide (DEBROX) 6 5 % otic solution Administer 5 drops into the left ear 2 (two) times a day 15 mL 0    Cholecalciferol 25 MCG (1000 UT) tablet Take by mouth      levothyroxine 50 mcg tablet Take 50 mcg by mouth daily      lidocaine (LIDODERM) 5 % Place 1 patch on the skin every 24 hours      meloxicam (MOBIC) 15 mg tablet Take by mouth      methocarbamol (ROBAXIN) 500 mg tablet Take by mouth      Omega-3 Fatty Acids (fish oil) 1,000 mg Take 2 g by mouth daily      ondansetron (ZOFRAN-ODT) 4 mg disintegrating tablet Take 4 mg by mouth      Restasis 0 05 % ophthalmic emulsion INSTILL 1 DROP IN BOTH EYES TWICE DAILY      sertraline (ZOLOFT) 100 mg tablet Take 100 mg by mouth daily      meloxicam (MOBIC) 15 mg tablet Take 1 tablet (15 mg total) by mouth daily 30 tablet 0       Current Allergies     Allergies as of 09/08/2021 - Reviewed 09/08/2021   Allergen Reaction Noted    Fentanyl Shortness Of Breath 05/21/2014          The following portions of the patient's history were reviewed and updated as appropriate: allergies, current medications, past family history, past medical history, past social history, past surgical history and problem list   Past Medical History:   Diagnosis Date    Cancer [facility])      History reviewed  No pertinent surgical history  History reviewed  No pertinent family history  Objective   /74   Pulse 74   Temp 98 7 °F (37 1 °C) (Tympanic)   Resp 18   SpO2 98%   No LMP recorded  Patient is postmenopausal        Physical Exam     Physical Exam  Vitals and nursing note reviewed  Constitutional:       General: She is not in acute distress  Appearance: Normal appearance  She is well-developed  She is not ill-appearing, toxic-appearing or diaphoretic     Cardiovascular:      Rate and Rhythm: Normal rate       Pulses: Normal pulses  Heart sounds: Normal heart sounds  No murmur heard  No friction rub  No gallop  Pulmonary:      Effort: Pulmonary effort is normal  No respiratory distress  Breath sounds: Normal breath sounds  No stridor  No wheezing, rhonchi or rales  Musculoskeletal:         General: Swelling present  Comments: Lymphatic swelling right arm  Pain right shoulder and scapular area  Skin:     General: Skin is warm and dry  Neurological:      Mental Status: She is alert and oriented to person, place, and time     Psychiatric:         Mood and Affect: Mood normal          Behavior: Behavior normal

## 2021-09-08 NOTE — PATIENT INSTRUCTIONS
If worsening pain proceed to emergency room for further evaluation  Prescription for meloxicam sent to pharmacy  Take as instructed  Call your primary treating providers today to arrange follow-up for as soon as possible

## 2021-09-09 ENCOUNTER — OFFICE VISIT (OUTPATIENT)
Dept: PHYSICAL THERAPY | Facility: CLINIC | Age: 66
End: 2021-09-09
Payer: MEDICARE

## 2021-09-09 DIAGNOSIS — I89.0 LYMPHEDEMA: Primary | ICD-10-CM

## 2021-09-09 PROCEDURE — 97140 MANUAL THERAPY 1/> REGIONS: CPT | Performed by: PHYSICAL THERAPIST

## 2021-09-09 NOTE — PROGRESS NOTES
Daily Note     Today's date: 2021  Patient name: Richard Mas  : 1955  MRN: [id number]  Referring provider: Chase Newman MD  Dx:   Encounter Diagnosis     ICD-10-CM    1  Lymphedema  I89 0                   Subjective: Pt reports seeing her PCP this AM and urgent care yesterday due to L shldr PN worsened  Pt reports she was anxious regarding PN, her higher BP, and uncertainty regarding the source of her PN  Expresses concern over her 02 sats being taken on her lymphedema R arm  Objective: See treatment diary below       Assessment: Tolerated treatment well  Patient would benefit from continued PT  Good melvina to tx this day w/ less sig PN c/o  Discussed precautions w/ BP on affected arm but advised to adverse affect to O2 sat readings  Also reviewed L shldr orthopeadic concerns and advised Meloxicam Rx thru the weekend, will RA NV  Plan: Continue per plan of care           Precautions:       Manuals 8-5-21 8-9 8-11 8-13 8-24 8-31 9-3 9-7 9-9    MLD R UE jph jph jph jph jph jph jph jph jph    MLD L axilla + upper arm jph jph UOD QTV DMD DIY HRW BLS jph    MFR L post shldr       jph jph jph    R UE compression jph jph jph jph jph jph jph jph jph    Neuro Re-Ed                                                                                                        Ther Ex                                                                                                                     Ther Activity                                       Gait Training                                       Modalities

## 2021-09-13 ENCOUNTER — OFFICE VISIT (OUTPATIENT)
Dept: PHYSICAL THERAPY | Facility: CLINIC | Age: 66
End: 2021-09-13
Payer: MEDICARE

## 2021-09-13 DIAGNOSIS — I89.0 LYMPHEDEMA: Primary | ICD-10-CM

## 2021-09-13 PROCEDURE — 97140 MANUAL THERAPY 1/> REGIONS: CPT | Performed by: PHYSICAL THERAPIST

## 2021-09-13 NOTE — PROGRESS NOTES
Daily Note     Today's date: 2021  Patient name: Kathy Henao  : 1955  MRN: [id number]  Referring provider: Kimani Aburto MD  Dx:   Encounter Diagnosis     ICD-10-CM    1  Lymphedema  I89 0                   Subjective: Pt reports 5/10 PN in L shldr today, better w/ taking Mobic OTW  Objective: See treatment diary below       Assessment: Tolerated treatment well  Patient would benefit from continued PT   PROM L shldr flexion yields mild PN response, guarded at end range OH, provided mild GH distraction w/ good response, will cont to monitor and consider further ortho assessment NV prn  Lymphedema sx's cont to present well managed and will consider d/c from lymph phase 1 CDT tx within NV pending measurements  Plan: Continue per plan of care           Precautions:       Manuals 8-5-21 8-9 8-11 8-13 8-24 8-31 9-3 9-7 9-9 9-13   MLD R UE jph jph jph jph jph jph jph jph jph jph   MLD L axilla + upper arm jph jph jph jph jph jph jph [de-identified] jph jph   MFR L post shldr       jph jph jph jph   R UE compression jph jph jph jph jph jph jph jph jph jph   Neuro Re-Ed                                                                                                        Ther Ex                                                                                                                     Ther Activity                                       Gait Training                                       Modalities

## 2021-09-17 ENCOUNTER — OFFICE VISIT (OUTPATIENT)
Dept: PHYSICAL THERAPY | Facility: CLINIC | Age: 66
End: 2021-09-17
Payer: MEDICARE

## 2021-09-17 DIAGNOSIS — I89.0 LYMPHEDEMA: Primary | ICD-10-CM

## 2021-09-17 PROCEDURE — 97140 MANUAL THERAPY 1/> REGIONS: CPT | Performed by: PHYSICAL THERAPIST

## 2021-09-17 NOTE — PROGRESS NOTES
Daily Note + PT Re-Evaluation     Today's date: 2021  Patient name: Irish oLpez  : 1955  MRN: [id number]  Referring provider: Cherylene Malling, MD  Dx:   Encounter Diagnosis     ICD-10-CM    1  Lymphedema  I89 0                   Subjective: Pt reports L shldr PN reduced to a 4/10, still an irritation along medial aspect of upper arm, elbow to axilla w/ sx's that vary and are aided in reduction w/ recent anti-inflammatory Rx  Pt reports R UE is doing well and feels lymphedema is reduced  Maintains R UE garment  Goals  ST  Pt to achieve girth reduction R upper arm within 3 wks-MET  2  Pt to maintain daily and nighttime garments within 1 wk-MET  LT  Fitted for new daytime compression garments within 4 wks-Not met  2  Achieve R UE reduced girth measurements thru upper arm >10% within 5 wks-Partially met    Plan  Patient would benefit from: skilled PT  Planned therapy interventions: manual therapy, massage, patient education, home exercise program and compression  Frequency: 2x week  Duration in visits: 10  Duration in weeks: 5  Plan of Care beginning date: 2021  Plan of Care expiration date: 10/22/2021  Treatment plan discussed with: patient        Subjective Evaluation    History of Present Illness  Mechanism of injury: Presents today for f/u regarding ongoing chronic R UE lymphedema and concerns for recent increase in heaviness of L axilla  Pt reports she feels she is in need of new garments, has been distracted from her normal self care routine after caring for her mother this past yr leading up to her passing     Quality of life: poor    Pain  Current pain ratin  At best pain ratin  At worst pain ratin  Location: R UE + Thumb  Quality: dull ache, discomfort, squeezing, tight and pressure  Aggravating factors: nothing  Progression: worsening      Diagnostic Tests  No diagnostic tests performed  Treatments  Previous treatment: physical therapy  Patient Goals  Patient goals for therapy: independence with ADLs/IADLs, return to sport/leisure activities, decreased pain and decreased edema  Patient goal: Reduce R UE edema        Palpation     Moderate ttp L anterior aspect of shldr, bicipital groove, mild ttp supraspinatus insertion  Active Range of Motion     Additional Active Range of Motion Details  L shldr flexion 174 deg   L shldr abd 172 deg  L Shldr - cross over, + [employer]    Muscle Activation   L shldr strength 4/5 t/o  Mild guarding into flexion OH and abd    General Comments: Ankle/Foot Comments   Physical assessment:  Palpation: Minimal heaviness  Skin Mobility: Minimal restriction and fibrosis R upper arm  Skin color: WNL  Pitting: Minimal  Wound presence: none  Wound size: n/a  Wound color: n/a  Stemmer's Sign: +  Gait assessment: WNL, no AD  Transfer status:  WNL  Ability to don/doff clothing/garments: I        Flowsheet Rows      Most Recent Value   Index Finger   R Index Finger Initial Girth  5 4 cm  V 5 5 cm RA (5 5cm) = +0 cm  (L 5 3cm) 5 1cm     Palmar Crease   R Palmar Crease Initial Girth  17 75 cm  V 17 5 cm (17 5) cm = 0 cm  (L 18 25 cm) 18 25cm   Wrist   R Wrist Initial Girth  15 cm  V  14 75 cm (14 5)  = -0 25 cm  (L 14 25 cm) 14 25 cm   W+10cm   R W+10cm Initial Girth  20 5 cm  V 20 5 cm (20 25) = -0 25 cm  (L 20 cm) 20 cm   W+20cm   R W+20cm Initial Girth  23 25 cm  V 22 75 cm (22 5) = -0 25 cm  (L 22 5 cm) 22 25 cm   W+30cm   R W+30cm Initial Girth  29 5 cm  V 28 25 cm (27 75) = -0 5 cm  (L 27 cm) 26 75 cm   Elbow Joint   R Elbow Joint Initial Girth  22 75 cm  V 21 75 cm (22) = +0 25 cm  (L 22 cm) 22 cm   W+40cm   R W+40cm Initial Girth  32 75 cm  V 33 5 cm (32 25) = -1 25 cm  (L 29 cm) 28 75 cm            Assessment: Tolerated treatment well   Patient would benefit from continued PT  Measurements this day yield further reduction achieved w/ good management of R UE lymphedema and overall reduction of L shldr sx's and no increase of edema or sig concern for lymphedema in that arm  Pt does however continue to experience PN in L upper arm to shldr region w/ mild ROM and strength deficits but good functional use of L UE  Question L shldr impingement vs Hx of cervical radiculopathy secondary to DDD c/s  Pt to be measured for new compression garments R UE NV, Sept 29th, then anticipated d/c skilled PT to phase 2 CDT protocol - self management               Precautions:       Manuals 9-17         9-13   MLD R UE jph         jph   MLD L axilla + upper arm jph         jph   MFR L post shldr jph         jph   R UE compression jph         jph   Neuro Re-Ed                                                                                                        Ther Ex                                                                                                                     Ther Activity                                       Gait Training                                       Modalities

## 2021-09-17 NOTE — LETTER
2021    Yusef Mykel, [address] [facility] [address] [place] [address]    Patient: Francisca Chavez   YOB: 1955   Date of Visit: 2021     Encounter Diagnosis     ICD-10-CM    1  Lymphedema  I89 0        Dear Dr Yuri Mckeon: Thank you for your recent referral of Francisca Chavez  Please review the attached evaluation summary from Shonna's recent visit  Please verify that you agree with the plan of care by signing the attached order  If you have any questions or concerns, please do not hesitate to call  I sincerely appreciate the opportunity to share in the care of one of your patients and hope to have another opportunity to work with you in the near future  Sincerely,    Alex Arboleda, PT      Referring Provider:      I certify that I have read the below Plan of Care and certify the need for these services furnished under this plan of treatment while under my care  Yusef Mykel, [address] [facility] [address]  [place]  [place] [address]  Via Fax: [phone]          Daily Note + PT Re-Evaluation     Today's date: 2021  Patient name: Francisca Chavez  : 1955  MRN: [id number]  Referring provider: Sheldon Cheatham MD  Dx:   Encounter Diagnosis     ICD-10-CM    1  Lymphedema  I89 0                   Subjective: Pt reports L shldr PN reduced to a 4/10, still an irritation along medial aspect of upper arm, elbow to axilla w/ sx's that vary and are aided in reduction w/ recent anti-inflammatory Rx  Pt reports R UE is doing well and feels lymphedema is reduced  Maintains R UE garment  Goals  ST  Pt to achieve girth reduction R upper arm within 3 wks-MET  2  Pt to maintain daily and nighttime garments within 1 wk-MET  LT  Fitted for new daytime compression garments within 4 wks-Not met  2   Achieve R UE reduced girth measurements thru upper arm >10% within 5 wks-Partially met    Plan  Patient would benefit from: skilled PT  Planned Patient transferred to room 3015 via cart, baby in mom's arms. Bands read and verified, report given to LOGAN Hobson. Code alert # 36.    therapy interventions: manual therapy, massage, patient education, home exercise program and compression  Frequency: 2x week  Duration in visits: 10  Duration in weeks: 5  Plan of Care beginning date: 2021  Plan of Care expiration date: 10/22/2021  Treatment plan discussed with: patient        Subjective Evaluation    History of Present Illness  Mechanism of injury: Presents today for f/u regarding ongoing chronic R UE lymphedema and concerns for recent increase in heaviness of L axilla  Pt reports she feels she is in need of new garments, has been distracted from her normal self care routine after caring for her mother this past yr leading up to her passing  Quality of life: poor    Pain  Current pain ratin  At best pain ratin  At worst pain ratin  Location: R UE + Thumb  Quality: dull ache, discomfort, squeezing, tight and pressure  Aggravating factors: nothing  Progression: worsening      Diagnostic Tests  No diagnostic tests performed  Treatments  Previous treatment: physical therapy  Patient Goals  Patient goals for therapy: independence with ADLs/IADLs, return to sport/leisure activities, decreased pain and decreased edema  Patient goal: Reduce R UE edema        Palpation     Moderate ttp L anterior aspect of shldr, bicipital groove, mild ttp supraspinatus insertion  Active Range of Motion     Additional Active Range of Motion Details  L shldr flexion 174 deg   L shldr abd 172 deg  L Shldr - cross over, + Marsh [employer] Ayala    Muscle Activation   L shldr strength 4/5 t/o  Mild guarding into flexion OH and abd    General Comments:       Ankle/Foot Comments   Physical assessment:  Palpation: Minimal heaviness  Skin Mobility: Minimal restriction and fibrosis R upper arm  Skin color: WNL  Pitting: Minimal  Wound presence: none  Wound size: n/a  Wound color: n/a  Stemmer's Sign: +  Gait assessment: WNL, no AD  Transfer status:  WNL  Ability to don/doff clothing/garments: I        Flowsheet Rows Most Recent Value   Index Finger   R Index Finger Initial Girth  5 4 cm  V 5 5 cm RA (5 5cm) = +0 cm  (L 5 3cm) 5 1cm     Palmar Crease   R Palmar Crease Initial Girth  17 75 cm  V 17 5 cm (17 5) cm = 0 cm  (L 18 25 cm) 18 25cm   Wrist   R Wrist Initial Girth  15 cm  V  14 75 cm (14 5)  = -0 25 cm  (L 14 25 cm) 14 25 cm   W+10cm   R W+10cm Initial Girth  20 5 cm  V 20 5 cm (20 25) = -0 25 cm  (L 20 cm) 20 cm   W+20cm   R W+20cm Initial Girth  23 25 cm  V 22 75 cm (22 5) = -0 25 cm  (L 22 5 cm) 22 25 cm   W+30cm   R W+30cm Initial Girth  29 5 cm  V 28 25 cm (27 75) = -0 5 cm  (L 27 cm) 26 75 cm   Elbow Joint   R Elbow Joint Initial Girth  22 75 cm  V 21 75 cm (22) = +0 25 cm  (L 22 cm) 22 cm   W+40cm   R W+40cm Initial Girth  32 75 cm  V 33 5 cm (32 25) = -1 25 cm  (L 29 cm) 28 75 cm            Assessment: Tolerated treatment well  Patient would benefit from continued PT  Measurements this day yield further reduction achieved w/ good management of R UE lymphedema and overall reduction of L shldr sx's and no increase of edema or sig concern for lymphedema in that arm  Pt does however continue to experience PN in L upper arm to shldr region w/ mild ROM and strength deficits but good functional use of L UE  Question L shldr impingement vs Hx of cervical radiculopathy secondary to DDD c/s  Pt to be measured for new compression garments R UE NV, Sept 29th, then anticipated d/c skilled PT to phase 2 CDT protocol - self management               Precautions:       Manuals 9-17         9-13   MLD R UE jph         jph   MLD L axilla + upper arm jph         jph   MFR L post shldr jph         jph   R UE compression jph         jph   Neuro Re-Ed                                                                                                        Ther Ex                                                                                                                     Ther Activity                                       Gait Training                                       Modalities

## 2021-09-29 ENCOUNTER — OFFICE VISIT (OUTPATIENT)
Dept: PHYSICAL THERAPY | Facility: CLINIC | Age: 66
End: 2021-09-29
Payer: MEDICARE

## 2021-09-29 DIAGNOSIS — I89.0 LYMPHEDEMA: Primary | ICD-10-CM

## 2021-09-29 PROCEDURE — 97140 MANUAL THERAPY 1/> REGIONS: CPT | Performed by: PHYSICAL THERAPIST

## 2021-09-29 NOTE — PROGRESS NOTES
Daily Note + PT Discharge    Today's date: 2021  Patient name: Layne Chaudhari  : 1955  MRN: [id number]  Referring provider: Kelsey Hair MD  Dx:   Encounter Diagnosis     ICD-10-CM    1  Lymphedema  I89 0                   Subjective: Pt seen this day for measurements of new garments, Juzo compression sleeve and glove, Jobst relax nighttime garment  Objective: See treatment diary below      Assessment: Tolerated treatment well  Patient fitted this day, refer to measurements scanned into media, provided to pt thru hard copy and email, pt to file w/ Shaaron Fillers for order  Now d/c skilled PT         Plan: D/c skilled PT        Precautions:       Manuals  9        9-13   MLD R UE jph garment fit        jph   MLD L axilla + upper arm jph         jph   MFR L post shldr jph         jph   R UE compression jph jph        jph   Neuro Re-Ed                                                                                                        Ther Ex                                                                                                                     Ther Activity                                       Gait Training                                       Modalities

## 2022-08-10 ENCOUNTER — EVALUATION (OUTPATIENT)
Dept: PHYSICAL THERAPY | Facility: CLINIC | Age: 67
End: 2022-08-10
Payer: MEDICARE

## 2022-08-10 DIAGNOSIS — G89.29 CHRONIC LEFT SHOULDER PAIN: ICD-10-CM

## 2022-08-10 DIAGNOSIS — M25.512 CHRONIC LEFT SHOULDER PAIN: ICD-10-CM

## 2022-08-10 DIAGNOSIS — I89.0 LYMPHEDEMA: Primary | ICD-10-CM

## 2022-08-10 PROCEDURE — 97163 PT EVAL HIGH COMPLEX 45 MIN: CPT | Performed by: PHYSICAL THERAPIST

## 2022-08-10 NOTE — LETTER
August 10, 2022    Wanda Hale, [address] [place] [address] [address] [place] [address]    Patient: Susy Pacheco   YOB: 1955   Date of Visit: 8/10/2022     Encounter Diagnosis     ICD-10-CM    1  Lymphedema  I89 0    2  Chronic left shoulder pain  M25 512     G89 29        Dear Dr Beau Aguilar: Thank you for your recent referral of Susy Pacheco  Please review the attached evaluation summary from Shonna's recent visit  Please verify that you agree with the plan of care by signing the attached order  If you have any questions or concerns, please do not hesitate to call  I sincerely appreciate the opportunity to share in the care of one of your patients and hope to have another opportunity to work with you in the near future  Sincerely,    Chanelle Bernal, PT      Referring Provider:      I certify that I have read the below Plan of Care and certify the need for these services furnished under this plan of treatment while under my care  [facility] [place]  [place] [address]  Via Fax: [phone]           PT Evaluation     Today's date: 8/10/2022  Patient name: Susy Pacheco  : 1955  MRN: [id number]  Referring provider: Petr Burns MD  Dx:   Encounter Diagnosis     ICD-10-CM    1  Lymphedema  I89 0    2   Chronic left shoulder pain  M25 512     G89 29                  Subjective Evaluation      Pain  Current pain ratin  At best pain ratin  At worst pain ratin  Location: L axilla   Quality: dull ache, discomfort, squeezing, tight and pressure  Aggravating factors: reaching, overuse  Progression: worsening      Diagnostic Tests  X-ray + for mild OA L AC jt  Treatments  Previous treatment: physical therapy for lymphedema management  Patient Goals  Patient goals for therapy: independence with ADLs/IADLs, return to sport/leisure activities, decreased pain and decreased edema  Patient goal: Reduce R UE edema, address L UE PN       History of Present Illness    Subjective: Pt presents for examination stating her R arm has been pretty good, well managed w/ use of garments but L arm feels swollen and she feels her lymph nodes are enlarged in her L axilla, citing a PN in L axilla for >1 5 yrs 7/10 PN  Pt reports she had this issue prior to her covid vaccine but she did have that and x2 boosters  Pt has had mammogram in  in [place], neg, also US L breast/axilla w/ indication for enlarged L axillary lymph node but no malignancy suggested  Pt admits a PN from medial aspect of elbow to axilla, also PN in superior aspect of L scapula  Pt does admit use of PN patches, also Rx of morphine to manage chronic PN sx's  Goals  ST  Pt to achieve girth reduction R upper arm within 3 wks  2  Pt to maintain daily and nighttime garments within 1 wk  3  Reduce PN L UE <3/10 2/ all activity within 3 wks  LT  Fitted for new daytime compression garments within 6 wks  2  Achieve R UE reduced girth measurements thru upper arm >10% within 5 wks  3  Reduce PN <1/10 L UE within 6 wks  4  Improve postural strength 5/5 t/o within 6 wks      Objective:    Palpation   Moderate ttp L anterior aspect of shldr, bicipital groove, to medial epicondyle of elbow  PN pattern presents in similar pattern (from anterior L chest to medial elbow) as commonly seen in cording/MITCH  Moderate ttp L superior pole and border of scapula, medial aspect of scapula yields mild to mod ttp within soft tissue structures w/ moderate soft tissue restriction    Active Range of Motion     Additional Active Range of Motion Details  L shldr flexion 174 deg   L shldr abd 172 deg  L Shldr - cross over, + Mykel & Ayala for mild PN/guarding    Muscle Activation   L shldr strength 4/5 t/o  Mild guarding into flexion OH and abd w/ posturing fair to poor, Mod B rounded shldrs and fwd head  General Comments:     Ankle/Foot Comments   Physical assessment:  Palpation: Minimal heaviness  Skin Mobility: Minimal restriction and fibrosis R upper arm  Skin color: WNL  Pitting: Minimal  Wound presence: none  Wound size: n/a  Wound color: n/a  Stemmer's Sign: +  Gait assessment: WNL, no AD  Transfer status:  WNL  Ability to don/doff clothing/garments: I     Assessment:  Pt demonstrates mild increase in R UE edema, overall fairly well managed but heaviness noted most thru upper arm, deltoid region into axilla, would benefit from CDT including MLD and compression  Also noted is L axilla ttp and restriction from anterior chest to medial aspect of elbow w/ sharp PN to palpation, also general tightness which follows a similar pattern to MITCH/cording - will treat accordingly  Mild ROM deficits and moderate postural deficits are noted w/ significant soft tissue tension thru posterior shldrs and L periscapular musculature w/ ttp along superior and medial scap borders for which skilled PT is also advised including soft tissue MS/MFR and scap mobs + postural education and strengthening  Pending response to skilled PT, PN mngmt may be warranted       Plan  Patient would benefit from: skilled PT  Planned therapy interventions: manual therapy, massage, patient education, home exercise program and compression  Frequency: 2x week  Duration in visits: 10  Duration in weeks: 6  Plan of Care beginning date: 8/10/2022  Plan of Care expiration date: 9/22/2022  Treatment plan discussed with: patient       Flowsheet Rows      Most Recent Value   Index Finger                                                                                                                                                            8-10-22 Update   R Index Finger Initial Girth  5 4 cm  V 5 5 cm RA (5 5cm) = +0 cm  (L 5 3cm) 5 1cm                                 5 4 cm     Palmar Crease   R Palmar Crease Initial Girth  17 75 cm  V 17 5 cm (17 5) cm = 0 cm  (L 18 25 cm) 18 25cm                       17 4 cm   Wrist   R Wrist Initial Girth 15 cm  V  14 75 cm (14 5)  = -0 25 cm  (L 14 25 cm) 14 25 cm                      14 75 cm   W+10cm   R W+10cm Initial Girth  20 5 cm  V 20 5 cm (20 25) = -0 25 cm  (L 20 cm) 20 cm                               21 1 cm   W+20cm   R W+20cm Initial Girth  23 25 cm  V 22 75 cm (22 5) = -0 25 cm  (L 22 5 cm) 22 25 cm                     23 5 cm   W+30cm   R W+30cm Initial Girth  29 5 cm  V 28 25 cm (27 75) = -0 5 cm  (L 27 cm) 26 75 cm                          28 5 cm   Elbow Joint   R Elbow Joint Initial Girth  22 75 cm  V 21 75 cm (22) = +0 25 cm  (L 22 cm) 22 cm                               22 5 cm   W+40cm   R W+40cm Initial Girth  32 75 cm  V 33 5 cm (32 25) = -1 25 cm  (L 29 cm) 28 75 cm                        33 cm            Assessment: Tolerated treatment well  Patient would benefit from continued PT  Measurements this day yield further reduction achieved w/ good management of R UE lymphedema and overall reduction of L shldr sx's and no increase of edema or sig concern for lymphedema in that arm  Pt does however continue to experience PN in L upper arm to shldr region w/ mild ROM and strength deficits but good functional use of L UE  Question L shldr impingement vs Hx of cervical radiculopathy secondary to DDD c/s  Pt to be measured for new compression garments R UE NV, Sept 29th, then anticipated d/c skilled PT to phase 2 CDT protocol - self management               Precautions:       Manuals             MLD R UE             MLD L axilla + upper arm             MFR L post shldr             R UE compression             Neuro Re-Ed                                                                                                        Ther Ex                                                                                                                     Ther Activity                                       Gait Training                                       Modalities

## 2022-08-10 NOTE — PROGRESS NOTES
PT Evaluation     Today's date: 8/10/2022  Patient name: Jannette Stark  : 1955  MRN: [id number]  Referring provider: Lina Perez MD  Dx:   Encounter Diagnosis     ICD-10-CM    1  Lymphedema  I89 0    2  Chronic left shoulder pain  M25 512     G89 29                  Subjective Evaluation      Pain  Current pain ratin  At best pain ratin  At worst pain ratin  Location: L axilla   Quality: dull ache, discomfort, squeezing, tight and pressure  Aggravating factors: reaching, overuse  Progression: worsening      Diagnostic Tests  X-ray + for mild OA L AC jt  Treatments  Previous treatment: physical therapy for lymphedema management  Patient Goals  Patient goals for therapy: independence with ADLs/IADLs, return to sport/leisure activities, decreased pain and decreased edema  Patient goal: Reduce R UE edema, address L UE PN       History of Present Illness    Subjective: Pt presents for examination stating her R arm has been pretty good, well managed w/ use of garments but L arm feels swollen and she feels her lymph nodes are enlarged in her L axilla, citing a PN in L axilla for >1 5 yrs 7/10 PN  Pt reports she had this issue prior to her covid vaccine but she did have that and x2 boosters  Pt has had mammogram in  in [place], [facility], also US L breast/axilla w/ indication for enlarged L axillary lymph node but no malignancy suggested  Pt admits a PN from medial aspect of elbow to axilla, also PN in superior aspect of L scapula  Pt does admit use of PN patches, also Rx of morphine to manage chronic PN sx's  Goals  ST  Pt to achieve girth reduction R upper arm within 3 wks  2  Pt to maintain daily and nighttime garments within 1 wk  3  Reduce PN L UE <3/10 2/ all activity within 3 wks  LT  Fitted for new daytime compression garments within 6 wks  2  Achieve R UE reduced girth measurements thru upper arm >10% within 5 wks  3  Reduce PN <1/10 L UE within 6 wks  4   Improve postural strength 5/5 t/o within 6 wks      Objective:    Palpation   Moderate ttp L anterior aspect of shldr, bicipital groove, to medial epicondyle of elbow  PN pattern presents in similar pattern (from anterior L chest to medial elbow) as commonly seen in cording/MITCH  Moderate ttp L superior pole and border of scapula, medial aspect of scapula yields mild to mod ttp within soft tissue structures w/ moderate soft tissue restriction    Active Range of Motion     Additional Active Range of Motion Details  L shldr flexion 174 deg   L shldr abd 172 deg  L Shldr - cross over, + Mykel & Ayala for mild PN/guarding    Muscle Activation   L shldr strength 4/5 t/o  Mild guarding into flexion OH and abd w/ posturing fair to poor, Mod B rounded shldrs and fwd head  General Comments: Ankle/Foot Comments   Physical assessment:  Palpation: Minimal heaviness  Skin Mobility: Minimal restriction and fibrosis R upper arm  Skin color: WNL  Pitting: Minimal  Wound presence: none  Wound size: n/a  Wound color: n/a  Stemmer's Sign: +  Gait assessment: WNL, no AD  Transfer status:  WNL  Ability to don/doff clothing/garments: I     Assessment:  Pt demonstrates mild increase in R UE edema, overall fairly well managed but heaviness noted most thru upper arm, deltoid region into axilla, would benefit from CDT including MLD and compression  Also noted is L axilla ttp and restriction from anterior chest to medial aspect of elbow w/ sharp PN to palpation, also general tightness which follows a similar pattern to MITCH/cording - will treat accordingly  Mild ROM deficits and moderate postural deficits are noted w/ significant soft tissue tension thru posterior shldrs and L periscapular musculature w/ ttp along superior and medial scap borders for which skilled PT is also advised including soft tissue MS/MFR and scap mobs + postural education and strengthening  Pending response to skilled PT, PN mngmt may be warranted       Plan  Patient would benefit from: skilled PT  Planned therapy interventions: manual therapy, massage, patient education, home exercise program and compression  Frequency: 2x week  Duration in visits: 10  Duration in weeks: 6  Plan of Care beginning date: 8/10/2022  Plan of Care expiration date: 9/22/2022  Treatment plan discussed with: patient       Flowsheet Rows      Most Recent Value   Index Finger                                                                                                                                                            8-10-22 Update   R Index Finger Initial Girth  5 4 cm  V 5 5 cm RA (5 5cm) = +0 cm  (L 5 3cm) 5 1cm                                 5 4 cm     Palmar Crease   R Palmar Crease Initial Girth  17 75 cm  V 17 5 cm (17 5) cm = 0 cm  (L 18 25 cm) 18 25cm                       17 4 cm   Wrist   R Wrist Initial Girth  15 cm  V  14 75 cm (14 5)  = -0 25 cm  (L 14 25 cm) 14 25 cm                      14 75 cm   W+10cm   R W+10cm Initial Girth  20 5 cm  V 20 5 cm (20 25) = -0 25 cm  (L 20 cm) 20 cm                               21 1 cm   W+20cm   R W+20cm Initial Girth  23 25 cm  V 22 75 cm (22 5) = -0 25 cm  (L 22 5 cm) 22 25 cm                     23 5 cm   W+30cm   R W+30cm Initial Girth  29 5 cm  V 28 25 cm (27 75) = -0 5 cm  (L 27 cm) 26 75 cm                          28 5 cm   Elbow Joint   R Elbow Joint Initial Girth  22 75 cm  V 21 75 cm (22) = +0 25 cm  (L 22 cm) 22 cm                               22 5 cm   W+40cm   R W+40cm Initial Girth  32 75 cm  V 33 5 cm (32 25) = -1 25 cm  (L 29 cm) 28 75 cm                        33 cm            Assessment: Tolerated treatment well  Patient would benefit from continued PT  Measurements this day yield further reduction achieved w/ good management of R UE lymphedema and overall reduction of L shldr sx's and no increase of edema or sig concern for lymphedema in that arm    Pt does however continue to experience PN in L upper arm to shldr region w/ mild ROM and strength deficits but good functional use of L UE  Question L shldr impingement vs Hx of cervical radiculopathy secondary to DDD c/s  Pt to be measured for new compression garments R UE NV, Sept 29th, then anticipated d/c skilled PT to phase 2 CDT protocol - self management               Precautions:       Manuals             MLD R UE             MLD L axilla + upper arm             MFR L post shldr             R UE compression             Neuro Re-Ed                                                                                                        Ther Ex                                                                                                                     Ther Activity                                       Gait Training                                       Modalities

## 2022-08-11 ENCOUNTER — OFFICE VISIT (OUTPATIENT)
Dept: PHYSICAL THERAPY | Facility: CLINIC | Age: 67
End: 2022-08-11
Payer: MEDICARE

## 2022-08-11 DIAGNOSIS — M25.512 CHRONIC LEFT SHOULDER PAIN: ICD-10-CM

## 2022-08-11 DIAGNOSIS — I89.0 LYMPHEDEMA: Primary | ICD-10-CM

## 2022-08-11 DIAGNOSIS — G89.29 CHRONIC LEFT SHOULDER PAIN: ICD-10-CM

## 2022-08-11 PROCEDURE — 97140 MANUAL THERAPY 1/> REGIONS: CPT | Performed by: PHYSICAL THERAPIST

## 2022-08-11 NOTE — PROGRESS NOTES
Daily Note     Today's date: 2022  Patient name: Blaze Bueno  : 1955  MRN: [id number]  Referring provider: Elisabeth Hernandes MD  Dx:   Encounter Diagnosis     ICD-10-CM    1  Lymphedema  I89 0    2  Chronic left shoulder pain  M25 512     G89 29                   Subjective: Presents to initiate tx this day citing tightness and PN L UE, heaviness R        Objective: See treatment diary below      Assessment: Tolerated treatment well  Patient would benefit from continued PT  Pt reports + initial response to manuals, mod heaviness R upper arm, sig myofascial restriction L periscapular region and also medial L upper arm w/ mod ttp but tolerates manuals fair in this area  Light pec stretching initiates this day w/ HEP provided  Plan: Continue per plan of care           Precautions:       Manuals 8-11            MLD R UE jph            MLD L axilla + upper arm jph            MFR L post shldr jph            R UE compression             Neuro Re-Ed                                                                                                        Ther Ex             Seated pec str 10s x5            Doorway str 10s x5                                                                                          Ther Activity                                       Gait Training                                       Modalities

## 2022-08-17 ENCOUNTER — OFFICE VISIT (OUTPATIENT)
Dept: PHYSICAL THERAPY | Facility: CLINIC | Age: 67
End: 2022-08-17
Payer: MEDICARE

## 2022-08-17 DIAGNOSIS — M25.512 CHRONIC LEFT SHOULDER PAIN: ICD-10-CM

## 2022-08-17 DIAGNOSIS — G89.29 CHRONIC LEFT SHOULDER PAIN: ICD-10-CM

## 2022-08-17 DIAGNOSIS — I89.0 LYMPHEDEMA: Primary | ICD-10-CM

## 2022-08-17 PROCEDURE — 97140 MANUAL THERAPY 1/> REGIONS: CPT | Performed by: PHYSICAL THERAPIST

## 2022-08-17 NOTE — PROGRESS NOTES
Daily Note     Today's date: 2022  Patient name: Jannette Stark  : 1955  MRN: [id number]  Referring provider: Lina Perez MD  Dx:   Encounter Diagnosis     ICD-10-CM    1  Lymphedema  I89 0    2  Chronic left shoulder pain  M25 512     G89 29                   Subjective: Presents admitting 6/10 PN this day, medicated  Does feel a bit nauseous from morphine Rx  Objective: See treatment diary below      Assessment: Tolerated treatment well  Patient would benefit from continued PT  Marked sensitivity thru B posterior shldr to mid t/s and c/s regions this day w/ active guarding and marked tension realized as gentle manuals performed  Less restriction noted along medial upper arm but held on aggressiveness of manuals to this region as she is more sensitive this day and guarded  Does however reports a more relaxed sensation post tx this day  Plan: Continue per plan of care           Precautions:       Manuals 8- 8-17           MLD R UE jph jph           MLD L axilla + upper arm jph jph           MFR L post shldr jph jph           R UE compression             Gentle scap mobs B - seated  jph                        Neuro Re-Ed                                                                                                        Ther Ex             Seated pec str 10s x5            Doorway str 10s x5                                                                                          Ther Activity                                       Gait Training                                       Modalities

## 2022-08-19 ENCOUNTER — OFFICE VISIT (OUTPATIENT)
Dept: PHYSICAL THERAPY | Facility: CLINIC | Age: 67
End: 2022-08-19
Payer: MEDICARE

## 2022-08-19 DIAGNOSIS — M25.512 CHRONIC LEFT SHOULDER PAIN: ICD-10-CM

## 2022-08-19 DIAGNOSIS — G89.29 CHRONIC LEFT SHOULDER PAIN: ICD-10-CM

## 2022-08-19 DIAGNOSIS — I89.0 LYMPHEDEMA: Primary | ICD-10-CM

## 2022-08-19 PROCEDURE — 97140 MANUAL THERAPY 1/> REGIONS: CPT | Performed by: PHYSICAL THERAPIST

## 2022-08-19 NOTE — PROGRESS NOTES
Daily Note     Today's date: 2022  Patient name: Lucas Vogel  : 1955  MRN: [id number]  Referring provider: Ernie Nunez MD  Dx:   Encounter Diagnosis     ICD-10-CM    1  Lymphedema  I89 0    2  Chronic left shoulder pain  M25 512     G89 29                   Subjective: Pt Reports neck was down to a 5/10 after last tx session citing good resonse to manuals  Objective: See treatment diary below      Assessment: Tolerated treatment well  Patient would benefit from continued PT  Still sig tension but less sensitivity this day to manuals w/ reduced guarding  Less restriction also seen in L upper arm this day w/ MFR  Cont'd MLD to R UE  Plan: Continue per plan of care           Precautions:       Manuals 8- 8-17 8-19          MLD R UE jph jph jph          MLD L axilla + upper arm jph jph jph          MFR L post shldr jph jph jph          R UE compression             Gentle scap mobs B - seated  jph jph                       Neuro Re-Ed                                                                                                        Ther Ex             Seated pec str 10s x5            Doorway str 10s x5                                                                                          Ther Activity                                       Gait Training                                       Modalities

## 2022-08-24 ENCOUNTER — OFFICE VISIT (OUTPATIENT)
Dept: PHYSICAL THERAPY | Facility: CLINIC | Age: 67
End: 2022-08-24
Payer: MEDICARE

## 2022-08-24 DIAGNOSIS — I89.0 LYMPHEDEMA: Primary | ICD-10-CM

## 2022-08-24 DIAGNOSIS — G89.29 CHRONIC LEFT SHOULDER PAIN: ICD-10-CM

## 2022-08-24 DIAGNOSIS — M25.512 CHRONIC LEFT SHOULDER PAIN: ICD-10-CM

## 2022-08-24 PROCEDURE — 97140 MANUAL THERAPY 1/> REGIONS: CPT | Performed by: PHYSICAL THERAPIST

## 2022-08-24 NOTE — PROGRESS NOTES
Daily Note     Today's date: 2022  Patient name: Santiago Farr  : 1955  MRN: [id number]  Referring provider: Erica Lynch MD  Dx:   Encounter Diagnosis     ICD-10-CM    1  Lymphedema  I89 0    2  Chronic left shoulder pain  M25 512     G89 29                   Subjective: Pt reports neck PN rated 3-4/10, seeing some improvement w/ tx  Also some improvement in L axilla region w/ manuals, R arm pretty good  Objective: See treatment diary below      Assessment: Tolerated treatment well  Patient would benefit from continued PT  L upper arm along medial aspect sensitive, as is posterior shldrs and c/s as manuals performed, 3-410 post tx session  Guarding w/ passive shldr abd, but good/full ROM flexion L shldr      Plan: Continue per plan of care           Precautions:       Manuals 8-11 8-17 819 8-24         MLD R UE jph jph jph jph         MLD L axilla + upper arm jph jph jph jph         MFR L post shldr jph jph jph jph         R UE compression    jph night sleeve         Gentle scap mobs B - seated  jph jph jph         PROM L shldr    jph         Neuro Re-Ed                                                                                                        Ther Ex             Seated pec str 10s x5            Doorway str 10s x5                                                                                          Ther Activity                                       Gait Training                                       Modalities

## 2022-08-26 ENCOUNTER — OFFICE VISIT (OUTPATIENT)
Dept: PHYSICAL THERAPY | Facility: CLINIC | Age: 67
End: 2022-08-26
Payer: MEDICARE

## 2022-08-26 DIAGNOSIS — G89.29 CHRONIC LEFT SHOULDER PAIN: ICD-10-CM

## 2022-08-26 DIAGNOSIS — M25.512 CHRONIC LEFT SHOULDER PAIN: ICD-10-CM

## 2022-08-26 DIAGNOSIS — I89.0 LYMPHEDEMA: Primary | ICD-10-CM

## 2022-08-26 PROCEDURE — 97140 MANUAL THERAPY 1/> REGIONS: CPT | Performed by: PHYSICAL THERAPIST

## 2022-08-26 NOTE — PROGRESS NOTES
Daily Note     Today's date: 2022  Patient name: Chelsea Aldana  : 1955  MRN: [id number]  Referring provider: Marion Ricketts MD  Dx:   Encounter Diagnosis     ICD-10-CM    1  Lymphedema  I89 0    2  Chronic left shoulder pain  M25 512     G89 29                   Subjective: Pt admits 5/10 PN, sig in neck, hurt for prd of several hours the other evening after tx while working at computer  Objective: See treatment diary below      Assessment: Tolerated treatment well  Patient would benefit from continued PT  Moderate sensitivity of posterior shldr/c/s region w/ manuals this day but reducing tension/soft tissue restriction  PROM L shldr abd remains guarded but flexion WNL's  Pt's R UE lymphedema currently well managed, less heaviness in R axillary and upper arm regions responding well to consistent compression and MLD tx's  Plan: Continue per plan of care           Precautions:       Manuals 8-11 8-17 8- 8 8        MLD R UE jph jph jph jph jph        MLD L axilla + upper arm jph jph jph jph jph        MFR L post shldr jph jph jph jph jph        R UE compression    jph night sleeve jph night sleeve        Gentle scap mobs B - seated  jph jph jph jph        PROM L shldr    jph jph        Neuro Re-Ed                                                                                                        Ther Ex             Seated pec str 10s x5            Doorway str 10s x5                                                                                          Ther Activity                                       Gait Training                                       Modalities

## 2022-08-29 ENCOUNTER — APPOINTMENT (OUTPATIENT)
Dept: PHYSICAL THERAPY | Facility: CLINIC | Age: 67
End: 2022-08-29
Payer: MEDICARE

## 2022-08-31 ENCOUNTER — OFFICE VISIT (OUTPATIENT)
Dept: PHYSICAL THERAPY | Facility: CLINIC | Age: 67
End: 2022-08-31
Payer: MEDICARE

## 2022-08-31 DIAGNOSIS — I89.0 LYMPHEDEMA: Primary | ICD-10-CM

## 2022-08-31 DIAGNOSIS — M25.512 CHRONIC LEFT SHOULDER PAIN: ICD-10-CM

## 2022-08-31 DIAGNOSIS — G89.29 CHRONIC LEFT SHOULDER PAIN: ICD-10-CM

## 2022-08-31 PROCEDURE — 97140 MANUAL THERAPY 1/> REGIONS: CPT | Performed by: PHYSICAL THERAPIST

## 2022-08-31 NOTE — PROGRESS NOTES
Daily Note     Today's date: 2022  Patient name: Autumn Rizzo  : 1955  MRN: [id number]  Referring provider: Moe Burger MD  Dx:   Encounter Diagnosis     ICD-10-CM    1  Lymphedema  I89 0    2  Chronic left shoulder pain  M25 512     G89 29                   Subjective: Pt admits 4/10 PN in neck this day, improving  Was a 6/10 a day after being worked on last visit but seeing some + results  Objective: See treatment diary below      Assessment: Tolerated treatment well  Patient would benefit from continued PT  Reduced tension t/o c/s and posterior shldrs today, still relatively light manuals but better tolerance seen overall  Despite improvement seen in L  upper arm the last 2 visits, today there seems to be a bit of an increase in soft tissue restriction and ttp along medial upper arm  Plan: Continue per plan of care           Precautions:       Manuals 8-11 8-17 8-19 8-24 8- 8-31       MLD R UE jph jph jph jph jph jph       MLD L axilla + upper arm jph jph jph jph jph jph       MFR L post shldr jph jph jph jph jph jph       R UE compression    jph night sleeve jph night sleeve jph daytime       Gentle scap mobs B - seated  jph jph jph jph jph       PROM L shldr    jph jph jph       Neuro Re-Ed                                                                                                        Ther Ex             Seated pec str 10s x5            Doorway str 10s x5                                                                                          Ther Activity                                       Gait Training                                       Modalities

## 2022-09-02 ENCOUNTER — OFFICE VISIT (OUTPATIENT)
Dept: PHYSICAL THERAPY | Facility: CLINIC | Age: 67
End: 2022-09-02
Payer: MEDICARE

## 2022-09-02 DIAGNOSIS — I89.0 LYMPHEDEMA: Primary | ICD-10-CM

## 2022-09-02 DIAGNOSIS — M25.512 CHRONIC LEFT SHOULDER PAIN: ICD-10-CM

## 2022-09-02 DIAGNOSIS — G89.29 CHRONIC LEFT SHOULDER PAIN: ICD-10-CM

## 2022-09-02 PROCEDURE — 97140 MANUAL THERAPY 1/> REGIONS: CPT | Performed by: PHYSICAL THERAPIST

## 2022-09-02 NOTE — PROGRESS NOTES
Daily Note     Today's date: 2022  Patient name: Hayden Preston  : 1955  MRN: [id number]  Referring provider: Renny Vinson MD  Dx:   Encounter Diagnosis     ICD-10-CM    1  Lymphedema  I89 0    2  Chronic left shoulder pain  M25 512     G89 29                   Subjective: Pt admits 3/10 PN in neck this day, improving  Feeling tired/fatigued but having less PN  Objective: See treatment diary below      Assessment: Tolerated treatment well  Patient would benefit from continued PT  Pt a bit more "jumpy" today per her report w/ cont'd ttp along medial scapular borders w/ moderate soft tissue restriction, tension based, but improving overall w/ manuals  Improving c/s ROM and again R UE edema better managed  L medial upper arm remains sore but less painful and not as ttp  Plan: Continue per plan of care           Precautions:       Manuals 8-11 8-17 8-19 8-24 8-26 8-31 9-2      MLD R UE jph jph jph jph jph jph jph      MLD L axilla + upper arm jph jph jph jph jph jph jph      MFR L post shldr jph jph jph jph jph jph jph      R UE compression    jph night sleeve jph night sleeve jph daytime jph day      Gentle scap mobs B - seated  jph jph jph jph jph jph      PROM L shldr    jph jph jph jph      Neuro Re-Ed                                                                                                        Ther Ex             Seated pec str 10s x5            Doorway str 10s x5                                                                                          Ther Activity                                       Gait Training                                       Modalities

## 2022-09-12 ENCOUNTER — OFFICE VISIT (OUTPATIENT)
Dept: PHYSICAL THERAPY | Facility: CLINIC | Age: 67
End: 2022-09-12
Payer: MEDICARE

## 2022-09-12 DIAGNOSIS — M25.512 CHRONIC LEFT SHOULDER PAIN: ICD-10-CM

## 2022-09-12 DIAGNOSIS — I89.0 LYMPHEDEMA: Primary | ICD-10-CM

## 2022-09-12 DIAGNOSIS — G89.29 CHRONIC LEFT SHOULDER PAIN: ICD-10-CM

## 2022-09-12 PROCEDURE — 97140 MANUAL THERAPY 1/> REGIONS: CPT | Performed by: PHYSICAL THERAPIST

## 2022-09-12 NOTE — PROGRESS NOTES
Daily Note     Today's date: 2022  Patient name: Beena Perry  : 1955  MRN: [id number]  Referring provider: Leonel Ordaz MD  Dx:   Encounter Diagnosis     ICD-10-CM    1  Lymphedema  I89 0    2  Chronic left shoulder pain  M25 512     G89 29                   Subjective: Pt reports PN this day 5/10 from neck into L posterior shldr just posterior to axilla, increased a bit, frustrated by nausea she feels is created by her PN medication, morphine  Pt did recently consult w/ PN mngmt specialist   Juan Jose Elizalde today her fibromyalgia has "kicked up more"  Objective: See treatment diary below      Assessment: Tolerated treatment well  Patient would benefit from continued PT  Moderate tension and guarded response to MS/MFR this day more sig in L posterior shldr + L upper arm regions vs c/s   R UE edema appears well managed and this is discussed this day regarding pt's due diligence to use of compression garments and maintaining proper skin health  Will measure R UE within next visit or two  Plan: Continue per plan of care           Precautions:       Manuals 8-11 8-17 8-19 8-24 8- 8-31 9-2 9-12     MLD R UE jph jph jph jph jph jph jph jph     MLD L axilla + upper arm jph jph jph jph jph jph jph jph     MFR L post shldr jph jph jph jph jph jph jph jph     R UE compression    jph night sleeve jph night sleeve jph daytime jph day jph day     Gentle scap mobs B - seated  jph jph jph jph jph jph jph     PROM L shldr    jph jph jph jph jph     Neuro Re-Ed                                                                                                        Ther Ex             Seated pec str 10s x5            Doorway str 10s x5                                                                                          Ther Activity                                       Gait Training                                       Modalities

## 2022-09-16 ENCOUNTER — OFFICE VISIT (OUTPATIENT)
Dept: PHYSICAL THERAPY | Facility: CLINIC | Age: 67
End: 2022-09-16
Payer: MEDICARE

## 2022-09-16 DIAGNOSIS — M25.512 CHRONIC LEFT SHOULDER PAIN: ICD-10-CM

## 2022-09-16 DIAGNOSIS — I89.0 LYMPHEDEMA: Primary | ICD-10-CM

## 2022-09-16 DIAGNOSIS — G89.29 CHRONIC LEFT SHOULDER PAIN: ICD-10-CM

## 2022-09-16 PROCEDURE — 97140 MANUAL THERAPY 1/> REGIONS: CPT | Performed by: PHYSICAL THERAPIST

## 2022-09-16 NOTE — PROGRESS NOTES
Daily Note     Today's date: 2022  Patient name: Oscar Wilks  : 1955  MRN: [id number]  Referring provider: Sung Gomez MD  Dx:   Encounter Diagnosis     ICD-10-CM    1  Lymphedema  I89 0    2  Chronic left shoulder pain  M25 512     G89 29                   Subjective: Pt reports PN this day 3/10 from neck into L posterior shldr, improved a bit and lasting like this the past several days  Feels more hopeful she can function a bit better  Objective: See treatment diary below      Assessment: Tolerated treatment well  Patient would benefit from continued PT  Reducing tension t/o B posterior shldrs and c/s w/ decreased PN c/o this day, still a PN c/o L UE into posterior shldr she reports occurs less during day, more at night, disrupting sleep at times  Maintains R UE compression garments and R UE lymphedema is well managed at this day, will re-measure NV  More fluid PROM of L shldr this day w/ abd more WNL's  Plan: Continue per plan of care           Precautions:       Manuals 8-11 8-17 8-19 8-24 8-26 8-31 9-2 9-12 9-16    MLD R UE jph jph jph jph jph jph jph jph jph    MLD L axilla + upper arm jph jph jph jph jph jph jph jph jph    MFR L post shldr jph jph jph jph jph jph jph jph jph    R UE compression    jph night sleeve jph night sleeve jph daytime jph day jph day jph day    Gentle scap mobs B - seated  jph jph jph jph jph jph jph jph    PROM L shldr    jph jph jph jph jph jph    Neuro Re-Ed                                                                                                        Ther Ex             Seated pec str 10s x5            Doorway str 10s x5                                                                                          Ther Activity                                       Gait Training                                       Modalities

## 2022-09-21 ENCOUNTER — OFFICE VISIT (OUTPATIENT)
Dept: PHYSICAL THERAPY | Facility: CLINIC | Age: 67
End: 2022-09-21
Payer: MEDICARE

## 2022-09-21 DIAGNOSIS — I89.0 LYMPHEDEMA: Primary | ICD-10-CM

## 2022-09-21 DIAGNOSIS — G89.29 CHRONIC LEFT SHOULDER PAIN: ICD-10-CM

## 2022-09-21 DIAGNOSIS — M25.512 CHRONIC LEFT SHOULDER PAIN: ICD-10-CM

## 2022-09-21 PROCEDURE — 97140 MANUAL THERAPY 1/> REGIONS: CPT | Performed by: PHYSICAL THERAPIST

## 2022-09-21 NOTE — PROGRESS NOTES
Daily Note     Today's date: 2022  Patient name: Za Sales  : 1955  MRN: [id number]  Referring provider: Alanis Selby MD  Dx:   Encounter Diagnosis     ICD-10-CM    1  Lymphedema  I89 0    2  Chronic left shoulder pain  M25 512     G89 29                   Subjective: Pt reports PN remains 3/10, medicated, responding well to PT tx, questions whether she should have PN mngmt  Objective: See treatment diary below      Assessment: Tolerated treatment well  Patient would benefit from continued PT  Measure R UE lymph NV, managed well, assessed grossly this day  More tolerable to MFR/MS techniques this day posterior shldrs able to go a but more aggressive  Still a ttp medial L upper arm, pt citing most of her PN is noted at night, waking her several times in the L arm  Question cervical radiculopathy  Encouraged f/u w/ PN mngmt to further investigate methods to minimize reliance on PN medication  Will cont to advance skilled PT accordingly  Plan: Continue per plan of care           Precautions:       Manuals 8-11 8-17 8-19 8-24 8-26 8-31 9-2 9-12 9-16 9-21   MLD R UE jph jph jph jph jph jph jph jph jph jph   MLD L axilla + upper arm jph jph jph jph jph jph jph jph jph jph   MFR L post shldr jph jph jph jph jph jph jph jph jph jph   R UE compression    jph night sleeve jph night sleeve jph daytime jph day jph day jph day jph day   Gentle scap mobs B - seated  jph jph jph jph jph jph jph jph jph   PROM L shldr    jph jph jph jph jph jph jph   Neuro Re-Ed                                                                                                        Ther Ex             Seated pec str 10s x5            Doorway str 10s x5                                                                                          Ther Activity                                       Gait Training                                       Modalities

## 2022-09-23 ENCOUNTER — OFFICE VISIT (OUTPATIENT)
Dept: PHYSICAL THERAPY | Facility: CLINIC | Age: 67
End: 2022-09-23
Payer: MEDICARE

## 2022-09-23 DIAGNOSIS — G89.29 CHRONIC LEFT SHOULDER PAIN: ICD-10-CM

## 2022-09-23 DIAGNOSIS — M25.512 CHRONIC LEFT SHOULDER PAIN: ICD-10-CM

## 2022-09-23 DIAGNOSIS — I89.0 LYMPHEDEMA: Primary | ICD-10-CM

## 2022-09-23 PROCEDURE — 97164 PT RE-EVAL EST PLAN CARE: CPT | Performed by: PHYSICAL THERAPIST

## 2022-09-23 PROCEDURE — 97140 MANUAL THERAPY 1/> REGIONS: CPT | Performed by: PHYSICAL THERAPIST

## 2022-09-23 NOTE — LETTER
2022    [address] [place] [address]    Patient: Yohana Cordon   YOB: 1955   Date of Visit: 2022     Encounter Diagnosis     ICD-10-CM    1  Lymphedema  I89 0    2  Chronic left shoulder pain  M25 512     G89 29        Dear Dr Gracie Hayward: Thank you for your recent referral of Yohana Cordon  Please review the attached evaluation summary from Shonna's recent visit  Please verify that you agree with the plan of care by signing the attached order  If you have any questions or concerns, please do not hesitate to call  I sincerely appreciate the opportunity to share in the care of one of your patients and hope to have another opportunity to work with you in the near future  Sincerely,    Dannie Shields, PT      Referring Provider:      I certify that I have read the below Plan of Care and certify the need for these services furnished under this plan of treatment while under my care  [address]  [place]  [place] [address]  Via Fax: [phone]          PT Re-Evaluation    Today's date: 2022  Patient name: Yohana Cordon  : 1955  MRN: [id number]  Referring provider: Galen Langley MD  Dx:   Encounter Diagnosis     ICD-10-CM    1  Lymphedema  I89 0    2  Chronic left shoulder pain  M25 512     G89 29                   Subjective: Pt reports PN remains 3/10, medicated, but agitation of her sciatica the last day or so  2/10 in her L arm  Pt overall acknowledges improvement citing a reduction of edema in her R arm, feeling less heaviness, and gains in activity tolerance/potential  She feels her neck PN overall has reduced approx 50%        Pain  Current pain rating: 3  At best pain ratin  At worst pain ratin  Location: L axilla, neck  Quality: dull ache, discomfort, squeezing, tight and pressure  Aggravating factors: reaching, overuse  Progression: improving      Diagnostic Tests  X-ray + for mild OA L AC jt  Treatments  Previous treatment: physical therapy for lymphedema management  Patient Goals  Patient goals for therapy: independence with ADLs/IADLs, return to sport/leisure activities, decreased pain and decreased edema  Patient goal: Reduce R UE edema, address L UE PN       Objective: See treatment diary below  Goals  ST  Pt to achieve girth reduction R upper arm within 3 wks-MET  2  Pt to maintain daily and nighttime garments within 1 wk-MET  3  Reduce PN L UE <3/10 w/ all activity within 3 wks-MET  LT  Fitted for new daytime compression garments within 6 wks-In progress  2  Achieve R UE reduced girth measurements thru upper arm >10% within 5 wks-In progress  3  Reduce PN <1/10 L UE within 6 wks-Not met  4  Improve postural strength 5/5 t/o within 6 wks-In progress    Objective:    Palpation   Mild ttp L anterior aspect of shldr, bicipital groove, to medial epicondyle of elbow  PN pattern presents in similar pattern (from anterior L chest to medial elbow) as commonly seen in cording/MITCH- this has sig reduced and now presents very minimal   Moderate ttp L superior pole and border of scapula, medial aspect of scapula yields mild to mod ttp within soft tissue structures w/ moderate soft tissue restriction - this has also improved overall despite sig sensitivity t/o to palpation    Active Range of Motion     Additional Active Range of Motion Details  L shldr flexion 176 deg   L shldr abd 176 deg  L Shldr - cross over, + Mykel & Ayala for mild PN/guarding    Muscle Activation   L shldr strength 4/5 to 4+/5 t/o  Mild guarding into flexion OH and abd w/ posturing fair, Mod B rounded shldrs and fwd head but improved w/ cueing  General Comments:     Ankle/Foot Comments   Physical assessment:  Palpation: Minimal heaviness  Skin Mobility: Minimal restriction and fibrosis R upper arm  Skin color: WNL  Pitting: Minimal  Wound presence: none  Wound size: n/a  Wound color: n/a  Stemmer's Sign: +  Gait assessment: WNL, no AD  Transfer status:  WNL  Ability to don/doff clothing/garments: I       UPPER EXTREMITY LEFT CALCULATIONS    Flowsheet Row Most Recent Value   Volume UE (mL) 2221   Difference from last visit (mL)  -2221   Difference from first visit (mL)  -2221      UPPER EXTREMITY RIGHT CALCULATIONS    Flowsheet Row Most Recent Value   Volume UE (mL) 2359   Difference from last visit (mL)  -2359   Difference from first visit (mL)  -2359         See Flow Sheet for Girth Measurements of above volumetrics      Flowsheet Rows      Most Recent Value   Index Finger                                                                                                                                                            8-10-22             9-23-22   R Index Finger Initial Girth  5 4 cm  V 5 5 cm RA (5 5cm) = +0 cm  (L 5 3cm) 5 1cm                                 5 4 cm                5 5 cm     Palmar Crease   R Palmar Crease Initial Girth  17 75 cm  V 17 5 cm (17 5) cm = 0 cm  (L 18 25 cm) 18 25cm                       17 4 cm              17 4 cm   Wrist   R Wrist Initial Girth  15 cm  V  14 75 cm (14 5)  = -0 25 cm  (L 14 25 cm) 14 25 cm                      14 75 cm            14 4 cm   W+10cm   R W+10cm Initial Girth  20 5 cm  V 20 5 cm (20 25) = -0 25 cm  (L 20 cm) 20 cm                               21 1 cm               20 9 cm    W+20cm   R W+20cm Initial Girth  23 25 cm  V 22 75 cm (22 5) = -0 25 cm  (L 22 5 cm) 22 25 cm                     23 5 cm               22 5 cm   W+30cm   R W+30cm Initial Girth  29 5 cm  V 28 25 cm (27 75) = -0 5 cm  (L 27 cm) 26 75 cm                          28 5 cm               28 cm   Elbow Joint   R Elbow Joint Initial Girth  22 75 cm  V 21 75 cm (22) = +0 25 cm  (L 22 cm) 22 cm                               22 5 cm               22 cm   W+40cm   R W+40cm Initial Girth  32 75 cm  V 33 5 cm (32 25) = -1 25 cm  (L 29 cm) 28 75 cm                        33 cm                  32 4 cm            Assessment: Tolerated treatment well  Patient would benefit from continued PT  Pt demonstrates overall improvement including increased c/s and shldr ROM, reduced PN, and less guarding t/o B posterior shldrs and in L UE w/ markedly reduced soft tissue restriction  Pt's volume measurements show good management of R UE and advancement toward optimal reduction w/ plan to RA for new compression garments within the next visit or two leading up to d/c once goals achieved  Primary concern remains hypersensitivity, limited posturing, and chronic PN c/o requiring Rx medication        Plan  Patient would benefit from: skilled PT  Planned therapy interventions: manual therapy, massage, patient education, home exercise program and compression  Frequency: 2x week  Duration in visits: 8  Duration in weeks: 4  Plan of Care beginning date: 9/23/2022  Plan of Care expiration date: 10/22/2022  Treatment plan discussed with: patient          Precautions:       Manuals 9-23      9-2 9-12 9-16 9-21   MLD R UE jph      jph jph jph jph   MLD L axilla + upper arm jph      jph jph jph jph   MFR L post shldr jph      jph jph jph jph   R UE compression jph night      jph day jph day jph day jph day   Gentle scap mobs B - seated jph      jph jph jph jph   PROM L shldr jph      jph jph jph jph   Neuro Re-Ed                                                                                                        Ther Ex             Seated pec str             Doorway str                                                                                           Ther Activity                                       Gait Training                                       Modalities

## 2022-09-23 NOTE — PROGRESS NOTES
PT Re-Evaluation    Today's date: 2022  Patient name: Ron Melendez  : 1955  MRN: [id number]  Referring provider: Yaneli Holliday MD  Dx:   Encounter Diagnosis     ICD-10-CM    1  Lymphedema  I89 0    2  Chronic left shoulder pain  M25 512     G89 29                   Subjective: Pt reports PN remains 3/10, medicated, but agitation of her sciatica the last day or so  2/10 in her L arm  Pt overall acknowledges improvement citing a reduction of edema in her R arm, feeling less heaviness, and gains in activity tolerance/potential  She feels her neck PN overall has reduced approx 50%  Pain  Current pain rating: 3  At best pain ratin  At worst pain ratin  Location: L axilla, neck  Quality: dull ache, discomfort, squeezing, tight and pressure  Aggravating factors: reaching, overuse  Progression: improving      Diagnostic Tests  X-ray + for mild OA L AC jt  Treatments  Previous treatment: physical therapy for lymphedema management  Patient Goals  Patient goals for therapy: independence with ADLs/IADLs, return to sport/leisure activities, decreased pain and decreased edema  Patient goal: Reduce R UE edema, address L UE PN       Objective: See treatment diary below  Goals  ST  Pt to achieve girth reduction R upper arm within 3 wks-MET  2  Pt to maintain daily and nighttime garments within 1 wk-MET  3  Reduce PN L UE <3/10 w/ all activity within 3 wks-MET  LT  Fitted for new daytime compression garments within 6 wks-In progress  2  Achieve R UE reduced girth measurements thru upper arm >10% within 5 wks-In progress  3  Reduce PN <1/10 L UE within 6 wks-Not met  4  Improve postural strength 5/5 t/o within 6 wks-In progress    Objective:    Palpation   Mild ttp L anterior aspect of shldr, bicipital groove, to medial epicondyle of elbow    PN pattern presents in similar pattern (from anterior L chest to medial elbow) as commonly seen in cording/MITCH- this has sig reduced and now presents very minimal   Moderate ttp L superior pole and border of scapula, medial aspect of scapula yields mild to mod ttp within soft tissue structures w/ moderate soft tissue restriction - this has also improved overall despite sig sensitivity t/o to palpation    Active Range of Motion     Additional Active Range of Motion Details  L shldr flexion 176 deg   L shldr abd 176 deg  L Shldr - cross over, + Mykel & Ayala for mild PN/guarding    Muscle Activation   L shldr strength 4/5 to 4+/5 t/o  Mild guarding into flexion OH and abd w/ posturing fair, Mod B rounded shldrs and fwd head but improved w/ cueing  General Comments:     Ankle/Foot Comments   Physical assessment:  Palpation: Minimal heaviness  Skin Mobility: Minimal restriction and fibrosis R upper arm  Skin color: WNL  Pitting: Minimal  Wound presence: none  Wound size: n/a  Wound color: n/a  Stemmer's Sign: +  Gait assessment: WNL, no AD  Transfer status:  WNL  Ability to don/doff clothing/garments: I       UPPER EXTREMITY LEFT CALCULATIONS    Flowsheet Row Most Recent Value   Volume UE (mL) 2221   Difference from last visit (mL)  -2221   Difference from first visit (mL)  -2221      UPPER EXTREMITY RIGHT CALCULATIONS    Flowsheet Row Most Recent Value   Volume UE (mL) 2359   Difference from last visit (mL)  -2359   Difference from first visit (mL)  -2359         See Flow Sheet for Girth Measurements of above volumetrics      Flowsheet Rows      Most Recent Value   Index Finger                                                                                                                                                            8-10-22             9-23-22   R Index Finger Initial Girth  5 4 cm  V 5 5 cm RA (5 5cm) = +0 cm  (L 5 3cm) 5 1cm                                 5 4 cm                5 5 cm     Palmar Crease   R Palmar Crease Initial Girth  17 75 cm  V 17 5 cm (17 5) cm = 0 cm  (L 18 25 cm) 18 25cm                       17 4 cm              17 4 cm   Wrist R Wrist Initial Girth  15 cm  V  14 75 cm (14 5)  = -0 25 cm  (L 14 25 cm) 14 25 cm                      14 75 cm            14 4 cm   W+10cm   R W+10cm Initial Girth  20 5 cm  V 20 5 cm (20 25) = -0 25 cm  (L 20 cm) 20 cm                               21 1 cm               20 9 cm    W+20cm   R W+20cm Initial Girth  23 25 cm  V 22 75 cm (22 5) = -0 25 cm  (L 22 5 cm) 22 25 cm                     23 5 cm               22 5 cm   W+30cm   R W+30cm Initial Girth  29 5 cm  V 28 25 cm (27 75) = -0 5 cm  (L 27 cm) 26 75 cm                          28 5 cm               28 cm   Elbow Joint   R Elbow Joint Initial Girth  22 75 cm  V 21 75 cm (22) = +0 25 cm  (L 22 cm) 22 cm                               22 5 cm               22 cm   W+40cm   R W+40cm Initial Girth  32 75 cm  V 33 5 cm (32 25) = -1 25 cm  (L 29 cm) 28 75 cm                        33 cm                  32 4 cm            Assessment: Tolerated treatment well  Patient would benefit from continued PT  Pt demonstrates overall improvement including increased c/s and shldr ROM, reduced PN, and less guarding t/o B posterior shldrs and in L UE w/ markedly reduced soft tissue restriction  Pt's volume measurements show good management of R UE and advancement toward optimal reduction w/ plan to RA for new compression garments within the next visit or two leading up to d/c once goals achieved  Primary concern remains hypersensitivity, limited posturing, and chronic PN c/o requiring Rx medication        Plan  Patient would benefit from: skilled PT  Planned therapy interventions: manual therapy, massage, patient education, home exercise program and compression  Frequency: 2x week  Duration in visits: 8  Duration in weeks: 4  Plan of Care beginning date: 9/23/2022  Plan of Care expiration date: 10/22/2022  Treatment plan discussed with: patient          Precautions:       Manuals 9-23      9-2 9-12 9-16 9-21   MLD R UE jph      jp jp jp jp   MLD L axilla + upper arm jph      jph jph jph jp   MFR L post shldr jp      jph jph jp jp   R UE compression jph night      jp day jp day jp day jp day   Gentle scap mobs B - seated jp      jp jp jp jp   PROM L shldr jp      jp jp jp jp   Neuro Re-Ed                                                                                                        Ther Ex             Seated pec str             Doorway str                                                                                           Ther Activity                                       Gait Training                                       Modalities

## 2022-09-28 ENCOUNTER — APPOINTMENT (OUTPATIENT)
Dept: PHYSICAL THERAPY | Facility: CLINIC | Age: 67
End: 2022-09-28
Payer: MEDICARE

## 2022-09-28 ENCOUNTER — OFFICE VISIT (OUTPATIENT)
Dept: PHYSICAL THERAPY | Facility: CLINIC | Age: 67
End: 2022-09-28
Payer: MEDICARE

## 2022-09-28 DIAGNOSIS — M25.512 CHRONIC LEFT SHOULDER PAIN: ICD-10-CM

## 2022-09-28 DIAGNOSIS — I89.0 LYMPHEDEMA: Primary | ICD-10-CM

## 2022-09-28 DIAGNOSIS — G89.29 CHRONIC LEFT SHOULDER PAIN: ICD-10-CM

## 2022-09-28 PROCEDURE — 97140 MANUAL THERAPY 1/> REGIONS: CPT | Performed by: PHYSICAL THERAPIST

## 2022-09-28 NOTE — PROGRESS NOTES
Daily Note     Today's date: 2022  Patient name: Za Sales  : 1955  MRN: [id number]  Referring provider: Alanis Selby MD  Dx:   Encounter Diagnosis     ICD-10-CM    1  Lymphedema  I89 0    2  Chronic left shoulder pain  M25 512     G89 29                   Subjective: Pt presents reporting 2/10 PN in neck/shldr this day medicated, but responding well overall to tx, seeing some improvement  Objective: See treatment diary below      Assessment: Tolerated treatment well  Patient would benefit from continued PT  Less sig restriction t/o soft tissue of upper to mid back w/ reduced tightness of B posterior shldr/periscapular musculature  Minimal ttp L upper arm this day as well and lymphedema well managed R UE based on last sessions measurements w/ minimal visible change this day from last visit as pt consistently maintains garments  Plan: Continue per plan of care           Precautions:       Manuals 28     9-2 9-12 9-16 9-21   MLD R UE jph jph     jph jph jph jph   MLD L axilla + upper arm jph jph     jph jph jph jph   MFR L post shldr jph jph     jph jph jph jph   R UE compression jph night jph night     jph day jph day jph day jph day   Gentle scap mobs B - seated jph jph     jph jph jph jph   PROM L shldr jph jph     jph jph jph jph   Neuro Re-Ed                                                                                                        Ther Ex             Seated pec str             Doorway str                                                                                           Ther Activity                                       Gait Training                                       Modalities

## 2022-09-30 ENCOUNTER — OFFICE VISIT (OUTPATIENT)
Dept: PHYSICAL THERAPY | Facility: CLINIC | Age: 67
End: 2022-09-30
Payer: MEDICARE

## 2022-09-30 DIAGNOSIS — G89.29 CHRONIC LEFT SHOULDER PAIN: ICD-10-CM

## 2022-09-30 DIAGNOSIS — I89.0 LYMPHEDEMA: Primary | ICD-10-CM

## 2022-09-30 DIAGNOSIS — M25.512 CHRONIC LEFT SHOULDER PAIN: ICD-10-CM

## 2022-09-30 PROCEDURE — 97140 MANUAL THERAPY 1/> REGIONS: CPT | Performed by: PHYSICAL THERAPIST

## 2022-09-30 NOTE — PROGRESS NOTES
Daily Note     Today's date: 2022  Patient name: Jannette Stark  : 1955  MRN: [id number]  Referring provider: Lina Perez MD  Dx:   Encounter Diagnosis     ICD-10-CM    1  Lymphedema  I89 0    2  Chronic left shoulder pain  M25 512     G89 29                   Subjective: Pt presents admitting a PN increase 3-4/10 this day more specific to neck, admits PN in L upper arm predominantly occurs at night, not as sig during the day  Objective: See treatment diary below      Assessment: Tolerated treatment well  Patient would benefit from continued PT  Pt cont to have an increased tolerance to palpation t/o mid and upper back thru c/s w/ reducing muscle tension  Good ROM of L shldr WNL OH this day w/ minimal to no guarding when stretched passively in a slow fashion  Pt does cont to express concern regarding more chronic nature of overall PN and reliance on PN Rx, has upcoming appt w/ PN mngmt next Thursday  Plan: Continue per plan of care           Precautions:       Manuals  930    9-2 9-12 9-16 9-21   MLD R UE jph jph jph    jph jph jph jph   MLD L axilla + upper arm jph jph jph    jph jph jph jph   MFR L post shldr jph jph jph    jph jph jph jph   R UE compression jph night jph night jph day    jph day jph day jph day jph day   Gentle scap mobs B - seated jph jph jph    jph jph jph jph   PROM L shldr jph jph jph    jph jph jph jph   Neuro Re-Ed                                                                                                        Ther Ex             Seated pec str             Doorway str                                                                                           Ther Activity                                       Gait Training                                       Modalities

## 2022-10-03 ENCOUNTER — OFFICE VISIT (OUTPATIENT)
Dept: PHYSICAL THERAPY | Facility: CLINIC | Age: 67
End: 2022-10-03
Payer: MEDICARE

## 2022-10-03 DIAGNOSIS — G89.29 CHRONIC LEFT SHOULDER PAIN: ICD-10-CM

## 2022-10-03 DIAGNOSIS — I89.0 LYMPHEDEMA: Primary | ICD-10-CM

## 2022-10-03 DIAGNOSIS — M25.512 CHRONIC LEFT SHOULDER PAIN: ICD-10-CM

## 2022-10-03 PROCEDURE — 97140 MANUAL THERAPY 1/> REGIONS: CPT | Performed by: PHYSICAL THERAPIST

## 2022-10-03 PROCEDURE — 97110 THERAPEUTIC EXERCISES: CPT | Performed by: PHYSICAL THERAPIST

## 2022-10-04 ENCOUNTER — OFFICE VISIT (OUTPATIENT)
Dept: PHYSICAL THERAPY | Facility: CLINIC | Age: 67
End: 2022-10-04
Payer: MEDICARE

## 2022-10-04 DIAGNOSIS — M25.512 CHRONIC LEFT SHOULDER PAIN: ICD-10-CM

## 2022-10-04 DIAGNOSIS — G89.29 CHRONIC LEFT SHOULDER PAIN: ICD-10-CM

## 2022-10-04 DIAGNOSIS — I89.0 LYMPHEDEMA: Primary | ICD-10-CM

## 2022-10-04 PROCEDURE — 97110 THERAPEUTIC EXERCISES: CPT | Performed by: PHYSICAL THERAPIST

## 2022-10-04 PROCEDURE — 97140 MANUAL THERAPY 1/> REGIONS: CPT | Performed by: PHYSICAL THERAPIST

## 2022-10-04 NOTE — PROGRESS NOTES
Daily Note     Today's date: 10/4/2022  Patient name: Erin Bateman  : 1955  MRN: [id number]  Referring provider: Bere Stack MD  Dx:   Encounter Diagnosis     ICD-10-CM    1  Lymphedema  I89 0    2  Chronic left shoulder pain  M25 512     G89 29                   Subjective: Pt reports feeling quite tired/fatigued this day but good initial response to updated HEP w/ TB last visit  Objective: See treatment diary below      Assessment: Tolerated treatment well  Patient would benefit from continued PT  Initiated UBE this day x 3 min w/ mild fatigue, challenged more by the backward row  Cont'd + response to muscle activation then manuals after w/ again less tension and reduced ttp t/o t/s to c/s and B posterior shldrs, still mod tension thru sub-occipital region  L shldr ROM is WNL, just mild guarding at end range while R UE lymphedema cont to be better managed w/ consistent tx  Plan: Continue per plan of care           Precautions:       Manuals  9-28 9-30 10-3 10-4  9-2 9-12 9-16 9-21   MLD R UE jph jph jph jph jph  jph jph jph jph   MLD L axilla + upper arm jph jph jph jph jph  jph [de-identified] jph jph   MFR L post shldr jph jph MCT NNZ QOS  EJD ONW FWL FQA   R UE compression jph night jph night jph day jph day jph day  jph day jph day jph day jph day   Gentle scap mobs B - seated jph jph jph jph jph  jph jph jph jph   PROM L shldr VBH PKD JNZ VYC ADC  NAS jph jph jph   Neuro Re-Ed                                                                                                        Ther Ex             Seated pec str             Doorway str             Scap retraction    GrnTB 3s x20         B shldr ER    GrnTB x20         B shldr extension    GrnTB x20         UBE f/r     1 5'e x3' total                                  Ther Activity                                       Gait Training                                       Modalities

## 2022-10-05 ENCOUNTER — APPOINTMENT (OUTPATIENT)
Dept: PHYSICAL THERAPY | Facility: CLINIC | Age: 67
End: 2022-10-05

## 2022-10-07 ENCOUNTER — APPOINTMENT (OUTPATIENT)
Dept: PHYSICAL THERAPY | Facility: CLINIC | Age: 67
End: 2022-10-07

## 2022-10-12 ENCOUNTER — OFFICE VISIT (OUTPATIENT)
Dept: PHYSICAL THERAPY | Facility: CLINIC | Age: 67
End: 2022-10-12
Payer: MEDICARE

## 2022-10-12 DIAGNOSIS — G89.29 CHRONIC LEFT SHOULDER PAIN: ICD-10-CM

## 2022-10-12 DIAGNOSIS — I89.0 LYMPHEDEMA: Primary | ICD-10-CM

## 2022-10-12 DIAGNOSIS — M25.512 CHRONIC LEFT SHOULDER PAIN: ICD-10-CM

## 2022-10-12 PROCEDURE — 97140 MANUAL THERAPY 1/> REGIONS: CPT | Performed by: PHYSICAL THERAPIST

## 2022-10-12 NOTE — PROGRESS NOTES
Daily Note     Today's date: 10/12/2022  Patient name: Lan Burleson  : 1955  MRN: [id number]  Referring provider: Legrand Cabot, MD  Dx:   Encounter Diagnosis     ICD-10-CM    1  Lymphedema  I89 0    2  Chronic left shoulder pain  M25 512     G89 29                   Subjective: Pt reports feeling very tired, PN in L arm overnight and some abdominal issues/cramping that affected sleep  Objective: See treatment diary below      Assessment: Tolerated treatment well  Patient would benefit from continued PT  Kept UBE to 1' e direction today, 2' total secondary to pt's c/o general fatigue  R UE assessed grossly cont to reduce in volume w/ fitment of jobst relax night time garment indicating possibility to size down - plan to consider measurement for garments within the next visit or two  L shldr ROM is WNL passively w/ a mild PN response into end range shldr abd but less guarding noted  Best yet tolerance to palpation thru posterior shldrs, c/s and mid t/s w/ reduced tension of soft tissue t/o  Plan: Continue per plan of care           Precautions:       Manuals 9- 9-28 9-30 10-3 10-4 10-12       MLD R UE jph jph jph jph jph jph       MLD L axilla + upper arm jph jph Catia Wilson       MFR L post shldr jph jph jph jph jph jph       R UE compression jph night jph night jph day jph day jph day jph night       Gentle scap mobs B - seated jph jph jph jph jph jph       PROM L shldr jph jph jph jph jph jph       Neuro Re-Ed                                                                                                        Ther Ex             Seated pec str             Doorway str             Scap retraction    GrnTB 3s x20         B shldr ER    GrnTB x20         B shldr extension    GrnTB x20         UBE f/r     1 5'e x3' total x1'e 2' total                                 Ther Activity                                       Gait Training                                       Modalities

## 2022-10-14 ENCOUNTER — OFFICE VISIT (OUTPATIENT)
Dept: PHYSICAL THERAPY | Facility: CLINIC | Age: 67
End: 2022-10-14
Payer: MEDICARE

## 2022-10-14 DIAGNOSIS — G89.29 CHRONIC LEFT SHOULDER PAIN: ICD-10-CM

## 2022-10-14 DIAGNOSIS — I89.0 LYMPHEDEMA: Primary | ICD-10-CM

## 2022-10-14 DIAGNOSIS — M25.512 CHRONIC LEFT SHOULDER PAIN: ICD-10-CM

## 2022-10-14 PROCEDURE — 97140 MANUAL THERAPY 1/> REGIONS: CPT | Performed by: PHYSICAL THERAPIST

## 2022-10-14 NOTE — PROGRESS NOTES
Daily Note     Today's date: 10/14/2022  Patient name: Thais Busch  : 1955  MRN: [id number]  Referring provider: Dorota Bates MD  Dx:   Encounter Diagnosis     ICD-10-CM    1  Lymphedema  I89 0    2  Chronic left shoulder pain  M25 512     G89 29                   Subjective: Pt reports less L shldr PN but her R arm sleeve was too difficult to put on this AM feeling as if her R arm felt too tight  Objective: See treatment diary below      Assessment: Tolerated treatment well  Patient would benefit from continued PT    R wrist yields tightness when donning daytime compression sleeve and glove, otherwise good fitment noted, will likely measure next week to make adjustments to measurements for pt to order new garments  L shldr ROM WNL's this day, minimal PN response, also cont'd marked reduction of soft tissue restriction thru posterior shldrs, c/s to t/s  Plan: Continue per plan of care           Precautions:       Manuals  9- 9-30 10-3 10-4 10-12 10-14      MLD R UE jph jph jph jph jph jph jph      MLD L axilla + upper arm jph jph Eliana Mate      MFR L post shldr jph jph jph jph jph jph jph      R UE compression jph night jph night jph day jph day jph day jph night jph day      Gentle scap mobs B - seated jph jph jph jph jph jph jph      PROM L shldr jph jph jph jph jph jph jph      Neuro Re-Ed                                                                                                        Ther Ex             Seated pec str             Doorway str             Scap retraction    GrnTB 3s x20         B shldr ER    GrnTB x20         B shldr extension    GrnTB x20         UBE f/r     1 5'e x3' total x1'e 2' total                                 Ther Activity                                       Gait Training                                       Modalities

## 2022-10-19 ENCOUNTER — OFFICE VISIT (OUTPATIENT)
Dept: PHYSICAL THERAPY | Facility: CLINIC | Age: 67
End: 2022-10-19
Payer: MEDICARE

## 2022-10-19 DIAGNOSIS — G89.29 CHRONIC LEFT SHOULDER PAIN: ICD-10-CM

## 2022-10-19 DIAGNOSIS — I89.0 LYMPHEDEMA: Primary | ICD-10-CM

## 2022-10-19 DIAGNOSIS — M25.512 CHRONIC LEFT SHOULDER PAIN: ICD-10-CM

## 2022-10-19 PROCEDURE — 97140 MANUAL THERAPY 1/> REGIONS: CPT | Performed by: PHYSICAL THERAPIST

## 2022-10-21 ENCOUNTER — OFFICE VISIT (OUTPATIENT)
Dept: PHYSICAL THERAPY | Facility: CLINIC | Age: 67
End: 2022-10-21
Payer: MEDICARE

## 2022-10-21 DIAGNOSIS — I89.0 LYMPHEDEMA: Primary | ICD-10-CM

## 2022-10-21 DIAGNOSIS — G89.29 CHRONIC LEFT SHOULDER PAIN: ICD-10-CM

## 2022-10-21 DIAGNOSIS — M25.512 CHRONIC LEFT SHOULDER PAIN: ICD-10-CM

## 2022-10-21 PROCEDURE — 97140 MANUAL THERAPY 1/> REGIONS: CPT | Performed by: PHYSICAL THERAPIST

## 2022-10-26 ENCOUNTER — OFFICE VISIT (OUTPATIENT)
Dept: PHYSICAL THERAPY | Facility: CLINIC | Age: 67
End: 2022-10-26
Payer: MEDICARE

## 2022-10-26 DIAGNOSIS — M25.512 CHRONIC LEFT SHOULDER PAIN: ICD-10-CM

## 2022-10-26 DIAGNOSIS — G89.29 CHRONIC LEFT SHOULDER PAIN: ICD-10-CM

## 2022-10-26 DIAGNOSIS — I89.0 LYMPHEDEMA: Primary | ICD-10-CM

## 2022-10-26 PROCEDURE — 97140 MANUAL THERAPY 1/> REGIONS: CPT | Performed by: PHYSICAL THERAPIST

## 2022-10-26 NOTE — PROGRESS NOTES
Daily Note     Today's date: 10/26/2022  Patient name: Lissy Bonilla  : 1955  MRN: [id number]  Referring provider: Thais Araujo MD  Dx:   Encounter Diagnosis     ICD-10-CM    1  Lymphedema  I89 0    2  Chronic left shoulder pain  M25 512     G89 29                   Subjective: Pt reports still a tension/PN thru posterior shldr and L upper arm that bothers her, questions PN mngmt and concerns around potential injections  Objective: See treatment diary below      Assessment: Tolerated treatment well  Patient would benefit from continued PT    Provided garment measurement forms to pt, completed, pt can order thru [address] - who she has used in the past   Pt responds well to manuals this day, still concern w/ tension and ttp along posterior shldr   Likely d/c NV  Plan: Continue per plan of care           Precautions:       Manuals  930 10-3 10-4 10-12 10-14 10-19 10- 10-26   MLD R UE jph jph jph jph jph jph jph jph jph jph   MLD L axilla + upper arm jph Filomena Lions jph [de-identified] jph jph   MFR L post shldr jph KBF GGG TSK VNP SGQ NSK LPB MTD AGG   R UE compression jph night jph night jph day jph day jph day jph night jph day jph day jph day jph   Gentle scap mobs B - seated jph jph jph jph jph jph jph jph jph jph   PROM L shldr jph jph jph jph jph jph jph jph jph jph   Neuro Re-Ed                                                                                                        Ther Ex             Seated pec str             Doorway str             Scap retraction    GrnTB 3s x20         B shldr ER    GrnTB x20         B shldr extension    GrnTB x20         UBE f/r     1 5'e x3' total x1'e 2' total  x1 5'e x3' total  2'e, 4' total                             Ther Activity                                       Gait Training                                       Modalities

## 2022-10-28 ENCOUNTER — OFFICE VISIT (OUTPATIENT)
Dept: PHYSICAL THERAPY | Facility: CLINIC | Age: 67
End: 2022-10-28
Payer: MEDICARE

## 2022-10-28 DIAGNOSIS — M25.512 CHRONIC LEFT SHOULDER PAIN: ICD-10-CM

## 2022-10-28 DIAGNOSIS — I89.0 LYMPHEDEMA: Primary | ICD-10-CM

## 2022-10-28 DIAGNOSIS — G89.29 CHRONIC LEFT SHOULDER PAIN: ICD-10-CM

## 2022-10-28 PROCEDURE — 97140 MANUAL THERAPY 1/> REGIONS: CPT | Performed by: PHYSICAL THERAPIST

## 2022-10-28 NOTE — PROGRESS NOTES
Daily Note + PT Discharge     Today's date: 10/28/2022  Patient name: Cade Sampson  : 1955  MRN: [id number]  Referring provider: Pedro Hickman MD  Dx:   Encounter Diagnosis     ICD-10-CM    1  Lymphedema  I89 0    2  Chronic left shoulder pain  M25 512     G89 29                   Subjective: Pt reports still some neck into L shldr issues, more discomfort in L upper arm for which she has PN mngmt f/u scheduled  Objective: See treatment diary below    Goals  ST  Pt to achieve girth reduction R upper arm within 3 wks-MET  2  Pt to maintain daily and nighttime garments within 1 wk-MET  3  Reduce PN L UE <3/10 2/ all activity within 3 wks-MET  LT  Fitted for new daytime compression garments within 6 wks-MET  2  Achieve R UE reduced girth measurements thru upper arm >10% within 5 wks-MET  3  Reduce PN <1/10 L UE within 6 wks-Not met  4  Improve postural strength 5/5 t/o within 6 wks-In progress    Assessment: Tolerated treatment well  D/c skilled PT at this time, plateau in [address] as measurements in R UE have reduced and plateued, fitted for new compression garments and order forms provided  Pt to manage  Pt I in phase 2 CDT protocol  Advised also a f/u w/ postural TE and stretching, reviewed HEP techniques to maintain  Plan: D/c skilled PT, goals achieved, f/u prn           Precautions:       Manuals 10-28      10-14 10-19 10-21 10-26   MLD R UE jph      jph jph jph jph   MLD L axilla + upper arm jph      jph jph jph jph   MFR L post shldr jph      jph jph jph jph   R UE compression jph      jph day jph day jph day jph   Gentle scap mobs B - seated jph      jph jph jph jph   PROM L shldr jph      jph jph jph jph   Neuro Re-Ed                                                                                                        Ther Ex             Seated pec str             Doorway str             Scap retraction             B shldr ER             B shldr extension             UBE f/r x2'e 4' total       x1 5'e x3' total  2'e, 4' total                             Ther Activity                                       Gait Training                                       Modalities

## 2023-02-06 ENCOUNTER — EVALUATION (OUTPATIENT)
Dept: PHYSICAL THERAPY | Facility: CLINIC | Age: 68
End: 2023-02-06

## 2023-02-06 DIAGNOSIS — S46.812A STRAIN OF LEFT TRAPEZIUS MUSCLE, INITIAL ENCOUNTER: ICD-10-CM

## 2023-02-06 DIAGNOSIS — M77.02 MEDIAL EPICONDYLITIS OF LEFT ELBOW: ICD-10-CM

## 2023-02-06 DIAGNOSIS — M77.8 TRICEPS TENDONITIS: ICD-10-CM

## 2023-02-06 DIAGNOSIS — I89.0 LYMPHEDEMA: Primary | ICD-10-CM

## 2023-02-06 NOTE — PROGRESS NOTES
PT Evaluation    Today's date: 2023  Patient name: Farhana Lees  : 1955  MRN: [id number]  Referring provider: Juan Carlos Ivan DO  Dx:   Encounter Diagnosis     ICD-10-CM    1  Lymphedema  I89 0       2  Triceps tendonitis  M77 8       3  Medial epicondylitis of left elbow  M77 02       4  Strain of left trapezius muscle, initial encounter  [id number]                    Subjective Evaluation    History of Present Illness    Subjective: Pt presents for examination stating her R arm has been pretty good, well managed w/ use of new garments previously fitted for  Citing a PN in L axilla cont'd as previously seen for but also PN in L c/s thru scalene and supraclavicular fossa regions, also PN along L medial upper arm  PN worse overnight, disrupts her sleep  Reaching or lifting items she ends up dropping items more frequently  Feels like  strength is reduced  Pt is R hand dominant but uses L UE more to mind her lymphedema in R UE  Pt has had mammogram in  in [place], [facility], also US L breast/axilla w/ indication for enlarged L axillary lymph node but no malignancy suggested  Pt admits a PN from medial aspect of elbow to axilla, also PN in superior aspect of L scapula  Pt has not been using her PN patches,  Does see PN management w/ oxycodone w/ acetaminophen  Denies use of HP/CP modalities  Pain  Current pain ratin  At best pain ratin  At worst pain ratin-8  Location: L axilla, L c/s ant and post + L upper arm- medially  Quality: dull ache, discomfort, squeezing, tight and pressure  Aggravating factors: reaching, overuse  Progression: worsening      Diagnostic Tests  X-ray - for obvious shldr pathology, c/s + for mild deg changes     Treatments  Previous treatment: physical therapy for lymphedema management  Patient Goals  Patient goals for therapy: independence with ADLs/IADLs, return to sport/leisure activities, decreased pain and decreased edema  Patient goal: Reduce R UE edema, address L UE PN       Goals  ST  Pt to maintain reduced volume of R upper arm over 6 wks  2  Pt to maintain daily and nighttime garments   3  Reduce PN L UE <4/10 w/ all activity within 3 wks  LT  Restore normal L UE ROM within 6 wks  2  Maintain R UE reduced girth measurements thru upper arm over 8 weeks +  3  Reduce PN <1/10 L UE within 6 wks  4  Improve postural strength 5/5 t/o within 6 wks      Objective:    Palpation   Moderate ttp L anterior aspect of shldr, bicipital groove, to medial epicondyle of elbow  PN pattern presents in similar pattern (from anterior L chest to medial elbow) as commonly seen in cording/MITCH  Moderate ttp L superior pole and border of scapula, medial aspect of scapula yields mild to mod ttp within soft tissue structures w/ moderate soft tissue restriction  Moderate point ttp medial epicondyle and just distal w/ mild restriction along flexor tendon pathways    Active Range of Motion     Additional Active Range of Motion Details  L shldr flexion 174 deg   L shldr abd 172 deg  L Shldr - cross over, + Mykel & Ayala for mild PN/guarding    Cervical spine screen:  ROM: Flexion limited 15% w/ soft tissue tightness   Extension limited 5% w/ mild PN c/o   SB R limited 35% w/ tightness and PN c/o   SB L limited 25% w/ PN c/o  Palpation:  Moderate soft tissue restriction t/o posterior shldr/periscapular musculature w/ active guarding realized, moderate soft tissue restriction of L>R scalenes into supraclavicular fossa region  No paraesthesia w/ palpation of c/s ROM assessment, pt does occasional c/o radiating PN thru L UE  Muscle Activation   L shldr strength 4/5 t/o  Mild guarding into flexion OH and abd w/ posturing fair to poor, Mod B rounded shldrs and fwd head   = B, assessed grossly, however L weakens w/ repetition and OH lifting       General Comments:    Physical assessment:  Palpation: Minimal heaviness R UE  Skin Mobility: Minimal restriction and fibrosis R upper arm  Skin color: WNL  Pitting: Minimal  Wound presence: none  Wound size: n/a  Wound color: n/a  Stemmer's Sign: +  Gait assessment: WNL, no AD  Transfer status:  WNL  Ability to don/doff clothing/garments: I          Flowsheet Rows      Most Recent Value   Index Finger                                                                                                                                                            8-10-22         2-6-22       R Index Finger Initial Girth  5 4 cm  V 5 5 cm RA (5 5cm) = +0 cm  (L 5 3cm) 5 1cm                                 5 4 cm           5 5 cm     Palmar Crease   R Palmar Crease Initial Girth  17 75 cm  V 17 5 cm (17 5) cm = 0 cm  (L 18 25 cm) 18 25cm                       17 4 cm         17 6 cm   Wrist   R Wrist Initial Girth  15 cm  V  14 75 cm (14 5)  = -0 25 cm  (L 14 25 cm) 14 25 cm                      14 75 cm        14 5 cm   W+10cm   R W+10cm Initial Girth  20 5 cm  V 20 5 cm (20 25) = -0 25 cm  (L 20 cm) 20 cm                               21 1 cm          20 6 cm   W+20cm   R W+20cm Initial Girth  23 25 cm  V 22 75 cm (22 5) = -0 25 cm  (L 22 5 cm) 22 25 cm                     23 5 cm          22 5 cm   W+30cm   R W+30cm Initial Girth  29 5 cm  V 28 25 cm (27 75) = -0 5 cm  (L 27 cm) 26 75 cm                          28 5 cm          28 5 cm   Elbow Joint   R Elbow Joint Initial Girth  22 75 cm  V 21 75 cm (22) = +0 25 cm  (L 22 cm) 22 cm                               22 5 cm          21 5 cm   W+40cm   R W+40cm Initial Girth  32 75 cm  V 33 5 cm (32 25) = -1 25 cm  (L 29 cm) 28 75 cm                        33 cm             32 4 cm        UPPER EXTREMITY RIGHT CALCULATIONS    Flowsheet Row Most Recent Value   Volume UE (mL) 2414   Difference from last visit (mL)  -2414   Difference from first visit (mL)  -2414        REFER TO FLOW SHEET FOR MEASUREMENTS (Added volumetrics this day but can see old measurements and comparison above)      Assessment:  Pt presents this day for further assessment of ongoing L UE and c/s PN, also screen of R UE lymphedema which is determined to be well managed by current girth and volumetric measurements, utilizing new compression garments  I recommend cont'd self management, phase 2 CDT protocol at this time for R UE  Pt had previously responded positively to skilled PT to address L upper quadrant and extremity PN but it appears as if sx's have again worsened and are likely further exacerbated by PMHx of chronic PN including fibromyalgia as pt has marked hypersensitivity to palpation t/o L posterior shldr, c/s, t/s into thorax/L axillary region  Guarding and significant myofascial restriction is noted  There is also significant scalene tightness and a ttp thru supraclavicular fossa for which I have instructed pt in self stretches for HEP routine this day  Skilled PT is advised to address soft tissue restrictions and associated functional deficits including limited c/s ROM, mild L shldr ROM deficit, and postural strength deficits         Plan  Patient would benefit from: skilled PT  Planned therapy interventions: manual therapy, massage, patient education, home exercise program and compression  Frequency: 2x week  Duration in visits: 10  Duration in weeks: 6  Plan of Care beginning date: 2/6/2023  Plan of Care expiration date: 3/20/2023  Treatment plan discussed with: patient         Precautions: Hx Breast CA, R UE lymphedema      Manuals 2-6                         Scalene MS NV            MFR L post shldr NV            C/s PROM NV            Gentle scap mobs B - seated NV            PROM L shldr NV            Neuro Re-Ed                                                                                                        Ther Ex 2-6            Seated pec str             Doorway str             Scap retraction 3s x20            Scalene stretches 10s x5e Ther Activity                                       Gait Training                                       Modalities

## 2023-02-06 NOTE — LETTER
2023    DO DESTINY Germain/Dennis Vega [place]    Patient: Enoch Posey   YOB: 1955   Date of Visit: 2023     Encounter Diagnosis     ICD-10-CM    1  Lymphedema  I89 0       2  Triceps tendonitis  M77 8       3  Medial epicondylitis of left elbow  M77 02       4  Strain of left trapezius muscle, initial encounter  [id number]           Dear Dr Hoda Yoon: Thank you for your recent referral of Enoch Posey  Please review the attached evaluation summary from Shonna's recent visit  Please verify that you agree with the plan of care by signing the attached order  If you have any questions or concerns, please do not hesitate to call  I sincerely appreciate the opportunity to share in the care of one of your patients and hope to have another opportunity to work with you in the near future  Sincerely,    Kiko Garcia, PT      Referring Provider:      I certify that I have read the below Plan of Care and certify the need for these services furnished under this plan of treatment while under my care  DO Bronson [phone] [id number]  Via Fax: [phone]          PT Evaluation    Today's date: 2023  Patient name: Enoch Posey  : 1955  MRN: [id number]  Referring provider: Ernestine Hector DO  Dx:   Encounter Diagnosis     ICD-10-CM    1  Lymphedema  I89 0       2  Triceps tendonitis  M77 8       3  Medial epicondylitis of left elbow  M77 02       4  Strain of left trapezius muscle, initial encounter  [id number]                    Subjective Evaluation    History of Present Illness    Subjective: Pt presents for examination stating her R arm has been pretty good, well managed w/ use of new garments previously fitted for    Citing a PN in L axilla cont'd as previously seen for but also PN in L c/s thru scalene and supraclavicular fossa regions, also PN along L medial upper arm   PN worse overnight, disrupts her sleep  Reaching or lifting items she ends up dropping items more frequently  Feels like  strength is reduced  Pt is R hand dominant but uses L UE more to mind her lymphedema in R UE  Pt has had mammogram in  in [place], neg, also US L breast/axilla w/ indication for enlarged L axillary lymph node but no malignancy suggested  Pt admits a PN from medial aspect of elbow to axilla, also PN in superior aspect of L scapula  Pt has not been using her PN patches,  Does see PN management w/ oxycodone w/ acetaminophen  Denies use of HP/CP modalities  Pain  Current pain ratin  At best pain ratin  At worst pain ratin-8  Location: L axilla, L c/s ant and post + L upper arm- medially  Quality: dull ache, discomfort, squeezing, tight and pressure  Aggravating factors: reaching, overuse  Progression: worsening      Diagnostic Tests  X-ray - for obvious shldr pathology, c/s + for mild deg changes  Treatments  Previous treatment: physical therapy for lymphedema management  Patient Goals  Patient goals for therapy: independence with ADLs/IADLs, return to sport/leisure activities, decreased pain and decreased edema  Patient goal: Reduce R UE edema, address L UE PN       Goals  ST  Pt to maintain reduced volume of R upper arm over 6 wks  2  Pt to maintain daily and nighttime garments   3  Reduce PN L UE <4/10 w/ all activity within 3 wks  LT  Restore normal L UE ROM within 6 wks  2  Maintain R UE reduced girth measurements thru upper arm over 8 weeks +  3  Reduce PN <1/10 L UE within 6 wks  4  Improve postural strength 5/5 t/o within 6 wks      Objective:    Palpation   Moderate ttp L anterior aspect of shldr, bicipital groove, to medial epicondyle of elbow  PN pattern presents in similar pattern (from anterior L chest to medial elbow) as commonly seen in cording/MITCH     Moderate ttp L superior pole and border of scapula, medial aspect of scapula yields mild to mod ttp within soft tissue structures w/ moderate soft tissue restriction  Moderate point ttp medial epicondyle and just distal w/ mild restriction along flexor tendon pathways    Active Range of Motion     Additional Active Range of Motion Details  L shldr flexion 174 deg   L shldr abd 172 deg  L Shldr - cross over, + Mykel & Ayala for mild PN/guarding    Cervical spine screen:  ROM: Flexion limited 15% w/ soft tissue tightness   Extension limited 5% w/ mild PN c/o   SB R limited 35% w/ tightness and PN c/o   SB L limited 25% w/ PN c/o  Palpation:  Moderate soft tissue restriction t/o posterior shldr/periscapular musculature w/ active guarding realized, moderate soft tissue restriction of L>R scalenes into supraclavicular fossa region  No paraesthesia w/ palpation of c/s ROM assessment, pt does occasional c/o radiating PN thru L UE  Muscle Activation   L shldr strength 4/5 t/o  Mild guarding into flexion OH and abd w/ posturing fair to poor, Mod B rounded shldrs and fwd head   = B, assessed grossly, however L weakens w/ repetition and OH lifting       General Comments:    Physical assessment:  Palpation: Minimal heaviness R UE  Skin Mobility: Minimal restriction and fibrosis R upper arm  Skin color: WNL  Pitting: Minimal  Wound presence: none  Wound size: n/a  Wound color: n/a  Stemmer's Sign: +  Gait assessment: WNL, no AD  Transfer status:  WNL  Ability to don/doff clothing/garments: I          Flowsheet Rows      Most Recent Value   Index Finger                                                                                                                                                            8-10-22         2-6-22       R Index Finger Initial Girth  5 4 cm  V 5 5 cm RA (5 5cm) = +0 cm  (L 5 3cm) 5 1cm                                 5 4 cm           5 5 cm     Palmar Crease   R Palmar Crease Initial Girth  17 75 cm  V 17 5 cm (17 5) cm = 0 cm  (L 18 25 cm) 18 25cm 17 4 cm         17 6 cm   Wrist   R Wrist Initial Girth  15 cm  V  14 75 cm (14 5)  = -0 25 cm  (L 14 25 cm) 14 25 cm                      14 75 cm        14 5 cm   W+10cm   R W+10cm Initial Girth  20 5 cm  V 20 5 cm (20 25) = -0 25 cm  (L 20 cm) 20 cm                               21 1 cm          20 6 cm   W+20cm   R W+20cm Initial Girth  23 25 cm  V 22 75 cm (22 5) = -0 25 cm  (L 22 5 cm) 22 25 cm                     23 5 cm          22 5 cm   W+30cm   R W+30cm Initial Girth  29 5 cm  V 28 25 cm (27 75) = -0 5 cm  (L 27 cm) 26 75 cm                          28 5 cm          28 5 cm   Elbow Joint   R Elbow Joint Initial Girth  22 75 cm  V 21 75 cm (22) = +0 25 cm  (L 22 cm) 22 cm                               22 5 cm          21 5 cm   W+40cm   R W+40cm Initial Girth  32 75 cm  V 33 5 cm (32 25) = -1 25 cm  (L 29 cm) 28 75 cm                        33 cm             32 4 cm        UPPER EXTREMITY RIGHT CALCULATIONS    Flowsheet Row Most Recent Value   Volume UE (mL) 2414   Difference from last visit (mL)  -2414   Difference from first visit (mL)  -2414        REFER TO FLOW SHEET FOR MEASUREMENTS (Added volumetrics this day but can see old measurements and comparison above)      Assessment:  Pt presents this day for further assessment of ongoing L UE and c/s PN, also screen of R UE lymphedema which is determined to be well managed by current girth and volumetric measurements, utilizing new compression garments  I recommend cont'd self management, phase 2 CDT protocol at this time for R UE  Pt had previously responded positively to skilled PT to address L upper quadrant and extremity PN but it appears as if sx's have again worsened and are likely further exacerbated by PMHx of chronic PN including fibromyalgia as pt has marked hypersensitivity to palpation t/o L posterior shldr, c/s, t/s into thorax/L axillary region  Guarding and significant myofascial restriction is noted    There is also significant scalene tightness and a ttp thru supraclavicular fossa for which I have instructed pt in self stretches for HEP routine this day  Skilled PT is advised to address soft tissue restrictions and associated functional deficits including limited c/s ROM, mild L shldr ROM deficit, and postural strength deficits         Plan  Patient would benefit from: skilled PT  Planned therapy interventions: manual therapy, massage, patient education, home exercise program and compression  Frequency: 2x week  Duration in visits: 10  Duration in weeks: 6  Plan of Care beginning date: 2/6/2023  Plan of Care expiration date: 3/20/2023  Treatment plan discussed with: patient        Precautions: Hx Breast CA, R UE lymphedema      Manuals 2-6                         Scalene MS NV            MFR L post shldr NV            C/s PROM NV            Gentle scap mobs B - seated NV            PROM L shldr NV            Neuro Re-Ed                                                                                                        Ther Ex 2-6            Seated pec str             Doorway str             Scap retraction 3s x20            Scalene stretches 10s x5e                                                                Ther Activity                                       Gait Training                                       Modalities

## 2023-02-08 ENCOUNTER — OFFICE VISIT (OUTPATIENT)
Dept: PHYSICAL THERAPY | Facility: CLINIC | Age: 68
End: 2023-02-08

## 2023-02-08 DIAGNOSIS — S46.812A STRAIN OF LEFT TRAPEZIUS MUSCLE, INITIAL ENCOUNTER: ICD-10-CM

## 2023-02-08 DIAGNOSIS — M77.02 MEDIAL EPICONDYLITIS OF LEFT ELBOW: ICD-10-CM

## 2023-02-08 DIAGNOSIS — M77.8 TRICEPS TENDONITIS: ICD-10-CM

## 2023-02-08 DIAGNOSIS — I89.0 LYMPHEDEMA: Primary | ICD-10-CM

## 2023-02-08 NOTE — PROGRESS NOTES
Daily Note     Today's date: 2023  Patient name: Bere Patel  : 1955  MRN: [id number]  Referring provider: Breanna Zaldivar DO  Dx:   Encounter Diagnosis     ICD-10-CM    1  Lymphedema  I89 0       2  Triceps tendonitis  M77 8       3  Medial epicondylitis of left elbow  M77 02       4  Strain of left trapezius muscle, initial encounter  [id number]                      Subjective: Pt reports she initiated her HEP but did less reps as she struggled a bit with it  7/10 PN when waking this AM, 5/10 at present, Rx medication taken this AM        Objective: See treatment diary below      Assessment: Tolerated treatment well  Patient would benefit from continued PT  Mild progression to TE this day, will gradually increase as able  Emphasis to manuals this day w/ marked hypersensitivity realized thru posterior shldr to c/s regions, most sensitive in sub-occipital region and again into scalenes thru supraclavicular fossa  Gains in c/s ROM seen w/ PROM, mild guarding to L shldr PROM  Plan: Continue per plan of care        Precautions: Hx Breast CA, R UE lymphedema      Manuals 2-6 2-8                        Scalene MS NV jph           MFR L post shldr NV jph           C/s PROM NV jph           Gentle scap mobs B - seated NV jph           PROM L shldr NV jph           Neuro Re-Ed                                                                                                        Ther Ex 2-6 2-8           Seated pec str  5s x5           Doorway str             Scap retraction 3s x20 3s x30           Scalene stretches 10s x5e 10s x5e           Pulleys  x3'                                                  Ther Activity                                       Gait Training                                       Modalities

## 2023-02-13 ENCOUNTER — OFFICE VISIT (OUTPATIENT)
Dept: PHYSICAL THERAPY | Facility: CLINIC | Age: 68
End: 2023-02-13

## 2023-02-13 DIAGNOSIS — S46.812A STRAIN OF LEFT TRAPEZIUS MUSCLE, INITIAL ENCOUNTER: ICD-10-CM

## 2023-02-13 DIAGNOSIS — I89.0 LYMPHEDEMA: Primary | ICD-10-CM

## 2023-02-13 DIAGNOSIS — M77.02 MEDIAL EPICONDYLITIS OF LEFT ELBOW: ICD-10-CM

## 2023-02-13 DIAGNOSIS — M77.8 TRICEPS TENDONITIS: ICD-10-CM

## 2023-02-13 NOTE — PROGRESS NOTES
Daily Note     Today's date: 2023  Patient name: Enoch Posey  : 1955  MRN: [id number]  Referring provider: Ernestine Hector DO  Dx:   Encounter Diagnosis     ICD-10-CM    1  Lymphedema  I89 0       2  Triceps tendonitis  M77 8       3  Medial epicondylitis of left elbow  M77 02       4  Strain of left trapezius muscle, initial encounter  [id number]                      Subjective: Pt reports PN this AM 6/10 when waking, 10 now  Reports PN can get more sig at night  Objective: See treatment diary below      Assessment: Tolerated treatment well  Patient would benefit from continued PT  Less ttp t/o posterior aspect of shldr this day, still sensitive in region of scalenes, less guarding in sub-occipitals w/ manuals this day  Able to advance c/s ROM SB this day passively w/ fair tolerance  Encouraged to cont HEP to tolerance, avoid overstretching  Plan: Continue per plan of care        Precautions: Hx Breast CA, R UE lymphedema      Manuals 2-6 2-8 2-13                       Scalene MS NV jph jph          MFR L post shldr NV jph jph          C/s PROM NV jph jph          Gentle scap mobs B - seated NV jph jph          PROM L shldr NV jph jph          Neuro Re-Ed                                                                                                        Ther Ex 2-6 2-8 2-13          Seated pec str  5s x5 5s x5          Doorway str   10s x3e          Scap retraction 3s x20 3s x30 3s x30          Scalene stretches 10s x5e 10s x5e 10s x5e          Pulleys  x3' x3'                                                 Ther Activity                                       Gait Training                                       Modalities

## 2023-02-17 ENCOUNTER — OFFICE VISIT (OUTPATIENT)
Dept: PHYSICAL THERAPY | Facility: CLINIC | Age: 68
End: 2023-02-17

## 2023-02-17 DIAGNOSIS — I89.0 LYMPHEDEMA: Primary | ICD-10-CM

## 2023-02-17 DIAGNOSIS — M77.8 TRICEPS TENDONITIS: ICD-10-CM

## 2023-02-17 DIAGNOSIS — M77.02 MEDIAL EPICONDYLITIS OF LEFT ELBOW: ICD-10-CM

## 2023-02-17 DIAGNOSIS — S46.812A STRAIN OF LEFT TRAPEZIUS MUSCLE, INITIAL ENCOUNTER: ICD-10-CM

## 2023-02-17 NOTE — PROGRESS NOTES
Daily Note     Today's date: 2023  Patient name: Pernell Garcia  : 1955  MRN: [id number]  Referring provider: Marita Whitehead DO  Dx:   Encounter Diagnosis     ICD-10-CM    1  Lymphedema  I89 0       2  Triceps tendonitis  M77 8       3  Medial epicondylitis of left elbow  M77 02       4  Strain of left trapezius muscle, initial encounter  [id number]                       Subjective: Pt reports slow going this morning citing abdominal spams and does report she saw PN mngmt yesterday, reports she want to avoid injections, happier to cont therapy, utilize Rx medication and Voltaren  7/10 PN c/o  Objective: See treatment diary below      Assessment: Tolerated treatment well  Patient would benefit from continued PT 5/10 post session, better tolerance to manuals this day w/ still some guarding but seeing slight reductions in tightness of scalenes and t/o posterior shldr/periscapular regions  Plan: Continue per plan of care        Precautions: Hx Breast CA, R UE lymphedema      Manuals 2-6 2-8 2-13 2-17                      Scalene MS NV jph jph jph         MFR L post shldr NV jph jph jph         C/s PROM NV jph jph jph         Gentle scap mobs B - seated NV jph jph jph         PROM L shldr NV jph jph jph         Neuro Re-Ed                                                                                                        Ther Ex 2-6 2-8 2-13 2-17         Seated pec str  5s x5 5s x5 10s x5         Doorway str   10s x3e 10s x5         Scap retraction 3s x20 3s x30 3s x30 GrnTB 3s x30         Scalene stretches 10s x5e 10s x5e 10s x5e 10s x5e         Pulleys  x3' x3' x4'         TB tricep extension    GrnT tube x30                                   Ther Activity                                       Gait Training                                       Modalities

## 2023-02-21 ENCOUNTER — APPOINTMENT (OUTPATIENT)
Dept: PHYSICAL THERAPY | Facility: CLINIC | Age: 68
End: 2023-02-21

## 2023-02-24 ENCOUNTER — OFFICE VISIT (OUTPATIENT)
Dept: PHYSICAL THERAPY | Facility: CLINIC | Age: 68
End: 2023-02-24

## 2023-02-24 DIAGNOSIS — I89.0 LYMPHEDEMA: Primary | ICD-10-CM

## 2023-02-24 DIAGNOSIS — M77.02 MEDIAL EPICONDYLITIS OF LEFT ELBOW: ICD-10-CM

## 2023-02-24 DIAGNOSIS — M77.8 TRICEPS TENDONITIS: ICD-10-CM

## 2023-02-24 DIAGNOSIS — S46.812A STRAIN OF LEFT TRAPEZIUS MUSCLE, INITIAL ENCOUNTER: ICD-10-CM

## 2023-02-24 NOTE — PROGRESS NOTES
Daily Note     Today's date: 2023  Patient name: Susy Pacheco  : 1955  MRN: [id number]  Referring provider: Brent Grimm DO  Dx:   Encounter Diagnosis     ICD-10-CM    1  Lymphedema  I89 0       2  Triceps tendonitis  M77 8       3  Medial epicondylitis of left elbow  M77 02       4  Strain of left trapezius muscle, initial encounter  [id number]                       Subjective: Pt reports she was ill the other day causing her to miss appt  Today her PN is about a 5/10, a bit better since staring therapy  Points predominantly to axilla for center of PN  Pt reports neck is a 3/10  Objective: See treatment diary below      Assessment: Tolerated treatment well  Patient would benefit from continued PT Pt responds well to manuals, still some hypersensitivity t/o posterior shldr to c/s region into suboccipitals but less guarded and reduced restriction t/o soft tissue  ROM of L shldr and c/s improving  TE yields mod fatigue  Plan: Continue per plan of care        Precautions: Hx Breast CA, R UE lymphedema      Manuals 2-6 2-8 2-13 2-17 2-24                     Scalene MS NV jph jph jph jph        MFR L post shldr NV jph jph jph jph        C/s PROM NV jph jph jph jph        Gentle scap mobs B - seated NV jph jph jph jph        PROM L shldr NV jph jph jph jph        Neuro Re-Ed                                                                                                        Ther Ex 2-6 2-8 2-13 2-17 2-24        Seated pec str  5s x5 5s x5 10s x5 10s x5        Doorway str   10s x3e 10s x5 10s x5        Scap retraction 3s x20 3s x30 3s x30 GrnTB 3s x30 GrnTB 3s x30        Scalene stretches 10s x5e 10s x5e 10s x5e 10s x5e 10s x5e        Pulleys  x3' x3' x4' x4'        TB tricep extension    GrnT tube x30 GrnT tube x30        B shldr extension     GrnTB x15                     Ther Activity                                       Gait Training                                       Modalities

## 2023-02-28 ENCOUNTER — OFFICE VISIT (OUTPATIENT)
Dept: PHYSICAL THERAPY | Facility: CLINIC | Age: 68
End: 2023-02-28

## 2023-02-28 DIAGNOSIS — I89.0 LYMPHEDEMA: Primary | ICD-10-CM

## 2023-02-28 DIAGNOSIS — S46.812A STRAIN OF LEFT TRAPEZIUS MUSCLE, INITIAL ENCOUNTER: ICD-10-CM

## 2023-02-28 DIAGNOSIS — M77.8 TRICEPS TENDONITIS: ICD-10-CM

## 2023-02-28 DIAGNOSIS — M77.02 MEDIAL EPICONDYLITIS OF LEFT ELBOW: ICD-10-CM

## 2023-02-28 NOTE — PROGRESS NOTES
Daily Note     Today's date: 2023  Patient name: Janie Cameron  : 1955  MRN: [id number]  Referring provider: Jose Luis Gore DO  Dx:   Encounter Diagnosis     ICD-10-CM    1  Lymphedema  I89 0       2  Triceps tendonitis  M77 8       3  Medial epicondylitis of left elbow  M77 02       4  Strain of left trapezius muscle, initial encounter  [id number]                       Subjective: Pt reports a bit better sleep last night, /10 PN this AM, "not too bad today"  Does c/o abdominal spasms for some time now that bother her  Does cont w/ Oxy Rx medication, was a 6/10 yesterday  Objective: See treatment diary below      Assessment: Tolerated treatment well  Patient would benefit from continued PT UBE added today w/ + response to TE progression, some fatigue w/ increase in TB resistance  Sensitive to c/s PROM, limited into SB motion, able to achieve greater c/s facet mobility w/ manuals this day and less ttp thru sub-occipitals noted  Gradual progress being seen w/ skilled care, encouraged cont'd streching at home to advance shldr and c/s ROM  Plan: Continue per plan of care        Precautions: Hx Breast CA, R UE lymphedema      Manuals 2-6 2-8 2-13 2-17 2-24 2-28                    Scalene MS NV jph jph jph jph jph       MFR L post shldr NV jph jph jph jph jph       C/s PROM NV jph jph jph jph jph       Gentle scap mobs B - seated NV jph jph jph jph jph       PROM L shldr NV jph jph jph jph jph       Neuro Re-Ed                                                                                                        Ther Ex 2-6 2-8 2-13 2-17 2-24 2-28       Seated pec str  5s x5 5s x5 10s x5 10s x5 10s x5       Doorway str   10s x3e 10s x5 10s x5 10s x5       Scap retraction 3s x20 3s x30 3s x30 GrnTB 3s x30 GrnTB 3s x30 BlueTB 3s x30       Scalene stretches 10s x5e 10s x5e 10s x5e 10s x5e 10s x5e 10s x5       Pulleys  x3' x3' x4' x4' x5'       TB tricep extension    GrnT tube x30 GrnT tube x30 BlueTB x20       B shldr extension     GrnTB x15 BlueTB x20       UBE fwd/rev      x2'e                                              Ther Activity                                       Gait Training                                       Modalities

## 2023-03-02 ENCOUNTER — OFFICE VISIT (OUTPATIENT)
Dept: PHYSICAL THERAPY | Facility: CLINIC | Age: 68
End: 2023-03-02

## 2023-03-02 DIAGNOSIS — M77.02 MEDIAL EPICONDYLITIS OF LEFT ELBOW: ICD-10-CM

## 2023-03-02 DIAGNOSIS — S46.812A STRAIN OF LEFT TRAPEZIUS MUSCLE, INITIAL ENCOUNTER: ICD-10-CM

## 2023-03-02 DIAGNOSIS — I89.0 LYMPHEDEMA: Primary | ICD-10-CM

## 2023-03-02 DIAGNOSIS — M77.8 TRICEPS TENDONITIS: ICD-10-CM

## 2023-03-02 NOTE — PROGRESS NOTES
Daily Note     Today's date: 3/2/2023  Patient name: Nay Katz  : 1955  MRN: [id number]  Referring provider: Keshia Campa DO  Dx:   Encounter Diagnosis     ICD-10-CM    1  Lymphedema  I89 0       2  Triceps tendonitis  M77 8       3  Medial epicondylitis of left elbow  M77 02       4  Strain of left trapezius muscle, initial encounter  [id number]                       Subjective: Pt presents today, tearful, upset regarding some personal matters creating more tension and stress  Requests to cut session short today  Objective: See treatment diary below      Assessment: Tolerated treatment well  Patient would benefit from continued PT Held TE this day in favor of manuals w/ good melvina today, less guarding and reduced trigger points this day, a general tension seen t/o B posterior shldrs and c/s reduced some w/ manuals  I am seeing gains in shldr ROM and c/s motion  Plan: Continue per plan of care         Precautions: Hx Breast CA, R UE lymphedema      Manuals 2-6 2-8 2-13 2-17 2-24 2-28 3-2                   Scalene MS NV jph jph jph jph jph jph      MFR L post shldr NV jph jph jph jph jph jph      C/s PROM NV jph jph jph jph jph jph      Gentle scap mobs B - seated NV jph jph jph jph jph jph      PROM L shldr NV jph jph jph jph jph jph      Neuro Re-Ed                                                                                                        Ther Ex 2-6 2-8 2-13 2-17 2-24 2-28 3-2      Seated pec str  5s x5 5s x5 10s x5 10s x5 10s x5       Doorway str   10s x3e 10s x5 10s x5 10s x5       Scap retraction 3s x20 3s x30 3s x30 GrnTB 3s x30 GrnTB 3s x30 BlueTB 3s x30       Scalene stretches 10s x5e 10s x5e 10s x5e 10s x5e 10s x5e 10s x5       Pulleys  x3' x3' x4' x4' x5'       TB tricep extension    GrnT tube x30 GrnT tube x30 BlueTB x20       B shldr extension     GrnTB x15 BlueTB x20       UBE fwd/rev      x2'e                                              Ther Activity Gait Training                                       Modalities

## 2023-03-07 ENCOUNTER — APPOINTMENT (OUTPATIENT)
Dept: PHYSICAL THERAPY | Facility: CLINIC | Age: 68
End: 2023-03-07

## 2023-03-09 ENCOUNTER — APPOINTMENT (OUTPATIENT)
Dept: PHYSICAL THERAPY | Facility: CLINIC | Age: 68
End: 2023-03-09

## 2023-03-14 ENCOUNTER — OFFICE VISIT (OUTPATIENT)
Dept: PHYSICAL THERAPY | Facility: CLINIC | Age: 68
End: 2023-03-14

## 2023-03-14 DIAGNOSIS — S46.812A STRAIN OF LEFT TRAPEZIUS MUSCLE, INITIAL ENCOUNTER: ICD-10-CM

## 2023-03-14 DIAGNOSIS — M77.8 TRICEPS TENDONITIS: ICD-10-CM

## 2023-03-14 DIAGNOSIS — I89.0 LYMPHEDEMA: Primary | ICD-10-CM

## 2023-03-14 DIAGNOSIS — M77.02 MEDIAL EPICONDYLITIS OF LEFT ELBOW: ICD-10-CM

## 2023-03-14 NOTE — PROGRESS NOTES
Daily Note     Today's date: 3/14/2023  Patient name: Sophia Polk  : 1955  MRN: [id number]  Referring provider: Annamaria Macias DO  Dx:   Encounter Diagnosis     ICD-10-CM    1  Lymphedema  I89 0       2  Triceps tendonitis  M77 8       3  Medial epicondylitis of left elbow  M77 02       4  Strain of left trapezius muscle, initial encounter  [id number]                       Subjective: Pt reports increased PN/sorenes in her neck into scapular region after a lengthy driving trip  PN rated 6/10 this day  Objective: See treatment diary below      Assessment: Tolerated treatment well  Patient would benefit from continued PT Cont to hold most TE this day in favor of manuals w/ good PROM L shldr and significantly reduced tension t/o B posterior shldrs w/ greater melvina to palpation thru c/s and shldr regions  PROM of c/s remains guarded however, especially into SB motions w/ cont'd limitation present  Pt to see PN mngmt Thur  Plan: Continue per plan of care         Precautions: Hx Breast CA, R UE lymphedema      Manuals 2-6 2-8 2-13 2-17 2-24 2-28 3-2 3-14                  Scalene MS NV jph jph jph jph jph jph jph     MFR L post shldr NV jph jph jph jph jph jph jph     C/s PROM NV jph jph jph jph jph jph jph     Gentle scap mobs B - seated NV jph jph jph jph jph jph jph     PROM L shldr NV jph jph jph jph jph jph jph     Neuro Re-Ed                                                                                                        Ther Ex 2-6 2-8 2-13 2-17 2-24 2-28 3-2 3-14     Seated pec str  5s x5 5s x5 10s x5 10s x5 10s x5       Doorway str   10s x3e 10s x5 10s x5 10s x5       Scap retraction 3s x20 3s x30 3s x30 GrnTB 3s x30 GrnTB 3s x30 BlueTB 3s x30       Scalene stretches 10s x5e 10s x5e 10s x5e 10s x5e 10s x5e 10s x5       Pulleys  x3' x3' x4' x4' x5'       TB tricep extension    GrnT tube x30 GrnT tube x30 BlueTB x20       B shldr extension     GrnTB x15 BlueTB x20       UBE fwd/rev      x2'e x2'e                                            Ther Activity                                       Gait Training                                       Modalities

## 2023-03-15 ENCOUNTER — OFFICE VISIT (OUTPATIENT)
Dept: PHYSICAL THERAPY | Facility: CLINIC | Age: 68
End: 2023-03-15

## 2023-03-15 DIAGNOSIS — I89.0 LYMPHEDEMA: Primary | ICD-10-CM

## 2023-03-15 DIAGNOSIS — S46.812A STRAIN OF LEFT TRAPEZIUS MUSCLE, INITIAL ENCOUNTER: ICD-10-CM

## 2023-03-15 DIAGNOSIS — M77.02 MEDIAL EPICONDYLITIS OF LEFT ELBOW: ICD-10-CM

## 2023-03-15 DIAGNOSIS — M77.8 TRICEPS TENDONITIS: ICD-10-CM

## 2023-03-15 NOTE — PROGRESS NOTES
Daily Note     Today's date: 3/15/2023  Patient name: Luci Llamas  : 1955  MRN: [id number]  Referring provider: Jose Alejandro Bravo DO  Dx:   Encounter Diagnosis     ICD-10-CM    1  Lymphedema  I89 0       2  Triceps tendonitis  M77 8       3  Medial epicondylitis of left elbow  M77 02       4  Strain of left trapezius muscle, initial encounter  [id number]                       Subjective: Pt reports she has a bit less PN this day, yesterday was very fatigued, but today is a bit better  Objective: See treatment diary below      Assessment: Tolerated treatment well  Patient would benefit from continued PT Resumed some TE this day as pt has a bit more energy and better able to tolerate  Significantly reduced tension t/o B posterior shldrs w/ greater melvina to palpation thru c/s and shldr regions  PROM of c/s guarded initially but improved SB L and R   L shldr ROM much more fluid and WNL's  Pt to consult w/ PN mngmt tomorrow and will determine appropriate course of action pending results from that consult  Plan: Continue per plan of care         Precautions: Hx Breast CA, R UE lymphedema      Manuals 2-6 2-8 2-13 2-17 2-24 2-28 3-2 3-14 3-15                 Scalene MS NV jph jph jph jph jph jph jph jph    MFR L post shldr NV jph jph jph jph jph jph jph jph    C/s PROM NV jph jph jph jph jph jph jph jph    Gentle scap mobs B - seated NV jph jph jph jph jph jph jph jph    PROM L shldr NV jph jph jph jph jph jph jph jph    Neuro Re-Ed                                                                                                        Ther Ex 2-6 2-8 2-13 2-17 2-24 2-28 3-2 3-14 3-15    Seated pec str  5s x5 5s x5 10s x5 10s x5 10s x5       Doorway str   10s x3e 10s x5 10s x5 10s x5       Scap retraction 3s x20 3s x30 3s x30 GrnTB 3s x30 GrnTB 3s x30 BlueTB 3s x30   BlueTB 3s x20    Scalene stretches 10s x5e 10s x5e 10s x5e 10s x5e 10s x5e 10s x5       Pulleys  x3' x3' x4' x4' x5'   x4'    TB tricep extension GrnT tube x30 GrnT tube x30 BlueTB x20   BlueTB x20    B shldr extension     GrnTB x15 BlueTB x20       UBE fwd/rev      x2'e  x2'e x2'e                                           Ther Activity                                       Gait Training                                       Modalities

## 2023-11-22 ENCOUNTER — APPOINTMENT (OUTPATIENT)
Dept: RADIOLOGY | Facility: CLINIC | Age: 68
End: 2023-11-22
Payer: MEDICARE

## 2023-11-22 ENCOUNTER — TELEPHONE (OUTPATIENT)
Dept: URGENT CARE | Facility: CLINIC | Age: 68
End: 2023-11-22

## 2023-11-22 ENCOUNTER — OFFICE VISIT (OUTPATIENT)
Dept: URGENT CARE | Facility: CLINIC | Age: 68
End: 2023-11-22
Payer: MEDICARE

## 2023-11-22 VITALS
HEART RATE: 83 BPM | SYSTOLIC BLOOD PRESSURE: 103 MMHG | HEIGHT: 61 IN | TEMPERATURE: 97.6 F | BODY MASS INDEX: 22.28 KG/M2 | DIASTOLIC BLOOD PRESSURE: 73 MMHG | RESPIRATION RATE: 16 BRPM | WEIGHT: 118 LBS | OXYGEN SATURATION: 98 %

## 2023-11-22 DIAGNOSIS — R06.02 SOB (SHORTNESS OF BREATH): ICD-10-CM

## 2023-11-22 DIAGNOSIS — R06.02 SOB (SHORTNESS OF BREATH): Primary | ICD-10-CM

## 2023-11-22 DIAGNOSIS — J32.9 SINOBRONCHITIS: ICD-10-CM

## 2023-11-22 DIAGNOSIS — J40 SINOBRONCHITIS: ICD-10-CM

## 2023-11-22 PROCEDURE — 71046 X-RAY EXAM CHEST 2 VIEWS: CPT

## 2023-11-22 PROCEDURE — 99213 OFFICE O/P EST LOW 20 MIN: CPT

## 2023-11-22 PROCEDURE — G0463 HOSPITAL OUTPT CLINIC VISIT: HCPCS

## 2023-11-22 RX ORDER — DICLOFENAC SODIUM 75 MG/1
75 TABLET, DELAYED RELEASE ORAL 2 TIMES DAILY PRN
COMMUNITY
Start: 2023-11-06

## 2023-11-22 RX ORDER — METHYLPREDNISOLONE 4 MG/1
TABLET ORAL
COMMUNITY
Start: 2023-11-18

## 2023-11-22 RX ORDER — BACLOFEN 10 MG/1
10 TABLET ORAL 2 TIMES DAILY PRN
COMMUNITY
Start: 2023-11-06

## 2023-11-22 RX ORDER — INDOMETHACIN 25 MG/1
CAPSULE ORAL
COMMUNITY
Start: 2023-10-12

## 2023-11-22 RX ORDER — AMOXICILLIN 875 MG/1
875 TABLET, COATED ORAL 2 TIMES DAILY
Qty: 14 TABLET | Refills: 0 | Status: SHIPPED | OUTPATIENT
Start: 2023-11-22 | End: 2023-11-29

## 2023-11-22 RX ORDER — OXYCODONE HYDROCHLORIDE 5 MG/1
5 TABLET ORAL EVERY 6 HOURS PRN
COMMUNITY
Start: 2023-10-12

## 2023-11-22 RX ORDER — SODIUM FLUORIDE 6.1 MG/ML
GEL, DENTIFRICE DENTAL
COMMUNITY
Start: 2023-10-17

## 2023-11-22 NOTE — TELEPHONE ENCOUNTER
Spoke with patient and gave final chest x-ray report from radiologist.  Also noted radiologist suggestion about enrolling in lung cancer screening. Patient may see details of that on the bottom of her report and may discuss with her primary care provider. She expressed understanding.

## 2023-11-22 NOTE — PROGRESS NOTES
[facility]    NAME: Michael Tuttle is a 70 y.o. female  : 1955    MRN: [id number]  DATE: 2023  TIME: 9:16 AM    Assessment and Plan   SOB (shortness of breath) [R06.02]  1. SOB (shortness of breath)  XR chest pa & lateral      2. Sinobronchitis  amoxicillin (AMOXIL) 875 mg tablet        Chest x-ray: No acute infiltrates. Await radiologist final test results. Patient Instructions     Patient Instructions   Continue and finish prednisone that was given to you by the other urgent care. Take antibiotic as instructed. May benefit from nasal saline lavages or nasal saline spray every couple hours to clear sinuses. May benefit from cough expectorant such as plain Mucinex. Stay well-hydrated. Vaporizer by the bedside may be helpful. Follow-up with your primary care at upcoming appointment. If you would develop significant worsening symptoms with increased shortness of breath, chest pain or weakness proceed immediately to emergency room for further evaluation. Chief Complaint     Chief Complaint   Patient presents with    Cough     Patient states that she has anxiety and is nervous about cough. She was just seen at [facility] urgent care on the  and they gave her steroids for the cough but she feels she needs a CXR. She is also seeing PCP on Friday for recheck. History of Present Illness   Michael Tuttle presents to the clinic c/o  39-year-old female comes in with persistent cough, nasal congestion, drainage, tightness in chest and says she is short of breath and has dyspnea on exertion. She says she is bringing up a lot of phlegm. This started a couple weeks ago with sore throat, fatigue, sinus pain and pressure. Sore throat is slightly improved but has not resolved. Getting a lot of phlegm up when she coughs. She was seen on  at an urgent care and placed on Medrol Dosepak. She is to see her primary care provider on Friday.     Patient is concerned about possible pneumonia. Says she just lost her brother recently. She says she has a lot of anxiety. Review of Systems   Review of Systems   Constitutional:  Positive for activity change, appetite change and fatigue. Negative for chills and fever. HENT:  Positive for congestion, postnasal drip, rhinorrhea and sinus pressure. Negative for ear discharge, ear pain, sinus pain and sore throat. Eyes: Negative. Respiratory:  Positive for cough. Negative for chest tightness, shortness of breath and wheezing. Cardiovascular: Negative. Hematological: Negative. Current Medications     Long-Term Medications   Medication Sig Dispense Refill    ALPRAZolam (XANAX) 0.25 mg tablet       baclofen 10 mg tablet Take 10 mg by mouth 2 (two) times a day as needed      levothyroxine 50 mcg tablet Take 50 mcg by mouth daily      lidocaine (LIDODERM) 5 % Place 1 patch on the skin every 24 hours      Omega-3 Fatty Acids (fish oil) 1,000 mg Take 2 g by mouth daily      ondansetron (ZOFRAN-ODT) 4 mg disintegrating tablet Take 4 mg by mouth      PreviDent 5000 Dry Mouth 1.1 % GEL BRUSH WITH TOOTH PASTE TWICE DAILY DO NOT RINSE AFTERWARDS.       Restasis 0.05 % ophthalmic emulsion INSTILL 1 DROP IN BOTH EYES TWICE DAILY      carbamide peroxide (DEBROX) 6.5 % otic solution Administer 5 drops into the left ear 2 (two) times a day 15 mL 0    Cholecalciferol 25 MCG (1000 UT) tablet Take by mouth      diclofenac (VOLTAREN) 75 mg EC tablet Take 75 mg by mouth 2 (two) times a day as needed (Patient not taking: Reported on 11/22/2023)      famotidine (PEPCID) 40 MG tablet Take 1 tablet (40 mg total) by mouth daily at bedtime 30 tablet 3    indomethacin (INDOCIN) 25 mg capsule TAKE 1 CAPSULE BY MOUTH TWICE DAILY WITH FOOD OR MILK (Patient not taking: Reported on 11/22/2023)      loteprednol etabonate (LOTEMAX) 0.5 % ophthalmic suspension 1 drop 4 (four) times a day (Patient not taking: Reported on 11/22/2023) meloxicam (MOBIC) 15 mg tablet Take by mouth      meloxicam (MOBIC) 15 mg tablet Take 1 tablet (15 mg total) by mouth daily (Patient not taking: Reported on 10/21/2021) 30 tablet 0    methocarbamol (ROBAXIN) 500 mg tablet Take by mouth      sertraline (ZOLOFT) 100 mg tablet Take 100 mg by mouth daily         Current Allergies     Allergies as of 11/22/2023 - Reviewed 11/22/2023   Allergen Reaction Noted    Benzethonium chloride Shortness Of Breath 01/17/2023    Enbucrilate Shortness Of Breath 07/20/2023    Fentanyl Shortness Of Breath 05/21/2014    Formaldehyde Headache, Other (See Comments), and Shortness Of Breath 04/14/2011    Petrolatum Other (See Comments) and Shortness Of Breath 11/09/2020    Chocolate - food allergy Headache 11/22/2023    Monosodium glutamate - food allergy GI Intolerance 04/14/2011          The following portions of the patient's history were reviewed and updated as appropriate: allergies, current medications, past family history, past medical history, past social history, past surgical history and problem list.  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[address])     Ptosis     Tinnitus      Past Surgical History:   Procedure Laterality Date    APPENDECTOMY  2013    BREAST LUMPECTOMY Right 2010    CATARACT EXTRACTION, BILATERAL      COLON SIGMOID RESECTION      TONSILLECTOMY      TOTAL ABDOMINAL HYSTERECTOMY  2012    WISDOM TOOTH EXTRACTION       Family History   Problem Relation Age of Onset    Heart disease Mother     Dysphagia Mother     Cancer Mother     Heart disease Father     Heart disease Brother        Objective   /73   Pulse 83   Temp 97.6 °F (36.4 °C) (Tympanic)   Resp 16   Ht 5' 1" (1.549 m)   Wt 53.5 kg (118 lb)   SpO2 98%   BMI 22.30 kg/m²   No LMP recorded.  Patient is postmenopausal. Physical Exam     Physical Exam  Vitals and nursing note reviewed. Constitutional:       General: She is not in acute distress. Appearance: She is well-developed. She is ill-appearing. She is not toxic-appearing or diaphoretic. Comments: Appears mildly ill but in no acute distress. No trismus or conversational dyspnea. HENT:      Head: Normocephalic and atraumatic. Right Ear: Tympanic membrane, ear canal and external ear normal.      Left Ear: Tympanic membrane, ear canal and external ear normal.      Nose: Congestion and rhinorrhea present. Mouth/Throat:      Mouth: Mucous membranes are moist.      Pharynx: Posterior oropharyngeal erythema present. No oropharyngeal exudate. Comments: Cobblestoning posterior pharynx with patchy redness  Eyes:      General:         Right eye: No discharge. Left eye: No discharge. Conjunctiva/sclera: Conjunctivae normal.      Pupils: Pupils are equal, round, and reactive to light. Cardiovascular:      Rate and Rhythm: Normal rate and regular rhythm. Heart sounds: Normal heart sounds. No murmur heard. No friction rub. No gallop. Pulmonary:      Effort: Pulmonary effort is normal. No respiratory distress. Breath sounds: Normal breath sounds. No stridor. No wheezing, rhonchi or rales. Musculoskeletal:      Cervical back: Normal range of motion and neck supple. No rigidity or tenderness. Lymphadenopathy:      Cervical: No cervical adenopathy. Skin:     General: Skin is warm and dry. Coloration: Skin is not jaundiced or pale. Findings: No rash. Neurological:      Mental Status: She is alert and oriented to person, place, and time.    Psychiatric:         Mood and Affect: Mood normal.         Behavior: Behavior normal.

## 2023-11-22 NOTE — PATIENT INSTRUCTIONS
Continue and finish prednisone that was given to you by the other urgent care. Take antibiotic as instructed. May benefit from nasal saline lavages or nasal saline spray every couple hours to clear sinuses. May benefit from cough expectorant such as plain Mucinex. Stay well-hydrated. Vaporizer by the bedside may be helpful. Follow-up with your primary care at upcoming appointment. If you would develop significant worsening symptoms with increased shortness of breath, chest pain or weakness proceed immediately to emergency room for further evaluation.

## 2023-11-27 ENCOUNTER — TELEPHONE (OUTPATIENT)
Dept: URGENT CARE | Facility: CLINIC | Age: 68
End: 2023-11-27

## 2023-11-27 ENCOUNTER — OFFICE VISIT (OUTPATIENT)
Dept: URGENT CARE | Facility: CLINIC | Age: 68
End: 2023-11-27
Payer: MEDICARE

## 2023-11-27 VITALS
SYSTOLIC BLOOD PRESSURE: 112 MMHG | DIASTOLIC BLOOD PRESSURE: 74 MMHG | OXYGEN SATURATION: 98 % | HEART RATE: 91 BPM | RESPIRATION RATE: 16 BRPM | TEMPERATURE: 98.1 F

## 2023-11-27 DIAGNOSIS — R05.1 ACUTE COUGH: Primary | ICD-10-CM

## 2023-11-27 PROCEDURE — 99213 OFFICE O/P EST LOW 20 MIN: CPT | Performed by: NURSE PRACTITIONER

## 2023-11-27 PROCEDURE — G0463 HOSPITAL OUTPT CLINIC VISIT: HCPCS | Performed by: NURSE PRACTITIONER

## 2023-11-27 RX ORDER — DEXTROMETHORPHAN HYDROBROMIDE AND PROMETHAZINE HYDROCHLORIDE 15; 6.25 MG/5ML; MG/5ML
5 SYRUP ORAL 4 TIMES DAILY PRN
Qty: 118 ML | Refills: 0 | Status: SHIPPED | OUTPATIENT
Start: 2023-11-27

## 2023-11-27 RX ORDER — FLUTICASONE PROPIONATE AND SALMETEROL 250; 50 UG/1; UG/1
1 POWDER RESPIRATORY (INHALATION) 2 TIMES DAILY
Qty: 60 BLISTER | Refills: 0 | Status: SHIPPED | OUTPATIENT
Start: 2023-11-27 | End: 2023-12-27

## 2023-11-27 RX ORDER — DEXTROMETHORPHAN HYDROBROMIDE AND PROMETHAZINE HYDROCHLORIDE 15; 6.25 MG/5ML; MG/5ML
5 SYRUP ORAL 4 TIMES DAILY PRN
Qty: 118 ML | Refills: 0 | Status: SHIPPED | OUTPATIENT
Start: 2023-11-27 | End: 2023-11-27 | Stop reason: SDUPTHER

## 2023-11-27 RX ORDER — FLUTICASONE PROPIONATE AND SALMETEROL 250; 50 UG/1; UG/1
1 POWDER RESPIRATORY (INHALATION) 2 TIMES DAILY
Qty: 60 BLISTER | Refills: 0 | Status: SHIPPED | OUTPATIENT
Start: 2023-11-27 | End: 2023-11-27 | Stop reason: SDUPTHER

## 2023-11-27 NOTE — PROGRESS NOTES
[facility]        NAME: Sami Laughlin is a 70 y.o. female  : 1955    MRN: [id number]  DATE: 2023  TIME: 7:18 PM    Assessment and Plan   Acute cough [R05.1]  1. Acute cough  promethazine-dextromethorphan (PHENERGAN-DM) 6.25-15 mg/5 mL oral syrup    Fluticasone-Salmeterol (Advair Diskus) 250-50 mcg/dose inhaler        Start promethazine DM for cough especially at night. And also start Advair. Follow-up with PCP. Patient and spouse agreement with plan. Patient Instructions     Follow up with PCP in 3-5 days. Proceed to  ER if symptoms worsen. Chief Complaint     Chief Complaint   Patient presents with    Cough     Patient states that the cough is not any better. Called health call and they told her to go to express care. She also saw PCP Friday and they told her to just stay on the antibiotic and that was it. Cough is infrequent. History of Present Illness   Sami Laughlin presents to the clinic c/o    Cough (Patient states that the cough is not any better. Called health call and they told her to go to express care. She also saw PCP Friday and they told her to just stay on the antibiotic and that was it. Cough is infrequent.)    On  patient was seen at a different urgent care and was started on a course of prednisone for her URI symptoms. She was then seen here on Wednesday where she had a chest x-ray which was negative for pneumonia at that time. She was started on a course of amoxicillin. On Friday she had her follow-up with her PCP. PCP encouraged her to continue and finish out the prescription for the antibiotic. PCP was not concerned with cough at that time. Patient does have a history of anxiety and is very concerned about her continued symptoms. Cough is unrelenting and productive at times for white phlegm. Review of Systems   Review of Systems   All other systems reviewed and are negative.         Current Medications     Long-Term Medications   Medication Sig Dispense Refill    Fluticasone-Salmeterol (Advair Diskus) 250-50 mcg/dose inhaler Inhale 1 puff 2 (two) times a day Rinse mouth after use. 60 blister 0    ALPRAZolam (XANAX) 0.25 mg tablet       baclofen 10 mg tablet Take 10 mg by mouth 2 (two) times a day as needed      carbamide peroxide (DEBROX) 6.5 % otic solution Administer 5 drops into the left ear 2 (two) times a day 15 mL 0    Cholecalciferol 25 MCG (1000 UT) tablet Take by mouth      diclofenac (VOLTAREN) 75 mg EC tablet Take 75 mg by mouth 2 (two) times a day as needed (Patient not taking: Reported on 11/22/2023)      famotidine (PEPCID) 40 MG tablet Take 1 tablet (40 mg total) by mouth daily at bedtime 30 tablet 3    indomethacin (INDOCIN) 25 mg capsule TAKE 1 CAPSULE BY MOUTH TWICE DAILY WITH FOOD OR MILK (Patient not taking: Reported on 11/22/2023)      levothyroxine 50 mcg tablet Take 50 mcg by mouth daily      lidocaine (LIDODERM) 5 % Place 1 patch on the skin every 24 hours      loteprednol etabonate (LOTEMAX) 0.5 % ophthalmic suspension 1 drop 4 (four) times a day (Patient not taking: Reported on 11/22/2023)      meloxicam (MOBIC) 15 mg tablet Take by mouth      meloxicam (MOBIC) 15 mg tablet Take 1 tablet (15 mg total) by mouth daily (Patient not taking: Reported on 10/21/2021) 30 tablet 0    methocarbamol (ROBAXIN) 500 mg tablet Take by mouth      Omega-3 Fatty Acids (fish oil) 1,000 mg Take 2 g by mouth daily      ondansetron (ZOFRAN-ODT) 4 mg disintegrating tablet Take 4 mg by mouth      PreviDent 5000 Dry Mouth 1.1 % GEL BRUSH WITH TOOTH PASTE TWICE DAILY DO NOT RINSE AFTERWARDS.       Restasis 0.05 % ophthalmic emulsion INSTILL 1 DROP IN BOTH EYES TWICE DAILY      sertraline (ZOLOFT) 100 mg tablet Take 100 mg by mouth daily         Current Allergies     Allergies as of 11/27/2023 - Reviewed 11/27/2023   Allergen Reaction Noted    Benzethonium chloride Shortness Of Breath 01/17/2023    Enbucrilate Shortness Of Breath 07/20/2023    Fentanyl Shortness Of Breath 05/21/2014    Formaldehyde Headache, Other (See Comments), and Shortness Of Breath 04/14/2011    Petrolatum Other (See Comments) and Shortness Of Breath 11/09/2020    Chocolate - food allergy Headache 11/22/2023    Monosodium glutamate - food allergy GI Intolerance 04/14/2011            The following portions of the patient's history were reviewed and updated as appropriate: allergies, current medications, past family history, past medical history, past social history, past surgical history and problem list.    Objective   /74   Pulse 91   Temp 98.1 °F (36.7 °C) (Tympanic)   Resp 16   SpO2 98%        Physical Exam     Physical Exam  Vitals and nursing note reviewed. Constitutional:       Appearance: Normal appearance. She is well-developed. HENT:      Head: Normocephalic and atraumatic. Right Ear: Hearing, tympanic membrane, ear canal and external ear normal.      Left Ear: Hearing, tympanic membrane, ear canal and external ear normal.      Nose: Nose normal.      Mouth/Throat:      Lips: Pink. Mouth: Mucous membranes are moist.      Pharynx: Oropharynx is clear. Eyes:      General: Lids are normal.      Conjunctiva/sclera: Conjunctivae normal.      Pupils: Pupils are equal, round, and reactive to light. Cardiovascular:      Rate and Rhythm: Normal rate and regular rhythm. Heart sounds: Normal heart sounds, S1 normal and S2 normal.   Pulmonary:      Effort: Pulmonary effort is normal.      Breath sounds: Normal breath sounds. Abdominal:      General: Abdomen is flat. Bowel sounds are normal.      Palpations: Abdomen is soft. Musculoskeletal:         General: Normal range of motion. Cervical back: Full passive range of motion without pain, normal range of motion and neck supple. Skin:     General: Skin is warm and dry. Neurological:      General: No focal deficit present.       Mental Status: She is alert and oriented to person, place, and time. Psychiatric:         Mood and Affect: Mood normal.         Speech: Speech normal.         Behavior: Behavior normal. Behavior is cooperative. Thought Content:  Thought content normal.         Judgment: Judgment normal.

## 2023-11-27 NOTE — TELEPHONE ENCOUNTER
Patient called to state that she still has a cough and that she is on an antibiotic and when is the cough going away. Instructed patient that she can gladly come in and be reseen if she was concerned or worse and or to call her pcp. She then verbalizes that she saw her pcp on Friday and they said that the cough was oky and to give it time/ SHe is asking to speak with Marily Rodriguez. Instructed her that Marily Petties is not in today. Then she hung up the phone mid conversation.

## 2023-11-28 NOTE — PATIENT INSTRUCTIONS
Acute Cough   AMBULATORY CARE:   An acute cough  can last up to 3 weeks. Common causes of an acute cough include a cold, allergies, or a lung infection. Seek care immediately if:   You have trouble breathing or feel short of breath. You cough up blood, or you see blood in your mucus. You faint or feel weak or dizzy. You have chest pain when you cough or take a deep breath. You have new wheezing. Contact your healthcare provider if:   You have a fever. Your cough lasts longer than 4 weeks. Your symptoms do not improve with treatment. You have questions or concerns about your condition or care. Treatment:  An acute cough usually goes away on its own. Ask your healthcare provider about medicines you can take to decrease your cough. You may need medicine to stop the cough, decrease swelling in your airways, or help open your airways. Medicine may also be given to help you cough up mucus. If you have an infection caused by bacteria, you may need antibiotics. Manage your symptoms:   Do not smoke and stay away from others who smoke. Nicotine and other chemicals in cigarettes and cigars can cause lung damage and make your cough worse. Ask your healthcare provider for information if you currently smoke and need help to quit. E-cigarettes or smokeless tobacco still contain nicotine. Talk to your healthcare provider before you use these products. Drink extra liquids as directed. Liquids will help thin and loosen mucus so you can cough it up. Liquids will also help prevent dehydration. Examples of good liquids to drink include water, fruit juice, and broth. Do not drink liquids that contain caffeine. Caffeine can increase your risk for dehydration. Ask your healthcare provider how much liquid to drink each day. Rest as directed. Do not do activities that make your cough worse, such as exercise. Use a humidifier or vaporizer.   Use a cool mist humidifier or a vaporizer to increase air moisture in your home. This may make it easier for you to breathe and help decrease your cough. Eat 2 to 5 mL of honey 2 times each day. Honey can help thin mucus and decrease your cough. Use cough drops or lozenges. These can help decrease throat irritation and your cough. Follow up with your healthcare provider as directed:  Write down your questions so you remember to ask them during your visits. © Copyright [employer] 2023 Information is for End User's use only and may not be sold, redistributed or otherwise used for commercial purposes. The above information is an  only. It is not intended as medical advice for individual conditions or treatments. Talk to your doctor, nurse or pharmacist before following any medical regimen to see if it is safe and effective for you.

## 2023-12-26 ENCOUNTER — OFFICE VISIT (OUTPATIENT)
Dept: URGENT CARE | Facility: CLINIC | Age: 68
End: 2023-12-26
Payer: MEDICARE

## 2023-12-26 VITALS
DIASTOLIC BLOOD PRESSURE: 64 MMHG | TEMPERATURE: 97 F | SYSTOLIC BLOOD PRESSURE: 122 MMHG | OXYGEN SATURATION: 98 % | HEART RATE: 68 BPM | RESPIRATION RATE: 18 BRPM

## 2023-12-26 DIAGNOSIS — R05.1 ACUTE COUGH: Primary | ICD-10-CM

## 2023-12-26 DIAGNOSIS — R21 RASH: ICD-10-CM

## 2023-12-26 PROCEDURE — 99213 OFFICE O/P EST LOW 20 MIN: CPT

## 2023-12-26 PROCEDURE — G0463 HOSPITAL OUTPT CLINIC VISIT: HCPCS

## 2023-12-26 RX ORDER — DEXTROMETHORPHAN HYDROBROMIDE AND PROMETHAZINE HYDROCHLORIDE 15; 6.25 MG/5ML; MG/5ML
5 SYRUP ORAL 4 TIMES DAILY PRN
Qty: 120 ML | Refills: 0 | Status: SHIPPED | OUTPATIENT
Start: 2023-12-26

## 2023-12-26 NOTE — PATIENT INSTRUCTIONS
Allegra over the counter  Benadryl as needed at night (do not drive or operate heavy machinery after taking)  Follow up with PCP in 3-5 days.  Proceed to ER if symptoms worsen

## 2023-12-26 NOTE — PROGRESS NOTES
[facility]        NAME: Shonna Plascencia is a 68 y.o. female  : 1955    MRN: [id number]  DATE: 2023  TIME: 12:57 PM    Assessment and Plan   Acute cough [R05.1]  1. Acute cough  promethazine-dextromethorphan (PHENERGAN-DM) 6.25-15 mg/5 mL oral syrup      2. Rash              Patient Instructions     Allegra over the counter  Benadryl as needed at night (do not drive or operate heavy machinery after taking)  Follow up with PCP in 3-5 days.  Follow up with PCP in 3-5 days.  Proceed to  ER if symptoms worsen.    Chief Complaint     Chief Complaint   Patient presents with    Facial Swelling     Pt C/O facial swelling with itching and is painful that started 4 days ago. Pt C/O throat swelling dysaphia, and choking.          History of Present Illness       Patient is a 67 yo female with no significant PMH presenting in the clinic today for rash x 4 days. Admits left eyelid swelling, eye difficulty swallowing, fatigue, cough, congestion, rhinorrhea, sore throat, and nausea. Denies fever, chills, chest pain, eye pain, visual changes, headache, dizziness, SOB, abdominal pain. Admits the use of aloe cream and Zofran for sx management. Positive sick contacts at home. Patient requesting refill of rx cough syrup.    Rash  This is a new problem. The problem has been rapidly worsening since onset. The affected locations include the face, neck and chest. The rash is characterized by dryness and itchiness. She was exposed to an ill contact. Associated symptoms include anorexia, congestion, coughing, facial edema, fatigue, joint pain, rhinorrhea, shortness of breath, a sore throat and vomiting. Pertinent negatives include no diarrhea or fever.       Review of Systems   Review of Systems   Constitutional:  Positive for fatigue. Negative for chills and fever.   HENT:  Positive for congestion, rhinorrhea and sore throat. Negative for ear pain, postnasal drip, sinus pressure and sinus pain.    Respiratory:   Positive for cough and shortness of breath.    Cardiovascular:  Negative for chest pain.   Gastrointestinal:  Positive for anorexia and vomiting. Negative for abdominal pain, diarrhea and nausea.   Musculoskeletal:  Positive for joint pain. Negative for myalgias.   Skin:  Positive for rash.   Neurological:  Negative for headaches.         Current Medications       Current Outpatient Medications:     baclofen 10 mg tablet, Take 10 mg by mouth 2 (two) times a day as needed, Disp: , Rfl:     Fluticasone-Salmeterol (Advair Diskus) 250-50 mcg/dose inhaler, Inhale 1 puff 2 (two) times a day Rinse mouth after use., Disp: 60 blister, Rfl: 0    levothyroxine 50 mcg tablet, Take 50 mcg by mouth daily, Disp: , Rfl:     lidocaine (LIDODERM) 5 %, Place 1 patch on the skin every 24 hours, Disp: , Rfl:     Omega-3 Fatty Acids (fish oil) 1,000 mg, Take 2 g by mouth daily, Disp: , Rfl:     ondansetron (ZOFRAN-ODT) 4 mg disintegrating tablet, Take 4 mg by mouth, Disp: , Rfl:     oxyCODONE (ROXICODONE) 5 immediate release tablet, Take 5 mg by mouth every 6 (six) hours as needed, Disp: , Rfl:     PreviDent 5000 Dry Mouth 1.1 % GEL, BRUSH WITH TOOTH PASTE TWICE DAILY DO NOT RINSE AFTERWARDS., Disp: , Rfl:     promethazine-dextromethorphan (PHENERGAN-DM) 6.25-15 mg/5 mL oral syrup, Take 5 mL by mouth 4 (four) times a day as needed for cough, Disp: 120 mL, Rfl: 0    Restasis 0.05 % ophthalmic emulsion, INSTILL 1 DROP IN BOTH EYES TWICE DAILY, Disp: , Rfl:     ALPRAZolam (XANAX) 0.25 mg tablet, , Disp: , Rfl:     carbamide peroxide (DEBROX) 6.5 % otic solution, Administer 5 drops into the left ear 2 (two) times a day, Disp: 15 mL, Rfl: 0    Cholecalciferol 25 MCG (1000 UT) tablet, Take by mouth, Disp: , Rfl:     diclofenac (VOLTAREN) 75 mg EC tablet, Take 75 mg by mouth 2 (two) times a day as needed (Patient not taking: Reported on 11/22/2023), Disp: , Rfl:     famotidine (PEPCID) 40 MG tablet, Take 1 tablet (40 mg total) by mouth daily at  bedtime, Disp: 30 tablet, Rfl: 3    indomethacin (INDOCIN) 25 mg capsule, TAKE 1 CAPSULE BY MOUTH TWICE DAILY WITH FOOD OR MILK (Patient not taking: Reported on 11/22/2023), Disp: , Rfl:     loteprednol etabonate (LOTEMAX) 0.5 % ophthalmic suspension, 1 drop 4 (four) times a day (Patient not taking: Reported on 11/22/2023), Disp: , Rfl:     meloxicam (MOBIC) 15 mg tablet, Take by mouth, Disp: , Rfl:     meloxicam (MOBIC) 15 mg tablet, Take 1 tablet (15 mg total) by mouth daily (Patient not taking: Reported on 10/21/2021), Disp: 30 tablet, Rfl: 0    methocarbamol (ROBAXIN) 500 mg tablet, Take by mouth, Disp: , Rfl:     methylPREDNISolone 4 MG tablet therapy pack, Follow package directions (Patient not taking: Reported on 12/26/2023), Disp: , Rfl:     oxyCODONE-acetaminophen (PERCOCET) 5-325 mg per tablet, , Disp: , Rfl:     promethazine-dextromethorphan (PHENERGAN-DM) 6.25-15 mg/5 mL oral syrup, Take 5 mL by mouth 4 (four) times a day as needed for cough, Disp: 118 mL, Rfl: 0    sertraline (ZOLOFT) 100 mg tablet, Take 100 mg by mouth daily, Disp: , Rfl:     Current Allergies     Allergies as of 12/26/2023 - Reviewed 12/26/2023   Allergen Reaction Noted    Benzethonium chloride Shortness Of Breath 01/17/2023    Enbucrilate Shortness Of Breath 07/20/2023    Fentanyl Shortness Of Breath 05/21/2014    Formaldehyde Headache, Other (See Comments), and Shortness Of Breath 04/14/2011    Petrolatum Other (See Comments) and Shortness Of Breath 11/09/2020    Chocolate - food allergy Headache 11/22/2023    Monosodium glutamate - food allergy GI Intolerance 04/14/2011            The following portions of the patient's history were reviewed and updated as appropriate: allergies, current medications, past family history, past medical history, past social history, past surgical history and problem list.     Past Medical History:   Diagnosis Date    Cancer (HCC)     breast    Cataract     Droopy eyelid     Eye swelling, bilateral      MS (multiple sclerosis) (HCC)     OCULAR PHARANZEAL    Oculopharyngeal muscular dystrophy (HCC)     Ptosis     Tinnitus        Past Surgical History:   Procedure Laterality Date    APPENDECTOMY  2013    BREAST LUMPECTOMY Right 2010    CATARACT EXTRACTION, BILATERAL      COLON SIGMOID RESECTION      TONSILLECTOMY      TOTAL ABDOMINAL HYSTERECTOMY  2012    WISDOM TOOTH EXTRACTION         Family History   Problem Relation Age of Onset    Heart disease Mother     Dysphagia Mother     Cancer Mother     Heart disease Father     Heart disease Brother          Medications have been verified.        Objective   /64 (BP Location: Left arm, Patient Position: Sitting, Cuff Size: Standard)   Pulse 68   Temp (!) 97 °F (36.1 °C) (Tympanic)   Resp 18   SpO2 98%        Physical Exam     Physical Exam  Vitals reviewed.   Constitutional:       General: She is not in acute distress.     Appearance: Normal appearance. She is normal weight. She is not ill-appearing.   HENT:      Head: Normocephalic. No right periorbital erythema or left periorbital erythema.      Right Ear: Hearing, tympanic membrane, ear canal and external ear normal. No middle ear effusion. There is no impacted cerumen. Tympanic membrane is not erythematous or bulging.      Left Ear: Hearing, tympanic membrane, ear canal and external ear normal.  No middle ear effusion. There is no impacted cerumen. Tympanic membrane is not erythematous or bulging.      Nose: Nose normal. No congestion or rhinorrhea.      Right Sinus: No maxillary sinus tenderness or frontal sinus tenderness.      Left Sinus: No maxillary sinus tenderness or frontal sinus tenderness.      Mouth/Throat:      Lips: Pink.      Mouth: Mucous membranes are moist.      Pharynx: Oropharynx is clear. Uvula midline. No pharyngeal swelling, oropharyngeal exudate, posterior oropharyngeal erythema or uvula swelling.      Tonsils: No tonsillar exudate or tonsillar abscesses. 1+ on the right. 1+ on the  left.      Comments: PND present.  Eyes:      General:         Right eye: No discharge.         Left eye: No discharge.      Extraocular Movements: Extraocular movements intact.      Conjunctiva/sclera: Conjunctivae normal.      Pupils: Pupils are equal, round, and reactive to light.   Cardiovascular:      Rate and Rhythm: Normal rate and regular rhythm.      Pulses: Normal pulses.      Heart sounds: Normal heart sounds. No murmur heard.     No friction rub. No gallop.   Pulmonary:      Effort: Pulmonary effort is normal.      Breath sounds: Normal breath sounds. No wheezing, rhonchi or rales.   Musculoskeletal:      Cervical back: Normal range of motion and neck supple. No tenderness.   Lymphadenopathy:      Cervical: No cervical adenopathy.   Skin:     General: Skin is warm.      Findings: Rash present.      Comments: Erythematous, blanching, macular rash located along the left and right facial cheeks.   Neurological:      Mental Status: She is alert.   Psychiatric:         Mood and Affect: Mood normal.         Behavior: Behavior normal.

## 2024-02-06 ENCOUNTER — APPOINTMENT (OUTPATIENT)
Dept: PHYSICAL THERAPY | Facility: CLINIC | Age: 69
End: 2024-02-06
Payer: MEDICARE

## 2024-02-07 ENCOUNTER — EVALUATION (OUTPATIENT)
Dept: PHYSICAL THERAPY | Facility: CLINIC | Age: 69
End: 2024-02-07
Payer: MEDICARE

## 2024-02-07 DIAGNOSIS — I89.0 LYMPHEDEMA: Primary | ICD-10-CM

## 2024-02-07 PROCEDURE — 97163 PT EVAL HIGH COMPLEX 45 MIN: CPT | Performed by: PHYSICAL THERAPIST

## 2024-02-07 NOTE — LETTER
2024    Tristen Matos MD  [address] [facility] [address]    Patient: Shonna Plascencia   YOB: 1955   Date of Visit: 2024     Encounter Diagnosis     ICD-10-CM    1. Lymphedema  I89.0           Dear Dr. Matos:    Thank you for your recent referral of Shonna Plascencia. Please review the attached evaluation summary from Shonna's recent visit.     Please verify that you agree with the plan of care by signing the attached order.     If you have any questions or concerns, please do not hesitate to call.     I sincerely appreciate the opportunity to share in the care of one of your patients and hope to have another opportunity to work with you in the near future.       Sincerely,    Angel Soares, PT      Referring Provider:      I certify that I have read the below Plan of Care and certify the need for these services furnished under this plan of treatment while under my care.                    Tristen Matos MD  [address] [facility] [address] [address]  Via Fax: [phone]          PT Evaluation     Today's date: 2024  Patient name: Shonna Plascencia  : 1955  MRN: [id number]  Referring provider: Tristen Matos MD  Dx:   Encounter Diagnosis     ICD-10-CM    1. Lymphedema  I89.0                    Lymphedema Physical Assessment    SUBJECTIVE EVALUATION:  History of present illness:  Pt presents for examination stating her R arm has been pretty good, well managed w/ use of new garments previously fitted for but concern that her garments no longer fit properly as she has recently lost weight, approx 10 lbs, unintentionally a she cites poor appetitive related to constant PN sx's and mourning the loss of her brother.  Citing a PN in L axilla cont'd as previously seen for but also PN in L c/s thru scalene and supraclavicular fossa regions, also PN along L medial upper arm.  PN worse overnight, disrupts her sleep. Reaching or lifting items she ends up dropping  items more frequently.  Feels like  strength is reduced.  Pt is R hand dominant but uses L UE more to mind her lymphedema in R UE.  Pt has consulted w/ PN management w/o success and is consulting w/ a different provider upcoming.  Pt has had mammogram in [facility] in [facility], also US L breast/axilla w/ indication for enlarged L axillary lymph node but no malignancy suggested. Pt admits a PN from medial aspect of elbow to axilla, also PN in superior aspect of L scapula.  Pt has been using her PN patches,  cont'd PN management w/ oxycodone w/ acetaminophen.  Denies use of HP/CP modalities.    Pain (0-10): 3/10 - 5/10   Location: Upper arm/axilla R upper quadrant    Social Support:    Lives in: home, 2 story   Lives with: spouse    Occupation:  Employment status:  Retired  Physical job demands: n/a    OBJECTIVE:    Pulse: (0 no, 1+ diminished, 2+ mildly diminished, 3+ normal, 4 bounding)    Radial: 3+      Capillary Refill (=< 3 seconds is normal): normal    Palpation: Good overall mobility of R UE skin w/ mild edema presence well controlled at current state w/ use of daytime and nighttime garments    Pitting: Trace    Stemmer's Sign: Neg    Gait assessment: WNL    Transfer status: B UE A    Ability to don/doff clothing/garments: I    LE ROM: n/a  Knee flexion limitation:  Ankle DF/PF limitation:    UE ROM: WNL R UE  Shldr OH flex:  Shldr OH abd:  Shldr ER:  Shldr IR:  Elbow flex/ext:  Wrist flex/ext:    Strength:  UE:  LE:    Self management:  Home compression pump: no  Compression stockings: yes, day and night time  Lymphedema exercise: yes    Skin Assessment:  Skin mobility: Good t/o R UE  Fibrosis (0 normal - 4 >severe): 0-1  Erythema scale (1 very faint, 2 faint, 3 bright, 4 very bright): 1  Hemosiderin staining present:  Blister present: no   Location:   Color:   Size:  Wound present: no   Location:   Color:   Size:    Visualization:   Bony prominences: good  Tendon pathways: good thru R hand  Joint creases:  good, minimal loss of visualization    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, Oculopharyngeal muscular dystrophy    UPPER EXTREMITY RIGHT CALCULATIONS      Flowsheet Row Most Recent Value   Volume UE (mL)    Difference from last visit (mL)  -   Difference from first visit (mL)  -            R UE volume last assessment 2414 mL on 2023    >> REFER TO FLOW SHEET FOR GIRTH MEASUREMENTS <<    ASSESSMENT:  Assessment details:  Pt presents for RA of chronic R UE lymphedema related to past CA tx after lumpectomy '10.  She has ongoing PN c/o of B UE.  She has unplanned wt loss of 10 lbs and reports ongoing grief regarding the loss of her brother.  This and PN sx's affect appetite.  Primary concern for wt loss as it applies to her lymphedema is reduction of overall vol of R UE, most sig change in R upper arm creating poor fitment of current garments.  I advise pt is overdue for garment fitment and ordering of new, especially to adjust for vol change of R UE.  At his time phase 1 CDT is not advised. Pt to consult w/ PN mngmt regarding ongoing PN issues.     Goals:  ST. Pt to maintain reduced volume of R upper arm over 6 wks  2. Pt to maintain daily and nighttime garments   3. Reduce PN L UE <4/10 w/ all activity within 3 wks    LT. Pt to be fitted and order new day and night time gaments within 6 wks  2. Maintain R UE reduced girth measurements thru upper arm over 8 weeks +  3. Reduce PN <1/10 L UE within 6 wks      Lymphedema Life Impact Scale    Lymphedema Life Impact Scale    I. Physical Concerns (Note: if swelling and sx's are the same in both limbs, rate them the same, otherwise rate only the worst limb)      1. The amount of PN associated w/ my lymphedema is: (no PN 0 - severe PN 4) = 2     2. The amount of limb heaviness associated w/ my lymphedema is: (no heaviness 0 - extremely 4) = 3     3. The amount of skin tightness associated w/ my lymphedema is: (no tightness 0 - extremely 4) = 3     4. The size of my swollen limb(s) seems: (normal 0 - extremely 4) = 3     5. Lymphedema affects the movement of my swollen limb(s): (normal 0 - extremely 4) = 4     6. The strength in my swollen limb(s) is: (normal 0 - extremely weak 4) = 3    II. Psychosocial Concerns     7. Lymphedema affects my body image (how I think I look): (not at all 0 - completely 4) = 2     8. Lymphedema affects my socializing w/ others: (no interference 0  - completely 4) = 3     9. Lymphedema affects my intimate relations w/ spouse/partner (Rate 0 if n/a): (no interference 0 - completely 4) = 3     10. Lymphedema 'gets me down' (I.e., feelings of depression/frustration/anger due to lymph): (never 0 - constant 4) = 2     11. I must rely on others for help due to my lymphedema: (not at all 0 - completely 4) = 4     12. I know what to do to manage my lymphedema: (good understanding 0 - no understanding 4) = 0    III. Functional Concerns     13. Lymphedema affects my ability to perform self care (I.e, eating/dressing/hygiene): (no interference 0 - completely 4) = 4     14. Lymphedema affects my ability to perform routine home/work related activity): (no interference 0 - completely 4) = 4     15. Lymphedema affects my performance of preferred leisure activity: (no interference 0 - completely 4) = 4     16. Lymphedema affects the proper fit of clothing/shoes: (fits normal 0 - unable to wear 4) = 3     17. Lymphedema affects my sleep: (no interference 0 - completely 4) = 4    IV. Infection Occurrence     18. In the past year, I have become ill w/ an infection in my swollen limb requiring oral antibiotics or hospitalization: 0  0,    1x,    2x,    3x,     4+                                  Total Score: 51  Score: 51  % Impairment: 71%  Infection Occurrence: 0        PLAN:  Patient would benefit from: skilled physical therapy  Planned therapy interventions: manual therapy, massage, compression, home exercise program and patient education  Frequency: 1x/wk  Duration in visits: 6  Duration in weeks: 6  Plan of Care beginning date: 2/7/2024  Plan of Care expiration date: 3/21/2024  Treatment plan discussed with: patient       Precautions: Hx Breast CA, R UE lymphedema      Manuals                                                                                           Neuro Re-Ed                                                                                                        Ther Ex                                                                                                                                                            Ther Activity                                       Gait Training                                       Modalities

## 2024-02-07 NOTE — PROGRESS NOTES
PT Evaluation     Today's date: 2024  Patient name: Shonna Plascencia  : 1955  MRN: [id number]  Referring provider: Tristen Matos MD  Dx:   Encounter Diagnosis     ICD-10-CM    1. Lymphedema  I89.0                    Lymphedema Physical Assessment    SUBJECTIVE EVALUATION:  History of present illness:  Pt presents for examination stating her R arm has been pretty good, well managed w/ use of new garments previously fitted for but concern that her garments no longer fit properly as she has recently lost weight, approx 10 lbs, unintentionally a she cites poor appetitive related to constant PN sx's and mourning the loss of her brother.  Citing a PN in L axilla cont'd as previously seen for but also PN in L c/s thru scalene and supraclavicular fossa regions, also PN along L medial upper arm.  PN worse overnight, disrupts her sleep. Reaching or lifting items she ends up dropping items more frequently.  Feels like  strength is reduced.  Pt is R hand dominant but uses L UE more to mind her lymphedema in R UE.  Pt has consulted w/ PN management w/o success and is consulting w/ a different provider upcoming.  Pt has had mammogram in  in [facility], also US L breast/axilla w/ indication for enlarged L axillary lymph node but no malignancy suggested. Pt admits a PN from medial aspect of elbow to axilla, also PN in superior aspect of L scapula.  Pt has been using her PN patches,  cont'd PN management w/ oxycodone w/ acetaminophen.  Denies use of HP/CP modalities.    Pain (0-10): 3/10 - 5/10   Location: Upper arm/axilla R upper quadrant    Social Support:    Lives in: home, 2 story   Lives with: spouse    Occupation:  Employment status:  Retired  Physical job demands: n/a    OBJECTIVE:    Pulse: (0 no, 1+ diminished, 2+ mildly diminished, 3+ normal, 4 bounding)    Radial: 3+      Capillary Refill (=< 3 seconds is normal): normal    Palpation: Good overall mobility of R UE skin w/ mild edema presence well  controlled at current state w/ use of daytime and nighttime garments    Pitting: Trace    Stemmer's Sign: Neg    Gait assessment: WNL    Transfer status: B UE A    Ability to don/doff clothing/garments: I    LE ROM: n/a  Knee flexion limitation:  Ankle DF/PF limitation:    UE ROM: WNL R UE  Shldr OH flex:  Shldr OH abd:  Shldr ER:  Shldr IR:  Elbow flex/ext:  Wrist flex/ext:    Strength:  UE:  LE:    Self management:  Home compression pump: no  Compression stockings: yes, day and night time  Lymphedema exercise: yes    Skin Assessment:  Skin mobility: Good t/o R UE  Fibrosis (0 normal - 4 >severe): 0-1  Erythema scale (1 very faint, 2 faint, 3 bright, 4 very bright): 1  Hemosiderin staining present:  Blister present: no   Location:   Color:   Size:  Wound present: no   Location:   Color:   Size:    Visualization:   Bony prominences: good  Tendon pathways: good thru R hand  Joint creases: good, minimal loss of visualization    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  She has ongoing PN c/o of B UE.  She has unplanned wt loss of 10 lbs and reports ongoing grief regarding the loss of her brother.  This and PN sx's affect appetite.  Primary concern for wt loss as it applies to her  lymphedema is reduction of overall vol of R UE, most sig change in R upper arm creating poor fitment of current garments.  I advise pt is overdue for garment fitment and ordering of new, especially to adjust for vol change of R UE.  At his time phase 1 CDT is not advised. Pt to consult w/ PN mngmt regarding ongoing PN issues.     Goals:  ST. Pt to maintain reduced volume of R upper arm over 6 wks  2. Pt to maintain daily and nighttime garments   3. Reduce PN L UE <4/10 w/ all activity within 3 wks    LT. Pt to be fitted and order new day and night time gaments within 6 wks  2. Maintain R UE reduced girth measurements thru upper arm over 8 weeks +  3. Reduce PN <1/10 L UE within 6 wks      Lymphedema Life Impact Scale    Lymphedema Life Impact Scale    I. Physical Concerns (Note: if swelling and sx's are the same in both limbs, rate them the same, otherwise rate only the worst limb)     1. The amount of PN associated w/ my lymphedema is: (no PN 0 - severe PN 4) = 2     2. The amount of limb heaviness associated w/ my lymphedema is: (no heaviness 0 - extremely 4) = 3     3. The amount of skin tightness associated w/ my lymphedema is: (no tightness 0 - extremely 4) = 3     4. The size of my swollen limb(s) seems: (normal 0 - extremely 4) = 3     5. Lymphedema affects the movement of my swollen limb(s): (normal 0 - extremely 4) = 4     6. The strength in my swollen limb(s) is: (normal 0 - extremely weak 4) = 3    II. Psychosocial Concerns     7. Lymphedema affects my body image (how I think I look): (not at all 0 - completely 4) = 2     8. Lymphedema affects my socializing w/ others: (no interference 0  - completely 4) = 3     9. Lymphedema affects my intimate relations w/ spouse/partner (Rate 0 if n/a): (no interference 0 - completely 4) = 3     10. Lymphedema 'gets me down' (I.e., feelings of depression/frustration/anger due to lymph): (never 0 - constant 4) = 2     11. I must rely on others for help due to  my lymphedema: (not at all 0 - completely 4) = 4     12. I know what to do to manage my lymphedema: (good understanding 0 - no understanding 4) = 0    III. Functional Concerns     13. Lymphedema affects my ability to perform self care (I.e, eating/dressing/hygiene): (no interference 0 - completely 4) = 4     14. Lymphedema affects my ability to perform routine home/work related activity): (no interference 0 - completely 4) = 4     15. Lymphedema affects my performance of preferred leisure activity: (no interference 0 - completely 4) = 4     16. Lymphedema affects the proper fit of clothing/shoes: (fits normal 0 - unable to wear 4) = 3     17. Lymphedema affects my sleep: (no interference 0 - completely 4) = 4    IV. Infection Occurrence     18. In the past year, I have become ill w/ an infection in my swollen limb requiring oral antibiotics or hospitalization: 0  0,    1x,    2x,    3x,    4+                                  Total Score: 51  Score: 51  % Impairment: 71%  Infection Occurrence: 0        PLAN:  Patient would benefit from: skilled physical therapy  Planned therapy interventions: manual therapy, massage, compression, home exercise program and patient education  Frequency: 1x/wk  Duration in visits: 6  Duration in weeks: 6  Plan of Care beginning date: 2/7/2024  Plan of Care expiration date: 3/21/2024  Treatment plan discussed with: patient       Precautions: Hx Breast CA, R UE lymphedema      Manuals                                                                                           Neuro Re-Ed                                                                                                        Ther Ex                                                                                                                                                            Ther Activity                                       Gait Training                                       Modalities

## 2024-02-12 ENCOUNTER — OFFICE VISIT (OUTPATIENT)
Dept: PHYSICAL THERAPY | Facility: CLINIC | Age: 69
End: 2024-02-12
Payer: MEDICARE

## 2024-02-12 DIAGNOSIS — I89.0 LYMPHEDEMA: Primary | ICD-10-CM

## 2024-02-12 PROCEDURE — 97140 MANUAL THERAPY 1/> REGIONS: CPT | Performed by: PHYSICAL THERAPIST

## 2024-02-12 NOTE — PROGRESS NOTES
Daily Note     Today's date: 2024  Patient name: Shonna Plascencia  : 1955  MRN: [id number]  Referring provider: Tristen Matos MD  Dx:   Encounter Diagnosis     ICD-10-CM    1. Lymphedema  I89.0                      Subjective: Pt presents this day for garment fitment R UE after IE LV indicated overall vol reduction R UE relatable to overall body wt reduction.       Objective: See treatment diary below      Assessment: Tolerated treatment well. Patient Measured this day, please refer to media for garment measurements obtained and provided to pt this day who she will order thru "[employer].  This includes a Juzo C-G 20-30 mmHg daytime compression garment, custom, and also a Jobst Relax nighttime A-G compression garment.  Pt requires custom fitment due to irregularity of limb shape and size.  Will f/u prn, so long as her UE edema is self managed w/ garments pt does not require phase 1 CDT at this time.      Plan: D/c skilled PT, will maintain phase 2, f/u prn.      Precautions: Hx Breast CA, R UE lymphedema      Manuals 24            Garment measurements [facility]                                                                             Neuro Re-Ed                                                                                                        Ther Ex                                                                                                                                                            Ther Activity                                       Gait Training                                       Modalities

## 2024-02-13 ENCOUNTER — APPOINTMENT (OUTPATIENT)
Dept: PHYSICAL THERAPY | Facility: CLINIC | Age: 69
End: 2024-02-13
Payer: MEDICARE

## 2024-04-30 PROCEDURE — 88305 TISSUE EXAM BY PATHOLOGIST: CPT | Performed by: STUDENT IN AN ORGANIZED HEALTH CARE EDUCATION/TRAINING PROGRAM

## 2024-05-01 ENCOUNTER — LAB REQUISITION (OUTPATIENT)
Dept: LAB | Facility: HOSPITAL | Age: 69
End: 2024-05-01
Payer: MEDICARE

## 2024-05-01 DIAGNOSIS — Z12.11 ENCOUNTER FOR SCREENING FOR MALIGNANT NEOPLASM OF COLON: ICD-10-CM

## 2024-05-01 DIAGNOSIS — Z85.3 PERSONAL HISTORY OF MALIGNANT NEOPLASM OF BREAST: ICD-10-CM

## 2024-05-03 PROCEDURE — 88305 TISSUE EXAM BY PATHOLOGIST: CPT | Performed by: STUDENT IN AN ORGANIZED HEALTH CARE EDUCATION/TRAINING PROGRAM

## 2024-06-13 ENCOUNTER — TELEPHONE (OUTPATIENT)
Dept: PSYCHIATRY | Facility: CLINIC | Age: 69
End: 2024-06-13

## 2024-06-13 NOTE — TELEPHONE ENCOUNTER
Patient called the office and left a voicemail seeking services for medication management. She was seeking services to aid in anxiety/ PTSD/ Cancer survivor.Writer returned the call and left a voicemail for the patient to call the office back.

## 2024-09-09 ENCOUNTER — EVALUATION (OUTPATIENT)
Dept: PHYSICAL THERAPY | Facility: CLINIC | Age: 69
End: 2024-09-09
Payer: MEDICARE

## 2024-09-09 DIAGNOSIS — I89.0 LYMPHEDEMA: Primary | ICD-10-CM

## 2024-09-09 PROCEDURE — 97163 PT EVAL HIGH COMPLEX 45 MIN: CPT | Performed by: PHYSICAL THERAPIST

## 2024-09-09 NOTE — LETTER
2024    Tristen Matos MD  [address] [facility] [address]    Patient: Shonna Plascencia   YOB: 1955   Date of Visit: 2024     Encounter Diagnosis     ICD-10-CM    1. Lymphedema  I89.0           Dear Dr. Matos:    Thank you for your recent referral of Shonna Plascencia. Please review the attached evaluation summary from Shonna's recent visit.     Please verify that you agree with the plan of care by signing the attached order.     If you have any questions or concerns, please do not hesitate to call.     I sincerely appreciate the opportunity to share in the care of one of your patients and hope to have another opportunity to work with you in the near future.       Sincerely,    Angel Soares, PT      Referring Provider:      I certify that I have read the below Plan of Care and certify the need for these services furnished under this plan of treatment while under my care.                    Tristen Matos MD  [address] [facility] [address] [address]  Via Fax: [phone]          PT Evaluation     Today's date: 2024  Patient name: Shonna Plascencia  : 1955  MRN: [id number]  Referring provider: Tristen Matos MD  Dx:   Encounter Diagnosis     ICD-10-CM    1. Lymphedema  I89.0                    Lymphedema Physical Assessment    SUBJECTIVE EVALUATION:  History of present illness:  Pt presents for examination stating her R arm has been pretty good, well managed w/ use of new garments previously fitted for.   Citing a PN in L axilla cont'd as previously seen for but also PN in L c/s thru scalene and supraclavicular fossa regions, also PN along L medial upper arm.  PN worse overnight, disrupts her sleep. Reaching or lifting items she ends up dropping items more frequently.  Feels like  strength is reduced.  C/o limited B shldr AROM.  Now requires A to don/doff garments for R UE.  Changing clothes can be an issue at times.  Pt is R hand dominant but uses  L UE more to mind her lymphedema in R UE.  Pt has consulted w/ PN management w/o success and is consulting w/ a different provider upcoming.  Pt has had mammogram in '21 in [facility], also US L breast/axilla w/ indication for enlarged L axillary lymph node but no malignancy suggested. Pt admits a PN from medial aspect of elbow to axilla, also PN in superior aspect of L scapula.  Pt has been using her PN patches,  cont'd PN management w/ oxycodone w/ acetaminophen.  Denies use of HP/CP modalities.  Pt reports no sx of sense of heaviness in R LE, feels R>L thigh girth is notable.     Pain (0-10): 3/10 - 5/10   Location: Upper arm/axilla R upper quadrant    Social Support:    Lives in: home, 2 story   Lives with: spouse    Occupation:  Employment status:  Retired  Physical job demands: n/a    OBJECTIVE:    Pulse: (0 no, 1+ diminished, 2+ mildly diminished, 3+ normal, 4 bounding)    Radial: 3+      Capillary Refill (=< 3 seconds is normal): normal    Palpation: Good overall mobility of R UE skin w/ mild edema presence well controlled at current state w/ use of daytime and nighttime garments    Pitting: Trace    Stemmer's Sign: Neg    Gait assessment: WNL    Transfer status: B UE A    Ability to don/doff clothing/garments: I    LE ROM: n/a  Knee flexion limitation:  Ankle DF/PF limitation:    UE ROM: WNL R UE - mild guarding/tension at end range OH L and R UE.   Shldr OH flex:  Shldr OH abd:  Shldr ER:  Shldr IR:  Elbow flex/ext:  Wrist flex/ext:    Strength:  UE:  LE:    Self management:  Home compression pump: no  Compression stockings: yes, day and night time  Lymphedema exercise: yes    Skin Assessment:  Skin mobility: Good t/o R UE  Fibrosis (0 normal - 4 >severe): 0-1  Erythema scale (1 very faint, 2 faint, 3 bright, 4 very bright): 1  Hemosiderin staining present:  Blister present: no   Location:   Color:   Size:  Wound present: no   Location:   Color:   Size:    Visualization:   Bony prominences:  good  Tendon pathways: good thru R hand  Joint creases: good, minimal loss of visualization    I did visually inspect R and L LE to find no pitting into either LE, normal pulse and cap refill.  R thigh does appear slightly larger than L however this is skewed slightly by rolled pant legs.  Advise pt cont to monitor, will f/u prn.     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, Oculopharyngeal muscular dystrophy      R UE volume assessment 2414 mL on 2023  R UE volume last assessment 2204 mL on 2024    UPPER EXTREMITY LEFT CALCULATIONS      Flowsheet Row Most Recent Value   Volume UE (mL)    Difference from last visit (mL)  -   Difference from first visit (mL)  -          UPPER EXTREMITY RIGHT CALCULATIONS      Flowsheet Row Most Recent Value   Volume UE (mL)    Difference from last visit (mL)  -   Difference from first visit (mL)  -          >> REFER TO FLOW SHEET FOR GIRTH MEASUREMENTS <<    ASSESSMENT:  Assessment details:  Pt presents for RA of chronic R UE lymphedema related to past CA tx after lumpectomy '10.  She has ongoing PN c/o of B UE.  She has cont'd unplanned wt loss she in part attributes to her dysphagia.   Primary concern for wt loss as it applies to her lymphedema is reduction of overall vol of R UE, most sig change in R upper arm, despite this she c/o a tight fitment of current garments requiring A to don.   I advise an approx 6 mo f/u for RA pending when in need for new garments, also to cont to monitor for R LE edema concerns pt has.  Will assess this at next RA or earlier prn.     Goals:  ST. Pt to maintain reduced volume of R upper arm over 6 wks  2. Pt to maintain daily and  nighttime garments   3. Reduce PN L UE <4/10 w/ all activity within 3 wks    LT. Pt to be fitted and order new day and night time gaments within 6 wks  2. Maintain R UE reduced girth measurements thru upper arm over 8 weeks +  3. Reduce PN <1/10 L UE within 6 wks      Lymphedema Life Impact Scale    Lymphedema Life Impact Scale    I. Physical Concerns (Note: if swelling and sx's are the same in both limbs, rate them the same, otherwise rate only the worst limb)     1. The amount of PN associated w/ my lymphedema is: (no PN 0 - severe PN 4) = 2, 3     2. The amount of limb heaviness associated w/ my lymphedema is: (no heaviness 0 - extremely 4) = 3, 3     3. The amount of skin tightness associated w/ my lymphedema is: (no tightness 0 - extremely 4) = 3, 3     4. The size of my swollen limb(s) seems: (normal 0 - extremely 4) = 3, 2     5. Lymphedema affects the movement of my swollen limb(s): (normal 0 - extremely 4) = 4, 3     6. The strength in my swollen limb(s) is: (normal 0 - extremely weak 4) = 3, 2    II. Psychosocial Concerns     7. Lymphedema affects my body image (how I think I look): (not at all 0 - completely 4) = 2, 3     8. Lymphedema affects my socializing w/ others: (no interference 0  - completely 4) = 3, 3     9. Lymphedema affects my intimate relations w/ spouse/partner (Rate 0 if n/a): (no interference 0 - completely 4) = 3, 3     10. Lymphedema 'gets me down' (I.e., feelings of depression/frustration/anger due to lymph): (never 0 - constant 4) = 2, 2     11. I must rely on others for help due to my lymphedema: (not at all 0 - completely 4) = 4, 3     12. I know what to do to manage my lymphedema: (good understanding 0 - no understanding 4) = 0, 1    III. Functional Concerns     13. Lymphedema affects my ability to perform self care (I.e, eating/dressing/hygiene): (no interference 0 - completely 4) = 4, 3     14. Lymphedema affects my ability to perform routine home/work related activity): (no  interference 0 - completely 4) = 4, 3     15. Lymphedema affects my performance of preferred leisure activity: (no interference 0 - completely 4) = 4, 3     16. Lymphedema affects the proper fit of clothing/shoes: (fits normal 0 - unable to wear 4) = 3, 3     17. Lymphedema affects my sleep: (no interference 0 - completely 4) = 4, 2    IV. Infection Occurrence     18. In the past year, I have become ill w/ an infection in my swollen limb requiring oral antibiotics or hospitalization: 0, 0  0,    1x,    2x,    3x,    4+                                  Total Score: 45  Score: 51, 45  % Impairment: 71%, 63%  Infection Occurrence: 0, 0        PLAN:  Patient would benefit from: skilled physical therapy  Planned therapy interventions: manual therapy, massage, compression, home exercise program and patient education  Frequency: 1x/wk  Duration in visits: 1  Duration in weeks: 1  Plan of Care beginning date: 9/9/2024  Plan of Care expiration date: 9/16/2024  Treatment plan discussed with: patient       Precautions: Hx Breast CA, R UE lymphedema      Manuals 9-9-24                                                                                          Neuro Re-Ed                                                                                                        Ther Ex             Wall walk flex HEP            Wall walk abd HEP            Doorway str HEP            Pendulums HEP                                                                                                       Ther Activity                                       Gait Training                                       Modalities

## 2024-09-09 NOTE — PROGRESS NOTES
PT Evaluation     Today's date: 2024  Patient name: Shonna Plascencia  : 1955  MRN: [id number]  Referring provider: Tristen Matos MD  Dx:   Encounter Diagnosis     ICD-10-CM    1. Lymphedema  I89.0                    Lymphedema Physical Assessment    SUBJECTIVE EVALUATION:  History of present illness:  Pt presents for examination stating her R arm has been pretty good, well managed w/ use of new garments previously fitted for.   Citing a PN in L axilla cont'd as previously seen for but also PN in L c/s thru scalene and supraclavicular fossa regions, also PN along L medial upper arm.  PN worse overnight, disrupts her sleep. Reaching or lifting items she ends up dropping items more frequently.  Feels like  strength is reduced.  C/o limited B shldr AROM.  Now requires A to don/doff garments for R UE.  Changing clothes can be an issue at times.  Pt is R hand dominant but uses L UE more to mind her lymphedema in R UE.  Pt has consulted w/ PN management w/o success and is consulting w/ a different provider upcoming.  Pt has had mammogram in  in [facility], also US L breast/axilla w/ indication for enlarged L axillary lymph node but no malignancy suggested. Pt admits a PN from medial aspect of elbow to axilla, also PN in superior aspect of L scapula.  Pt has been using her PN patches,  cont'd PN management w/ oxycodone w/ acetaminophen.  Denies use of HP/CP modalities.  Pt reports no sx of sense of heaviness in R LE, feels R>L thigh girth is notable.     Pain (0-10): 3/10 - 5/10   Location: Upper arm/axilla R upper quadrant    Social Support:    Lives in: home, 2 story   Lives with: spouse    Occupation:  Employment status:  Retired  Physical job demands: n/a    OBJECTIVE:    Pulse: (0 no, 1+ diminished, 2+ mildly diminished, 3+ normal, 4 bounding)    Radial: 3+      Capillary Refill (=< 3 seconds is normal): normal    Palpation: Good overall mobility of R UE skin w/ mild edema presence well controlled  at current state w/ use of daytime and nighttime garments    Pitting: Trace    Stemmer's Sign: Neg    Gait assessment: WNL    Transfer status: B UE A    Ability to don/doff clothing/garments: I    LE ROM: n/a  Knee flexion limitation:  Ankle DF/PF limitation:    UE ROM: WNL R UE - mild guarding/tension at end range OH L and R UE.   Shldr OH flex:  Shldr OH abd:  Shldr ER:  Shldr IR:  Elbow flex/ext:  Wrist flex/ext:    Strength:  UE:  LE:    Self management:  Home compression pump: no  Compression stockings: yes, day and night time  Lymphedema exercise: yes    Skin Assessment:  Skin mobility: Good t/o R UE  Fibrosis (0 normal - 4 >severe): 0-1  Erythema scale (1 very faint, 2 faint, 3 bright, 4 very bright): 1  Hemosiderin staining present:  Blister present: no   Location:   Color:   Size:  Wound present: no   Location:   Color:   Size:    Visualization:   Bony prominences: good  Tendon pathways: good thru R hand  Joint creases: good, minimal loss of visualization    I did visually inspect R and L LE to find no pitting into either LE, normal pulse and cap refill.  R thigh does appear slightly larger than L however this is skewed slightly by rolled pant legs.  Advise pt cont to monitor, will f/u prn.     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, Oculopharyngeal muscular dystrophy      R UE volume assessment 2414 mL on 2/6/2023  R UE volume last assessment 2204 mL on 2/7/2024    UPPER EXTREMITY LEFT CALCULATIONS      Flowsheet Row Most Recent Value   Volume UE (mL) 1948   Difference from last visit (mL)  -1948   Difference from first visit (mL)  -1948          UPPER EXTREMITY RIGHT CALCULATIONS      Flowsheet Row Most Recent Value    Volume UE (mL)    Difference from last visit (mL)  -   Difference from first visit (mL)  -          >> REFER TO FLOW SHEET FOR GIRTH MEASUREMENTS <<    ASSESSMENT:  Assessment details:  Pt presents for RA of chronic R UE lymphedema related to past CA tx after lumpectomy '10.  She has ongoing PN c/o of B UE.  She has cont'd unplanned wt loss she in part attributes to her dysphagia.   Primary concern for wt loss as it applies to her lymphedema is reduction of overall vol of R UE, most sig change in R upper arm, despite this she c/o a tight fitment of current garments requiring A to don.   I advise an approx 6 mo f/u for RA pending when in need for new garments, also to cont to monitor for R LE edema concerns pt has.  Will assess this at next RA or earlier prn.     Goals:  ST. Pt to maintain reduced volume of R upper arm over 6 wks  2. Pt to maintain daily and nighttime garments   3. Reduce PN L UE <4/10 w/ all activity within 3 wks    LT. Pt to be fitted and order new day and night time gaments within 6 wks  2. Maintain R UE reduced girth measurements thru upper arm over 8 weeks +  3. Reduce PN <1/10 L UE within 6 wks      Lymphedema Life Impact Scale    Lymphedema Life Impact Scale    I. Physical Concerns (Note: if swelling and sx's are the same in both limbs, rate them the same, otherwise rate only the worst limb)     1. The amount of PN associated w/ my lymphedema is: (no PN 0 - severe PN 4) = 2, 3     2. The amount of limb heaviness associated w/ my lymphedema is: (no heaviness 0 - extremely 4) = 3, 3     3. The amount of skin tightness associated w/ my lymphedema is: (no tightness 0 - extremely 4) = 3, 3     4. The size of my swollen limb(s) seems: (normal 0 - extremely 4) = 3, 2     5. Lymphedema affects the movement of my swollen limb(s): (normal 0 - extremely 4) = 4, 3     6. The strength in my swollen limb(s) is: (normal 0 - extremely weak 4) = 3, 2    II. Psychosocial Concerns     7.  Lymphedema affects my body image (how I think I look): (not at all 0 - completely 4) = 2, 3     8. Lymphedema affects my socializing w/ others: (no interference 0  - completely 4) = 3, 3     9. Lymphedema affects my intimate relations w/ spouse/partner (Rate 0 if n/a): (no interference 0 - completely 4) = 3, 3     10. Lymphedema 'gets me down' (I.e., feelings of depression/frustration/anger due to lymph): (never 0 - constant 4) = 2, 2     11. I must rely on others for help due to my lymphedema: (not at all 0 - completely 4) = 4, 3     12. I know what to do to manage my lymphedema: (good understanding 0 - no understanding 4) = 0, 1    III. Functional Concerns     13. Lymphedema affects my ability to perform self care (I.e, eating/dressing/hygiene): (no interference 0 - completely 4) = 4, 3     14. Lymphedema affects my ability to perform routine home/work related activity): (no interference 0 - completely 4) = 4, 3     15. Lymphedema affects my performance of preferred leisure activity: (no interference 0 - completely 4) = 4, 3     16. Lymphedema affects the proper fit of clothing/shoes: (fits normal 0 - unable to wear 4) = 3, 3     17. Lymphedema affects my sleep: (no interference 0 - completely 4) = 4, 2    IV. Infection Occurrence     18. In the past year, I have become ill w/ an infection in my swollen limb requiring oral antibiotics or hospitalization: 0, 0  0,    1x,    2x,    3x,    4+                                  Total Score: 45  Score: 51, 45  % Impairment: 71%, 63%  Infection Occurrence: 0, 0        PLAN:  Patient would benefit from: skilled physical therapy  Planned therapy interventions: manual therapy, massage, compression, home exercise program and patient education  Frequency: 1x/wk  Duration in visits: 1  Duration in weeks: 1  Plan of Care beginning date: 9/9/2024  Plan of Care expiration date: 9/16/2024  Treatment plan discussed with: patient       Precautions: Hx Breast CA, R UE  lymphedema      Manuals 9-9-24                                                                                          Neuro Re-Ed                                                                                                        Ther Ex             Wall walk flex HEP            Wall walk abd HEP            Doorway str HEP            Pendulums HEP                                                                                                       Ther Activity                                       Gait Training                                       Modalities

## 2024-10-01 ENCOUNTER — OFFICE VISIT (OUTPATIENT)
Dept: URGENT CARE | Facility: CLINIC | Age: 69
End: 2024-10-01
Payer: MEDICARE

## 2024-10-01 VITALS
SYSTOLIC BLOOD PRESSURE: 110 MMHG | RESPIRATION RATE: 18 BRPM | HEART RATE: 76 BPM | TEMPERATURE: 97.8 F | OXYGEN SATURATION: 98 % | DIASTOLIC BLOOD PRESSURE: 62 MMHG

## 2024-10-01 DIAGNOSIS — M1A.9XX0 CHRONIC GOUT INVOLVING TOE OF LEFT FOOT WITHOUT TOPHUS, UNSPECIFIED CAUSE: ICD-10-CM

## 2024-10-01 DIAGNOSIS — L24.7 IRRITANT CONTACT DERMATITIS DUE TO PLANTS, EXCEPT FOOD: Primary | ICD-10-CM

## 2024-10-01 PROCEDURE — 99213 OFFICE O/P EST LOW 20 MIN: CPT | Performed by: NURSE PRACTITIONER

## 2024-10-01 PROCEDURE — G0463 HOSPITAL OUTPT CLINIC VISIT: HCPCS | Performed by: NURSE PRACTITIONER

## 2024-10-01 RX ORDER — COLCHICINE 0.6 MG/1
1 CAPSULE ORAL 2 TIMES DAILY
COMMUNITY
Start: 2024-07-31

## 2024-10-01 RX ORDER — TRIAMCINOLONE ACETONIDE 5 MG/G
CREAM TOPICAL 2 TIMES DAILY
Qty: 15 G | Refills: 1 | Status: SHIPPED | OUTPATIENT
Start: 2024-10-01

## 2024-10-01 RX ORDER — THIAMINE HCL 100 MG
TABLET ORAL
COMMUNITY

## 2024-10-01 NOTE — PATIENT INSTRUCTIONS
Patient Education     Contact dermatitis   The Basics   Written by the doctors and editors at [facility]   What is dermatitis? -- Dermatitis is a type of skin rash that can happen after your skin touches something that irritates it or something you are allergic to.  Things that irritate the skin can be found in products that you use every day, such as soaps or cleansers. Some of the things that can cause skin allergies include:   Certain medicines, perfumes, or cosmetics   The metal in some kinds of jewelry   Plants, such as poison ivy and poison oak  Sometimes, you can develop a rash the first time you touch something. But it is also possible to get a rash from something that you have used before without any problems.  What other symptoms should I watch for? -- If you have a rash, your skin might be dry, itchy, or cracked (picture 1). In people with light skin, the rash is often red. In people with darker skin, it might appear purple, brown, gray, or black (picture 2). If your rash is caused by an allergy, you might also have some swelling or blisters where you have the rash.  Severe symptoms include:   Pain   Widespread swelling   Blisters, oozing, or crusting of the skin  Is there anything I can do on my own? -- Yes. You can:   Avoid using or touching whatever might have caused your rash.   Protect your skin from anything that might irritate it or cause an allergy. For example, wear gloves if you need to work with harsh soaps.   Use cool or warm water, not hot, for baths and showers. You can also try a special kind of bath called an oatmeal bath.   Try using soothing skin products to help with the itching and discomfort. Examples include thick moisturizing cream or petroleum jelly. Put this on your skin right after you get out of the bath or shower and after washing your hands.   Avoid scratching your skin. It might help to:   Wear cotton gloves at night.   Keep your nails short and clean.   Cover the parts of your  skin that itch.  How are skin rashes treated? -- Your doctor might prescribe different treatments or medicines to help your rash heal. These can include:   Steroid creams and ointments - These go on the skin, and they relieve itching and redness.   Steroid pills - You might need to take these for a short time if your rash is severe. But your doctor or nurse will want to take you off of the steroid pills as soon as possible. Even though these medicines help, they can also cause problems of their own.   Wet or damp dressings - These can be helpful for skin that is crusting or oozing. To use a wet or damp dressing, you need to wear 2 layers of clothing. First, put on a layer of damp cotton clothes over your rash. Then, put on a layer of dry clothes on top of the damp ones. People who need these dressings often wear them at night when they sleep.  When should I call the doctor? -- Call your doctor or nurse for advice if:   You have a rash that does not go away within 2 weeks.   Your rash gets worse or spreads over large parts of your body.   You have signs of infection like swelling, warmth, pain, or fever.  All topics are updated as new evidence becomes available and our peer review process is complete.  This topic retrieved from [employer] on: Feb 26, 2024.  Topic 56335 Version 12.0  Release: 32.2.4 - C32.56  © 2024 UpToDate, Inc. and/or its affiliates. All rights reserved.  picture 1: Chronic irritant contact dermatitis     If you have dermatitis, your skin might be red, dry, itchy, or cracked.  Graphic 726843 Version 2.0  picture 2: Dermatitis caused by nickel allergy     This person has an allergy to nickel, a type of metal. They have dermatitis where the button of their jeans touched their skin.  Graphic 480654 Version 1.0  Consumer Information Use and Disclaimer   Disclaimer: This generalized information is a limited summary of diagnosis, treatment, and/or medication information. It is not meant to be comprehensive  and should be used as a tool to help the user understand and/or assess potential diagnostic and treatment options. It does NOT include all information about conditions, treatments, medications, side effects, or risks that may apply to a specific patient. It is not intended to be medical advice or a substitute for the medical advice, diagnosis, or treatment of a health care provider based on the health care provider's examination and assessment of a patient's specific and unique circumstances. Patients must speak with a health care provider for complete information about their health, medical questions, and treatment options, including any risks or benefits regarding use of medications. This information does not endorse any treatments or medications as safe, effective, or approved for treating a specific patient. UpToDate, Inc. and its affiliates disclaim any warranty or liability relating to this information or the use thereof.The use of this information is governed by the Terms of Use, available at https://www.M[employer].com/en/know/clinical-effectiveness-terms. 2024© UpToDate, Inc. and its affiliates and/or licensors. All rights reserved.  Copyright   © 2024 UpToDate, Inc. and/or its affiliates. All rights reserved.

## 2024-10-01 NOTE — PROGRESS NOTES
[facility]        NAME: Shonna Plascencia is a 69 y.o. female  : 1955    MRN: [id number]  DATE: 2024  TIME: 10:14 AM    Assessment and Plan   Irritant contact dermatitis due to plants, except food [L24.7]  1. Irritant contact dermatitis due to plants, except food  triamcinolone (KENALOG) 0.5 % cream      2. Chronic gout involving toe of left foot without tophus, unspecified cause          Localized contact derm  - will use kenalog - sent to pharmacy   Continue current management for gout with pcp and at home   F/u with specialists as scheduled.    Patient Instructions     Follow up with PCP in 3-5 days.  Proceed to  ER if symptoms worsen.    Chief Complaint     Chief Complaint   Patient presents with    Rash     Rash to left arm since few weeks ago    Shortness of Breath     Patient with SOB for the past 4 days         History of Present Illness   Shonna Plascencia presents to the clinic c/o    Rash (Rash to left arm since few weeks ago)  Shortness of Breath (Patient with SOB for the past 4 days)  Patient states was doing yard work a few weeks ago and noticed the rash forming on her arm.  Was pretty sure it started as a contact to a poison ivy plant in her yard.  She states she has been using tea tree oil and aloe.  Does not use any other topicals that she states they never work for her.    Also c/o gout flare for the past month - has been using soaks and that has been helping - just wants to have it looked at   Concerned due to her underlying medical conditions - does not want these items to cause a flare or increase risk of infection to anything        Review of Systems   Review of Systems   All other systems reviewed and are negative.        Current Medications     Long-Term Medications   Medication Sig Dispense Refill    ascorbic acid (VITAMIN C) 1000 MG tablet Take 1,000 mg by mouth daily      baclofen 10 mg tablet Take 10 mg by mouth 2 (two) times a day as needed      diclofenac  (VOLTAREN) 75 mg EC tablet Take 75 mg by mouth 2 (two) times a day as needed      Fluticasone-Salmeterol (Advair Diskus) 250-50 mcg/dose inhaler Inhale 1 puff 2 (two) times a day Rinse mouth after use. 60 blister 0    levothyroxine 50 mcg tablet Take 50 mcg by mouth daily      lidocaine (LIDODERM) 5 % Place 1 patch on the skin every 24 hours      Omega-3 Fatty Acids (fish oil) 1,000 mg Take 2 g by mouth daily      ondansetron (ZOFRAN-ODT) 4 mg disintegrating tablet Take 4 mg by mouth      PreviDent 5000 Dry Mouth 1.1 % GEL BRUSH WITH TOOTH PASTE TWICE DAILY DO NOT RINSE AFTERWARDS.      Restasis 0.05 % ophthalmic emulsion INSTILL 1 DROP IN BOTH EYES TWICE DAILY      triamcinolone (KENALOG) 0.5 % cream Apply topically 2 (two) times a day 15 g 1    ALPRAZolam (XANAX) 0.25 mg tablet  (Patient not taking: Reported on 12/26/2023)      carbamide peroxide (DEBROX) 6.5 % otic solution Administer 5 drops into the left ear 2 (two) times a day 15 mL 0    Cholecalciferol 25 MCG (1000 UT) tablet Take by mouth      Colchicine 0.6 MG CAPS Take 1 capsule by mouth 2 (two) times a day (Patient not taking: Reported on 10/1/2024)      famotidine (PEPCID) 40 MG tablet Take 1 tablet (40 mg total) by mouth daily at bedtime 30 tablet 3    indomethacin (INDOCIN) 25 mg capsule TAKE 1 CAPSULE BY MOUTH TWICE DAILY WITH FOOD OR MILK (Patient not taking: Reported on 11/22/2023)      loteprednol etabonate (LOTEMAX) 0.5 % ophthalmic suspension 1 drop 4 (four) times a day (Patient not taking: Reported on 11/22/2023)      meloxicam (MOBIC) 15 mg tablet Take by mouth      meloxicam (MOBIC) 15 mg tablet Take 1 tablet (15 mg total) by mouth daily (Patient not taking: Reported on 10/21/2021) 30 tablet 0    methocarbamol (ROBAXIN) 500 mg tablet Take by mouth      sertraline (ZOLOFT) 100 mg tablet Take 100 mg by mouth daily         Current Allergies     Allergies as of 10/01/2024 - Reviewed 10/01/2024   Allergen Reaction Noted    Benzethonium chloride  Shortness Of Breath 01/17/2023    Enbucrilate Shortness Of Breath 07/20/2023    Fentanyl Shortness Of Breath 05/21/2014    Formaldehyde Headache, Other (See Comments), and Shortness Of Breath 04/14/2011    Petrolatum Other (See Comments) and Shortness Of Breath 11/09/2020    Chocolate - food allergy Headache 11/22/2023    Monosodium glutamate - food allergy GI Intolerance 04/14/2011            The following portions of the patient's history were reviewed and updated as appropriate: allergies, current medications, past family history, past medical history, past social history, past surgical history and problem list.    Objective   /62   Pulse 76   Temp 97.8 °F (36.6 °C) (Tympanic)   Resp 18   SpO2 98%        Physical Exam     Physical Exam  Vitals and nursing note reviewed.   Constitutional:       Appearance: Normal appearance. She is well-developed.   HENT:      Head: Normocephalic and atraumatic.      Right Ear: Hearing, tympanic membrane, ear canal and external ear normal.      Left Ear: Hearing, tympanic membrane, ear canal and external ear normal.      Nose: Nose normal.      Mouth/Throat:      Lips: Pink.      Mouth: Mucous membranes are moist.      Pharynx: Oropharynx is clear.   Eyes:      General: Lids are normal.      Conjunctiva/sclera: Conjunctivae normal.      Pupils: Pupils are equal, round, and reactive to light.   Cardiovascular:      Rate and Rhythm: Normal rate and regular rhythm.      Heart sounds: Normal heart sounds, S1 normal and S2 normal.   Pulmonary:      Effort: Pulmonary effort is normal.      Breath sounds: Normal breath sounds.   Abdominal:      General: Abdomen is flat. Bowel sounds are normal.      Palpations: Abdomen is soft.   Musculoskeletal:         General: Normal range of motion.      Cervical back: Full passive range of motion without pain, normal range of motion and neck supple.   Skin:     General: Skin is warm and dry.      Findings: Rash present. Rash is papular.           Neurological:      General: No focal deficit present.      Mental Status: She is alert and oriented to person, place, and time.   Psychiatric:         Mood and Affect: Mood normal.         Speech: Speech normal.         Behavior: Behavior normal. Behavior is cooperative.         Thought Content: Thought content normal.         Judgment: Judgment normal.

## 2024-12-26 ENCOUNTER — OFFICE VISIT (OUTPATIENT)
Dept: URGENT CARE | Facility: CLINIC | Age: 69
End: 2024-12-26
Payer: MEDICARE

## 2024-12-26 VITALS
HEART RATE: 64 BPM | RESPIRATION RATE: 16 BRPM | DIASTOLIC BLOOD PRESSURE: 64 MMHG | TEMPERATURE: 97.9 F | SYSTOLIC BLOOD PRESSURE: 100 MMHG | OXYGEN SATURATION: 98 %

## 2024-12-26 DIAGNOSIS — J01.10 ACUTE NON-RECURRENT FRONTAL SINUSITIS: Primary | ICD-10-CM

## 2024-12-26 PROCEDURE — 99213 OFFICE O/P EST LOW 20 MIN: CPT | Performed by: NURSE PRACTITIONER

## 2024-12-26 PROCEDURE — G0463 HOSPITAL OUTPT CLINIC VISIT: HCPCS | Performed by: NURSE PRACTITIONER

## 2024-12-26 RX ORDER — TIZANIDINE 2 MG/1
2 TABLET ORAL 3 TIMES DAILY PRN
COMMUNITY
Start: 2024-12-06

## 2024-12-26 RX ORDER — PREDNISONE 20 MG/1
20 TABLET ORAL 2 TIMES DAILY WITH MEALS
Qty: 10 TABLET | Refills: 0 | Status: SHIPPED | OUTPATIENT
Start: 2024-12-26 | End: 2024-12-31

## 2024-12-26 RX ORDER — ESCITALOPRAM OXALATE 10 MG/1
1 TABLET ORAL DAILY
COMMUNITY
Start: 2024-12-01

## 2024-12-26 RX ORDER — ROSUVASTATIN CALCIUM 10 MG/1
10 TABLET, COATED ORAL DAILY
COMMUNITY
Start: 2024-10-28 | End: 2025-10-28

## 2024-12-26 NOTE — PROGRESS NOTES
"  [facility]        NAME: Shonna Plascencia is a 69 y.o. female  : 1955    MRN: [id number]  DATE: 2024  TIME: 10:48 AM    Assessment and Plan   Acute non-recurrent frontal sinusitis [J01.10]  1. Acute non-recurrent frontal sinusitis  predniSONE 20 mg tablet        Will start course of prednisone.  Discussed chronicity and encouraged to follow-up with PCP especially due to her abnormal lab work recently.  Patient verbalized understanding and is in agreement with plan.    Patient Instructions     Follow up with PCP in 3-5 days.  Proceed to  ER if symptoms worsen.    Chief Complaint     Chief Complaint   Patient presents with    Cold Like Symptoms     Patient states that for 2 months she has had a cough, PND, \"phlegm\", head and nose congestion and fatigue.          History of Present Illness   Shonna Plascencia presents to the clinic c/o    Patient presents office with complaints of postnasal drip and sinus pressure for the past 2 months.  She states she did discuss with her family doctor that she did also have a cough and was feeling rundown however she states it was not addressed.  Patient was also seen in ER had elevated white blood cells and was to follow-up with repeat lab work.  She has not gotten that the lab work done yet.  She is doing a sinus rinse she does not know which one it is.  She denies using any nasal sprays.  She states she does take daily Zyrtec.  She complains of not having the ability to think and her head is very foggy due to her current illness        Review of Systems   Review of Systems   All other systems reviewed and are negative.        Current Medications     Long-Term Medications   Medication Sig Dispense Refill    ascorbic acid (VITAMIN C) 1000 MG tablet Take 1,000 mg by mouth daily      baclofen 10 mg tablet Take 10 mg by mouth 2 (two) times a day as needed      escitalopram (LEXAPRO) 10 mg tablet Take 1 tablet by mouth in the morning      levothyroxine 50 mcg " tablet Take 50 mcg by mouth daily      lidocaine (LIDODERM) 5 % Place 1 patch on the skin every 24 hours      Omega-3 Fatty Acids (fish oil) 1,000 mg Take 2 g by mouth daily      ondansetron (ZOFRAN-ODT) 4 mg disintegrating tablet Take 4 mg by mouth      PreviDent 5000 Dry Mouth 1.1 % GEL BRUSH WITH TOOTH PASTE TWICE DAILY DO NOT RINSE AFTERWARDS.      Restasis 0.05 % ophthalmic emulsion INSTILL 1 DROP IN BOTH EYES TWICE DAILY      rosuvastatin (CRESTOR) 10 MG tablet Take 10 mg by mouth daily      tiZANidine (ZANAFLEX) 2 mg tablet Take 2 mg by mouth 3 (three) times a day as needed      ALPRAZolam (XANAX) 0.25 mg tablet  (Patient not taking: Reported on 12/26/2024)      carbamide peroxide (DEBROX) 6.5 % otic solution Administer 5 drops into the left ear 2 (two) times a day 15 mL 0    Cholecalciferol 25 MCG (1000 UT) tablet Take by mouth      Colchicine 0.6 MG CAPS Take 1 capsule by mouth 2 (two) times a day (Patient not taking: Reported on 12/26/2024)      diclofenac (VOLTAREN) 75 mg EC tablet Take 75 mg by mouth 2 (two) times a day as needed (Patient not taking: Reported on 12/26/2024)      famotidine (PEPCID) 40 MG tablet Take 1 tablet (40 mg total) by mouth daily at bedtime 30 tablet 3    Fluticasone-Salmeterol (Advair Diskus) 250-50 mcg/dose inhaler Inhale 1 puff 2 (two) times a day Rinse mouth after use. 60 blister 0    indomethacin (INDOCIN) 25 mg capsule TAKE 1 CAPSULE BY MOUTH TWICE DAILY WITH FOOD OR MILK (Patient not taking: Reported on 11/22/2023)      loteprednol etabonate (LOTEMAX) 0.5 % ophthalmic suspension 1 drop 4 (four) times a day (Patient not taking: Reported on 11/22/2023)      meloxicam (MOBIC) 15 mg tablet Take by mouth      meloxicam (MOBIC) 15 mg tablet Take 1 tablet (15 mg total) by mouth daily (Patient not taking: Reported on 10/21/2021) 30 tablet 0    methocarbamol (ROBAXIN) 500 mg tablet Take by mouth      sertraline (ZOLOFT) 100 mg tablet Take 100 mg by mouth daily      triamcinolone  (KENALOG) 0.5 % cream Apply topically 2 (two) times a day (Patient not taking: Reported on 12/26/2024) 15 g 1       Current Allergies     Allergies as of 12/26/2024 - Reviewed 12/26/2024   Allergen Reaction Noted    Benzethonium chloride Shortness Of Breath 01/17/2023    Enbucrilate Shortness Of Breath 07/20/2023    Fentanyl Shortness Of Breath 05/21/2014    Formaldehyde Headache, Other (See Comments), and Shortness Of Breath 04/14/2011    Petrolatum Other (See Comments) and Shortness Of Breath 11/09/2020    Chocolate - food allergy Headache 11/22/2023    Monosodium glutamate - food allergy GI Intolerance 04/14/2011            The following portions of the patient's history were reviewed and updated as appropriate: allergies, current medications, past family history, past medical history, past social history, past surgical history and problem list.    Objective   /64   Pulse 64   Temp 97.9 °F (36.6 °C) (Tympanic)   Resp 16   SpO2 98%        Physical Exam     Physical Exam  Vitals and nursing note reviewed.   Constitutional:       Appearance: Normal appearance. She is well-developed.   HENT:      Head: Normocephalic and atraumatic.      Right Ear: Hearing, tympanic membrane, ear canal and external ear normal.      Left Ear: Hearing, tympanic membrane, ear canal and external ear normal.      Nose: Mucosal edema and congestion present.      Right Sinus: Maxillary sinus tenderness and frontal sinus tenderness present.      Left Sinus: Maxillary sinus tenderness and frontal sinus tenderness present.      Mouth/Throat:      Lips: Pink.      Mouth: Mucous membranes are moist.      Pharynx: Oropharynx is clear. Postnasal drip present.      Comments: Mucus clear in color  Eyes:      General: Lids are normal.      Extraocular Movements: Extraocular movements intact.      Conjunctiva/sclera: Conjunctivae normal.      Pupils: Pupils are equal, round, and reactive to light.   Cardiovascular:      Rate and Rhythm:  Normal rate and regular rhythm.      Pulses: Normal pulses.      Heart sounds: Normal heart sounds, S1 normal and S2 normal.   Pulmonary:      Effort: Pulmonary effort is normal.      Breath sounds: Normal breath sounds.   Abdominal:      General: Abdomen is flat. Bowel sounds are normal.      Palpations: Abdomen is soft.   Musculoskeletal:         General: Normal range of motion.      Cervical back: Full passive range of motion without pain, normal range of motion and neck supple.   Skin:     General: Skin is warm and dry.   Neurological:      General: No focal deficit present.      Mental Status: She is alert and oriented to person, place, and time.   Psychiatric:         Mood and Affect: Mood normal.         Speech: Speech normal.         Behavior: Behavior normal. Behavior is cooperative.         Thought Content: Thought content normal.         Judgment: Judgment normal.

## 2025-02-04 ENCOUNTER — EVALUATION (OUTPATIENT)
Dept: PHYSICAL THERAPY | Facility: CLINIC | Age: 70
End: 2025-02-04
Payer: MEDICARE

## 2025-02-04 DIAGNOSIS — I89.0 LYMPHEDEMA: Primary | ICD-10-CM

## 2025-02-04 PROCEDURE — 97163 PT EVAL HIGH COMPLEX 45 MIN: CPT | Performed by: PHYSICAL THERAPIST

## 2025-02-04 NOTE — LETTER
2025    Tristen Matos MD  [address] [facility] [address]    Patient: Shonna Plascencia   YOB: 1955   Date of Visit: 2025     Encounter Diagnosis     ICD-10-CM    1. Lymphedema  I89.0           Dear Dr. Matos:    Thank you for your recent referral of Shonna Plascencia. Please review the attached evaluation summary from Shonna's recent visit.     Please verify that you agree with the plan of care by signing the attached order.     If you have any questions or concerns, please do not hesitate to call.     I sincerely appreciate the opportunity to share in the care of one of your patients and hope to have another opportunity to work with you in the near future.       Sincerely,    Angel Soares, PT      Referring Provider:      I certify that I have read the below Plan of Care and certify the need for these services furnished under this plan of treatment while under my care.                    Tristen Matos MD  [address] [facility] [address] [address]  Via Fax: [phone]          PT Evaluation     Today's date: 2025  Patient name: Shonna Plascencia  : 1955  MRN: [id number]  Referring provider: Tristen Matos MD  Dx:   Encounter Diagnosis     ICD-10-CM    1. Lymphedema  I89.0                    Lymphedema Physical Assessment    SUBJECTIVE EVALUATION:  History of present illness:  Pt presents for examination stating her R arm has been pretty good, well managed w/ use of garments previously fitted for.   Citing a PN in L axilla and upper medial arm cont'd as previously seen for but also PN in L c/s thru scalene and supraclavicular fossa regions.  PN worse overnight, disrupts her sleep. Reaching or lifting items she ends up dropping items more frequently.  Feels like  strength is reduced. - This all has cont'd.  C/o limited B shldr AROM.  Requires A to don/doff garments for R UE.  Changing clothes can be an issue at times.  Pt is R hand dominant but uses  L UE more to mind her lymphedema in R UE which has become more a challenge due to L UE PN.  Pt has cont'd w/ PN management and admits a variety of medications to address w/ also injections to her c/s.  Pt has had mammogram in '[facility] in [facility], also US L breast/axilla w/ indication for enlarged L axillary lymph node but no malignancy suggested.   Pt reports a of sense of heaviness in R LE, feels R>L thigh girth is notable.     Pain (0-10): 3/10 - 5/10   Location: Upper arm/axilla L upper quadrant    Social Support:    Lives in: home, 2 story   Lives with: spouse    Occupation:  Employment status:  Retired  Physical job demands: n/a    OBJECTIVE:    Pulse: (0 no, 1+ diminished, 2+ mildly diminished, 3+ normal, 4 bounding)    Radial: 3+      Capillary Refill (=< 3 seconds is normal): normal    Palpation: Good overall mobility of R UE skin w/ mild edema presence well controlled at current state w/ use of daytime and nighttime garments    Pitting: Trace    Stemmer's Sign: Neg    Gait assessment: WNL    Transfer status: B UE A    Ability to don/doff clothing/garments: I    LE ROM: n/a  Knee flexion limitation:  Ankle DF/PF limitation:    UE ROM: WNL R UE - mild guarding/tension at end range OH L and R UE.   Shldr OH flex:  Shldr OH abd:  Shldr ER:  Shldr IR:  Elbow flex/ext:  Wrist flex/ext:    Strength:  UE:  LE:    Self management:  Home compression pump: no  Compression stockings: yes, day and night time  Lymphedema exercise: yes    Skin Assessment:  Skin mobility: Good t/o R UE  Fibrosis (0 normal - 4 >severe): 0-1  Erythema scale (1 very faint, 2 faint, 3 bright, 4 very bright): 1  Hemosiderin staining present:  Blister present: no   Location:   Color:   Size:  Wound present: no   Location:   Color:   Size:    Visualization:   Bony prominences: good  Tendon pathways: good thru R hand  Joint creases: good, minimal loss of visualization    I did visually inspect R and L LE to find no pitting into either LE, normal  pulse and cap refill.  R thigh does appear slightly larger than L however this is skewed slightly by rolled pant legs.  Advise pt cont to monitor, will f/u prn.     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  She has ongoing PN c/o of L>R UE.  She has cont'd unplanned wt loss she in part attributes to her dysphagia, Now 123 lbs- reported.   Primary concern for wt loss as it applies to her lymphedema is reduction of overall vol of R UE, most sig change in R upper arm at previous assessment 24, now moderating and holding in volume per measurement this day.   I advise an approx 6 mo f/u for RA pending when in need for new garments, also to cont to monitor for R LE edema concerns pt has as RA this day does indicate some mild edema presence medial aspect of knee and distal to mid thigh in comparison to L.  Will assess this at next RA or earlier prn if necessary.     Goals:  ST. Pt to maintain reduced volume of R upper arm over 6 months  2. Pt to maintain daily and nighttime garments   3. Reduce PN L UE <4/10 w/ all activity within 3 months    LT. Pt to order new day and night time gaments within  6 months  2. Maintain R UE reduced girth measurements thru upper arm over 6 months  3. Reduce PN <1/10 L UE within 6 months    Lymphedema life impact scale    Score: 51, 45, 49  % Impairment: 71%, 63%, 68%  Infection Occurrence: 0        PLAN:  Patient would benefit from: skilled physical therapy  Planned therapy interventions: manual therapy, massage, compression, home exercise program and patient education  Frequency: 1x/wk  Duration in visits: 1  Duration in weeks: 1  Plan of Care beginning date: 2/4/2025  Plan of Care expiration date: 2/11/2025  Treatment plan discussed with: patient       Precautions: Hx Breast CA, R UE lymphedema      Manuals 9-9-24 2-4-25                                     measurements  [facility]                                                  Neuro Re-Ed                                                                                                        Ther Ex             Wall walk flex HEP            Wall walk abd HEP            Doorway str HEP            Pendulums HEP                                                                                                       Ther Activity                                       Gait Training                                       Modalities

## 2025-02-04 NOTE — PROGRESS NOTES
PT Evaluation     Today's date: 2025  Patient name: Shonna Plascencia  : 1955  MRN: [id number]  Referring provider: Tristen Matos MD  Dx:   Encounter Diagnosis     ICD-10-CM    1. Lymphedema  I89.0                    Lymphedema Physical Assessment    SUBJECTIVE EVALUATION:  History of present illness:  Pt presents for examination stating her R arm has been pretty good, well managed w/ use of garments previously fitted for.   Citing a PN in L axilla and upper medial arm cont'd as previously seen for but also PN in L c/s thru scalene and supraclavicular fossa regions.  PN worse overnight, disrupts her sleep. Reaching or lifting items she ends up dropping items more frequently.  Feels like  strength is reduced. - This all has cont'd.  C/o limited B shldr AROM.  Requires A to don/doff garments for R UE.  Changing clothes can be an issue at times.  Pt is R hand dominant but uses L UE more to mind her lymphedema in R UE which has become more a challenge due to L UE PN.  Pt has cont'd w/ PN management and admits a variety of medications to address w/ also injections to her c/s.  Pt has had mammogram in  in [facility], also US L breast/axilla w/ indication for enlarged L axillary lymph node but no malignancy suggested.   Pt reports a of sense of heaviness in R LE, feels R>L thigh girth is notable.     Pain (0-10): 3/10 - 5/10   Location: Upper arm/axilla L upper quadrant    Social Support:    Lives in: home, 2 story   Lives with: spouse    Occupation:  Employment status:  Retired  Physical job demands: n/a    OBJECTIVE:    Pulse: (0 no, 1+ diminished, 2+ mildly diminished, 3+ normal, 4 bounding)    Radial: 3+      Capillary Refill (=< 3 seconds is normal): normal    Palpation: Good overall mobility of R UE skin w/ mild edema presence well controlled at current state w/ use of daytime and nighttime garments    Pitting: Trace    Stemmer's Sign: Neg    Gait assessment: WNL    Transfer status: B UE  A    Ability to don/doff clothing/garments: I    LE ROM: n/a  Knee flexion limitation:  Ankle DF/PF limitation:    UE ROM: WNL R UE - mild guarding/tension at end range OH L and R UE.   Shldr OH flex:  Shldr OH abd:  Shldr ER:  Shldr IR:  Elbow flex/ext:  Wrist flex/ext:    Strength:  UE:  LE:    Self management:  Home compression pump: no  Compression stockings: yes, day and night time  Lymphedema exercise: yes    Skin Assessment:  Skin mobility: Good t/o R UE  Fibrosis (0 normal - 4 >severe): 0-1  Erythema scale (1 very faint, 2 faint, 3 bright, 4 very bright): 1  Hemosiderin staining present:  Blister present: no   Location:   Color:   Size:  Wound present: no   Location:   Color:   Size:    Visualization:   Bony prominences: good  Tendon pathways: good thru R hand  Joint creases: good, minimal loss of visualization    I did visually inspect R and L LE to find no pitting into either LE, normal pulse and cap refill.  R thigh does appear slightly larger than L however this is skewed slightly by rolled pant legs.  Advise pt cont to monitor, will f/u prn.     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  She  has ongoing PN c/o of L>R UE.  She has cont'd unplanned wt loss she in part attributes to her dysphagia, Now 123 lbs- reported.   Primary concern for wt loss as it applies to her lymphedema is reduction of overall vol of R UE, most sig change in R upper arm at previous assessment 24, now moderating and holding in volume per measurement this day.   I advise an approx 6 mo f/u for RA pending when in need for new garments, also to cont to monitor for R LE edema concerns pt has as RA this day does indicate some mild edema presence medial aspect of knee and distal to mid thigh in comparison to L.  Will assess this at next RA or earlier prn if necessary.     Goals:  ST. Pt to maintain reduced volume of R upper arm over 6 months  2. Pt to maintain daily and nighttime garments   3. Reduce PN L UE <4/10 w/ all activity within 3 months    LT. Pt to order new day and night time gaments within 6 months  2. Maintain R UE reduced girth measurements thru upper arm over 6 months  3. Reduce PN <1/10 L UE within 6 months    Lymphedema life impact scale    Score: 51, 45, 49  % Impairment: 71%, 63%, 68%  Infection Occurrence: 0        PLAN:  Patient would benefit from: skilled physical therapy  Planned therapy interventions: manual therapy, massage, compression, home exercise program and patient education  Frequency: 1x/wk  Duration in visits: 1  Duration in weeks: 1  Plan of Care beginning date: 2025  Plan of Care expiration date: 2025  Treatment plan discussed with: patient       Precautions: Hx Breast CA, R UE lymphedema      Manuals 24                                     measurements  [facility]                                                  Neuro Re-Ed                                                                                                        Ther Ex             Wall walk flex HEP            Wall walk abd HEP            Doorway str HEP            Pendulums HEP                                                                                                        Ther Activity                                       Gait Training                                       Modalities

## 2025-03-30 NOTE — PROGRESS NOTES
Daily Note     Today's date: 10/3/2022  Patient name: Santiago Farr  : 1955  MRN: [id number]  Referring provider: Erica Lynch MD  Dx:   Encounter Diagnosis     ICD-10-CM    1  Lymphedema  I89 0    2  Chronic left shoulder pain  M25 512     G89 29                   Subjective: Pt 2/10 PN co this day, mild  Objective: See treatment diary below      Assessment: Tolerated treatment well  Patient would benefit from continued PT  Initiated TB resistance TE this day to facilitate postural muscle activation and strength building  Good response  Also cont to see less tension thru shldrs, t/s and c/s w/ improved melvina to manuals and less PN c/o   R UE edema cont to reduce mildly, pt admitting her "garments are fitting looser "    Plan: Continue per plan of care           Precautions:       Manuals 9-23 9-28 9-30 10-3   9-2 9-12 9-16 9-21   MLD R UE jph jph jph jph   jph jph jph jph   MLD L axilla + upper arm jph jph jph jph   jph jph jph jph   MFR L post shldr jph jph jph jph   jph jph jph jph   R UE compression jph night jph night jph day jph day   jph day jph day jph day jph day   Gentle scap mobs B - seated jph jph jph jph   jph jph jph jph   PROM L shldr jph jph jph jph   jph jph jph jph   Neuro Re-Ed                                                                                                        Ther Ex             Seated pec str             Doorway str             Scap retraction    GrnTB 3s x20         B shldr ER    GrnTB x20         B shldr extension    GrnTB x20                                                Ther Activity                                       Gait Training                                       Modalities
lip

## 2025-04-01 ENCOUNTER — APPOINTMENT (OUTPATIENT)
Dept: PHYSICAL THERAPY | Facility: CLINIC | Age: 70
End: 2025-04-01
Payer: MEDICARE

## 2025-04-02 ENCOUNTER — OFFICE VISIT (OUTPATIENT)
Dept: URGENT CARE | Facility: CLINIC | Age: 70
End: 2025-04-02
Payer: MEDICARE

## 2025-04-02 ENCOUNTER — EVALUATION (OUTPATIENT)
Dept: PHYSICAL THERAPY | Facility: CLINIC | Age: 70
End: 2025-04-02
Payer: MEDICARE

## 2025-04-02 VITALS
BODY MASS INDEX: 23.03 KG/M2 | RESPIRATION RATE: 15 BRPM | OXYGEN SATURATION: 98 % | SYSTOLIC BLOOD PRESSURE: 132 MMHG | DIASTOLIC BLOOD PRESSURE: 82 MMHG | HEIGHT: 61 IN | TEMPERATURE: 97.8 F | HEART RATE: 68 BPM | WEIGHT: 122 LBS

## 2025-04-02 DIAGNOSIS — I89.0 LYMPHEDEMA: Primary | ICD-10-CM

## 2025-04-02 DIAGNOSIS — L30.9 FOOT DERMATITIS: Primary | ICD-10-CM

## 2025-04-02 PROCEDURE — G0463 HOSPITAL OUTPT CLINIC VISIT: HCPCS | Performed by: PHYSICIAN ASSISTANT

## 2025-04-02 PROCEDURE — 97162 PT EVAL MOD COMPLEX 30 MIN: CPT | Performed by: PHYSICAL THERAPIST

## 2025-04-02 PROCEDURE — 99212 OFFICE O/P EST SF 10 MIN: CPT | Performed by: PHYSICIAN ASSISTANT

## 2025-04-02 RX ORDER — KETOCONAZOLE 20 MG/G
CREAM TOPICAL 2 TIMES DAILY
Qty: 60 G | Refills: 0 | Status: SHIPPED | OUTPATIENT
Start: 2025-04-02

## 2025-04-02 NOTE — PATIENT INSTRUCTIONS
Stop tea tree oil, aloe and Epsom salt soaks.    Wash foot daily with plain soap and water and dry with separate towel.  Use clean towel daily.  Apply topical antifungal cream as instructed.    Because your podiatrist is outside of the [facility] network, I do recommend that you take pictures of your left foot to document current rash so you can show your podiatrist.    Apply topical antifungal cream as instructed.

## 2025-04-02 NOTE — PROGRESS NOTES
PT Re-Evaluation     Today's date: 2025  Patient name: Shonna Plascencia  : 1955  MRN: [id number]  Referring provider: Tristen Matos MD  Dx:   Encounter Diagnosis     ICD-10-CM    1. Lymphedema  I89.0                    Lymphedema Physical Assessment    SUBJECTIVE EVALUATION:  History of present illness:  Pt presents for examination stating her R arm has been pretty good, well managed w/ use of garments previously fitted for but c/o her newest gloves not fitting properly as her glove is not the same size despite using the same measurements as previous orders.     Pt reports a of sense of heaviness in R LE, feels R>L thigh girth is notable, this was reported in Feb assessment and also continues.      Pain (0-10): 3/10 - 5/10   Location: Upper arm/axilla L upper quadrant    Social Support:    Lives in: home, 2 story   Lives with: spouse    Occupation:  Employment status:  Retired  Physical job demands: n/a    OBJECTIVE:    Pulse: (0 no, 1+ diminished, 2+ mildly diminished, 3+ normal, 4 bounding)    Radial: 3+  Dorsal: 3+    Capillary Refill (=< 3 seconds is normal): normal    Palpation: Good overall mobility of R UE skin w/ mild edema presence well controlled at current state w/ use of daytime and nighttime garments    Pitting: Trace    Stemmer's Sign: Neg    Gait assessment: WNL    Transfer status: B UE A    Ability to don/doff clothing/garments: I    LE ROM: n/a  Knee flexion limitation:  Ankle DF/PF limitation:    UE ROM: WNL R UE - mild guarding/tension at end range OH L and R UE.   Shldr OH flex:  Shldr OH abd:  Shldr ER:  Shldr IR:  Elbow flex/ext:  Wrist flex/ext:    Strength:  UE:  LE:    Self management:  Home compression pump: no  Compression stockings: yes, day and night time  Lymphedema exercise: yes    Skin Assessment:  Skin mobility: Good t/o R UE  Fibrosis (0 normal - 4 >severe): 0-1  Erythema scale (1 very faint, 2 faint, 3 bright, 4 very bright): 1  Hemosiderin staining present:  Blister  "present: no   Location:   Color:   Size:  Wound present: no   Location:   Color:   Size:    Visualization:   Bony prominences: good  Tendon pathways: good thru R hand  Joint creases: good, minimal loss of visualization    I did visually inspect R and L LE to find no pitting into either LE, normal pulse and cap refill.  R thigh does appear slightly larger than L however this is skewed slightly by rolled pant legs.  Advise pt cont to monitor, will f/u prn.     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, Oculopharyngeal muscular dystrophy      R UE volume assessment 2414 mL on 2/6/2023  R UE volume  assessment 2204 mL on 2/7/2024  R UE volume assessment 2053 mL on 9/9/2024      UPPER EXTREMITY RIGHT CALCULATIONS      Flowsheet Row Most Recent Value   Volume UE (mL) 2075   Difference from last visit (mL)  -2075   Difference from first visit (mL)  -2075          LOWER EXTREMITY LEFT CALCULATIONS      Flowsheet Row Most Recent Value   Volume LE (mL) 6868   Difference from last visit (mL)  -6868   Difference from first visit (mL)  -6868          LOWER EXTREMITY RIGHT CALCULATIONS      Flowsheet Row Most Recent Value   Volume LE (mL) 7102   Difference from last visit (mL)  -7102   Difference from first visit (mL)  -7102              >> REFER TO FLOW SHEET FOR GIRTH MEASUREMENTS <<    ASSESSMENT:  Assessment details:  Pt presents for RA of chronic R UE lymphedema related to past CA tx after lumpectomy '10 w/ concern over most recent gloves \"not fitting properly\".   She also has cont'd c/o of R LE being larger than her L - this was noted at last assessment in Feb '25.  She has cont'd unplanned wt loss she in part attributes to her dysphagia, " Now 122 lbs- reported.   R UE cont'd w/ moderating and holding in volume per measurement this day.   I advise still the approx 6 mo f/u for RA pending when in need for new garments, schedule for .  Measurements this day do show R>L LE total volume w/ thigh measurements showing the most consistent variance R>L.  Physical assessment however does not suggest need for intervention at this time, instead cont to monitor for R LE edema concerns at 6 mo f/u unless sx's sig worsen before that time.  In regards to garment/glove concerns, pt did cut her tags out, making it difficult to discern when gloves were ordered.  Advised her to avoid this practice.  Given that pt had previous success in sizing w/ same garment measurements I advised avoiding change candy measurement form, will RA at time of garment re-order and make changes as necessary.     Goals:  ST. Pt to maintain reduced volume of R upper arm over 6 months  2. Pt to maintain daily and nighttime garments   3. Reduce PN L UE <4/10 w/ all activity within 3 months    LT. Pt to order new day and night time gaments within 6 months  2. Maintain R UE reduced girth measurements thru upper arm over 6 months  3. Reduce PN <1/10 L UE within 6 months    Lymphedema life impact scale    Score: 51, 45, 49  % Impairment: 71%, 63%, 68%  Infection Occurrence: 0        PLAN:  Patient would benefit from: skilled physical therapy  Planned therapy interventions: manual therapy, massage, compression, home exercise program and patient education  Frequency: 1x/wk  Duration in visits: 1  Duration in weeks: 1  Plan of Care beginning date: 2025  Plan of Care expiration date: 2025  Treatment plan discussed with: patient       Precautions: Hx Breast CA, R UE lymphedema      Manuals 9-24 - 4-4                                    measurements  [facility]          Garment review   [facility]                                    Neuro Re-Ed                                                                                                         Ther Ex             Wall walk flex HEP            Wall walk abd HEP            Doorway str HEP            Pendulums HEP                                                                                                       Ther Activity                                       Gait Training                                       Modalities

## 2025-04-02 NOTE — LETTER
2025    Tristen Matos MD  [address] [facility] [address] [address]    Patient: Shonna Plascencia   YOB: 1955   Date of Visit: 2025     Encounter Diagnosis     ICD-10-CM    1. Lymphedema  I89.0           Dear Dr. Tristen Matos MD:    Thank you for your recent referral of Shonna Plascencia. Please review the attached evaluation summary from Shonna's recent visit.     Please verify that you agree with the plan of care by signing the attached order.     If you have any questions or concerns, please do not hesitate to call.     I sincerely appreciate the opportunity to share in the care of one of your patients and hope to have another opportunity to work with you in the near future.       Sincerely,    Angel Soares, PT      Referring Provider:      I certify that I have read the below Plan of Care and certify the need for these services furnished under this plan of treatment while under my care.                    Tristen Matos MD  [address] [facility] [address] [address]  Via Fax: [phone]          PT Re-Evaluation     Today's date: 2025  Patient name: Shonna Plascencia  : 1955  MRN: [id number]  Referring provider: Tristen Matos MD  Dx:   Encounter Diagnosis     ICD-10-CM    1. Lymphedema  I89.0                    Lymphedema Physical Assessment    SUBJECTIVE EVALUATION:  History of present illness:  Pt presents for examination stating her R arm has been pretty good, well managed w/ use of garments previously fitted for but c/o her newest gloves not fitting properly as her glove is not the same size despite using the same measurements as previous orders.     Pt reports a of sense of heaviness in R LE, feels R>L thigh girth is notable, this was reported in Feb assessment and also continues.      Pain (0-10): 3/10 - 510   Location: Upper arm/axilla L upper quadrant    Social Support:    Lives in: home, 2 story   Lives with: spouse    Occupation:  Employment status:   Retired  Physical job demands: n/a    OBJECTIVE:    Pulse: (0 no, 1+ diminished, 2+ mildly diminished, 3+ normal, 4 bounding)    Radial: 3+  Dorsal: 3+    Capillary Refill (=< 3 seconds is normal): normal    Palpation: Good overall mobility of R UE skin w/ mild edema presence well controlled at current state w/ use of daytime and nighttime garments    Pitting: Trace    Stemmer's Sign: Neg    Gait assessment: WNL    Transfer status: B UE A    Ability to don/doff clothing/garments: I    LE ROM: n/a  Knee flexion limitation:  Ankle DF/PF limitation:    UE ROM: WNL R UE - mild guarding/tension at end range OH L and R UE.   Shldr OH flex:  Shldr OH abd:  Shldr ER:  Shldr IR:  Elbow flex/ext:  Wrist flex/ext:    Strength:  UE:  LE:    Self management:  Home compression pump: no  Compression stockings: yes, day and night time  Lymphedema exercise: yes    Skin Assessment:  Skin mobility: Good t/o R UE  Fibrosis (0 normal - 4 >severe): 0-1  Erythema scale (1 very faint, 2 faint, 3 bright, 4 very bright): 1  Hemosiderin staining present:  Blister present: no   Location:   Color:   Size:  Wound present: no   Location:   Color:   Size:    Visualization:   Bony prominences: good  Tendon pathways: good thru R hand  Joint creases: good, minimal loss of visualization    I did visually inspect R and L LE to find no pitting into either LE, normal pulse and cap refill.  R thigh does appear slightly larger than L however this is skewed slightly by rolled pant legs.  Advise pt cont to monitor, will f/u prn.     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, Oculopharyngeal muscular dystrophy      R UE volume assessment 2414 mL on 2/6/2023  R UE volume   "assessment 2204 mL on 2024  R UE volume assessment 2053 mL on 2024      UPPER EXTREMITY RIGHT CALCULATIONS      Flowsheet Row Most Recent Value   Volume UE (mL)    Difference from last visit (mL)  -   Difference from first visit (mL)  -          LOWER EXTREMITY LEFT CALCULATIONS      Flowsheet Row Most Recent Value   Volume LE (mL) 6868   Difference from last visit (mL)  -6868   Difference from first visit (mL)  -6868          LOWER EXTREMITY RIGHT CALCULATIONS      Flowsheet Row Most Recent Value   Volume LE (mL) 7102   Difference from last visit (mL)  -7102   Difference from first visit (mL)  -7102              >> REFER TO FLOW SHEET FOR GIRTH MEASUREMENTS <<    ASSESSMENT:  Assessment details:  Pt presents for RA of chronic R UE lymphedema related to past CA tx after lumpectomy '10 w/ concern over most recent gloves \"not fitting properly\".   She also has cont'd c/o of R LE being larger than her L - this was noted at last assessment in .  She has cont'd unplanned wt loss she in part attributes to her dysphagia, Now 122 lbs- reported.   R UE cont'd w/ moderating and holding in volume per measurement this day.   I advise still the approx 6 mo f/u for RA pending when in need for new garments, schedule for .  Measurements this day do show R>L LE total volume w/ thigh measurements showing the most consistent variance R>L.  Physical assessment however does not suggest need for intervention at this time, instead cont to monitor for R LE edema concerns at 6 mo f/u unless sx's sig worsen before that time.  In regards to garment/glove concerns, pt did cut her tags out, making it difficult to discern when gloves were ordered.  Advised her to avoid this practice.  Given that pt had previous success in sizing w/ same garment measurements I advised avoiding change candy measurement form, will RA at time of garment re-order and make changes as necessary.     Goals:  ST. Pt to maintain " reduced volume of R upper arm over 6 months  2. Pt to maintain daily and nighttime garments   3. Reduce PN L UE <4/10 w/ all activity within 3 months    LT. Pt to order new day and night time gaments within 6 months  2. Maintain R UE reduced girth measurements thru upper arm over 6 months  3. Reduce PN <1/10 L UE within 6 months    Lymphedema life impact scale    Score: 51, 45, 49  % Impairment: 71%, 63%, 68%  Infection Occurrence: 0        PLAN:  Patient would benefit from: skilled physical therapy  Planned therapy interventions: manual therapy, massage, compression, home exercise program and patient education  Frequency: 1x/wk  Duration in visits: 1  Duration in weeks: 1  Plan of Care beginning date: 2025  Plan of Care expiration date: 2025  Treatment plan discussed with: patient       Precautions: Hx Breast CA, R UE lymphedema      Manuals 9-9-24 2-4-25 4-4                                    measurements  [facility]          Garment review   [facility]                                    Neuro Re-Ed                                                                                                        Ther Ex             Wall walk flex HEP            Wall walk abd HEP            Doorway str HEP            Pendulums HEP                                                                                                       Ther Activity                                       Gait Training                                       Modalities

## 2025-04-02 NOTE — PROGRESS NOTES
[facility]    NAME: Shonna Plascencia is a 69 y.o. female  : 1955    MRN: [id number]  DATE: 2025  TIME: 9:39 AM    Assessment and Plan   Foot dermatitis [L30.9]  1. Foot dermatitis  ketoconazole (NIZORAL) 2 % cream          Patient Instructions     Patient Instructions   Stop tea tree oil, aloe and Epsom salt soaks.    Wash foot daily with plain soap and water and dry with separate towel.  Use clean towel daily.  Apply topical antifungal cream as instructed.    Because your podiatrist is outside of the [facility] network, I do recommend that you take pictures of your left foot to document current rash so you can show your podiatrist.    Apply topical antifungal cream as instructed.    Chief Complaint     Chief Complaint   Patient presents with    Foot Pain     Left sided foot pain. Pt concerned for gout. Has a hx of gout. Foot is painful, swollen. Pain started 2-3 weeks ago. Pt has been trying to self treat the issue. Using episom salt / aloe / oils.        History of Present Illness   Shonna Plascencia presents to the clinic c/o  In with pain redness irritation left foot that started a couple weeks ago without known trigger.  History of gout and is concerned for possible gout flare or infection.    Using Epsom salt soaks, aloe, tea tree oils without any relief.    Does have podiatrist.    Was over getting lymphedema therapy at [facility] physical therapy next[place]door and decided to come in here for further evaluation of left foot pain.  Has worsened in the last couple days.        Review of Systems   Review of Systems   Constitutional: Negative.    Respiratory: Negative.     Cardiovascular: Negative.    Skin:  Positive for rash.       Current Medications     Long-Term Medications   Medication Sig Dispense Refill    ketoconazole (NIZORAL) 2 % cream Apply topically 2 (two) times a day 60 g 0    ALPRAZolam (XANAX) 0.25 mg tablet  (Patient not taking: Reported on 2024)      ascorbic acid (VITAMIN  C) 1000 MG tablet Take 1,000 mg by mouth daily      baclofen 10 mg tablet Take 10 mg by mouth 2 (two) times a day as needed      carbamide peroxide (DEBROX) 6.5 % otic solution Administer 5 drops into the left ear 2 (two) times a day 15 mL 0    Cholecalciferol 25 MCG (1000 UT) tablet Take by mouth      Colchicine 0.6 MG CAPS Take 1 capsule by mouth 2 (two) times a day (Patient not taking: Reported on 12/26/2024)      diclofenac (VOLTAREN) 75 mg EC tablet Take 75 mg by mouth 2 (two) times a day as needed (Patient not taking: Reported on 12/26/2024)      escitalopram (LEXAPRO) 10 mg tablet Take 1 tablet by mouth in the morning      famotidine (PEPCID) 40 MG tablet Take 1 tablet (40 mg total) by mouth daily at bedtime 30 tablet 3    Fluticasone-Salmeterol (Advair Diskus) 250-50 mcg/dose inhaler Inhale 1 puff 2 (two) times a day Rinse mouth after use. 60 blister 0    indomethacin (INDOCIN) 25 mg capsule TAKE 1 CAPSULE BY MOUTH TWICE DAILY WITH FOOD OR MILK (Patient not taking: Reported on 11/22/2023)      levothyroxine 50 mcg tablet Take 50 mcg by mouth daily      lidocaine (LIDODERM) 5 % Place 1 patch on the skin every 24 hours      loteprednol etabonate (LOTEMAX) 0.5 % ophthalmic suspension 1 drop 4 (four) times a day (Patient not taking: Reported on 11/22/2023)      meloxicam (MOBIC) 15 mg tablet Take by mouth      meloxicam (MOBIC) 15 mg tablet Take 1 tablet (15 mg total) by mouth daily (Patient not taking: Reported on 10/21/2021) 30 tablet 0    methocarbamol (ROBAXIN) 500 mg tablet Take by mouth      Omega-3 Fatty Acids (fish oil) 1,000 mg Take 2 g by mouth daily      ondansetron (ZOFRAN-ODT) 4 mg disintegrating tablet Take 4 mg by mouth      PreviDent 5000 Dry Mouth 1.1 % GEL BRUSH WITH TOOTH PASTE TWICE DAILY DO NOT RINSE AFTERWARDS.      Restasis 0.05 % ophthalmic emulsion INSTILL 1 DROP IN BOTH EYES TWICE DAILY      rosuvastatin (CRESTOR) 10 MG tablet Take 10 mg by mouth daily      sertraline (ZOLOFT) 100 mg  tablet Take 100 mg by mouth daily      tiZANidine (ZANAFLEX) 2 mg tablet Take 2 mg by mouth 3 (three) times a day as needed      triamcinolone (KENALOG) 0.5 % cream Apply topically 2 (two) times a day (Patient not taking: Reported on 12/26/2024) 15 g 1       Current Allergies     Allergies as of 04/02/2025 - Reviewed 04/02/2025   Allergen Reaction Noted    Benzethonium chloride Shortness Of Breath 01/17/2023    Enbucrilate Shortness Of Breath 07/20/2023    Fentanyl Shortness Of Breath 05/21/2014    Formaldehyde Headache, Other (See Comments), and Shortness Of Breath 04/14/2011    Petrolatum Other (See Comments) and Shortness Of Breath 11/09/2020    Chocolate - food allergy Headache 11/22/2023    Monosodium glutamate - food allergy GI Intolerance 04/14/2011    Cocoa extract (fruit) - food allergy Headache 11/22/2023    Doxycycline Nausea Only 04/02/2025    Flavoring agent Diarrhea, Headache, GI Intolerance, Nausea Only, Other (See Comments), and Vomiting 11/09/2020          The following portions of the patient's history were reviewed and updated as appropriate: allergies, current medications, past family history, past medical history, past social history, past surgical history and problem list.  Past Medical History:   Diagnosis Date    Cancer (HCC)     breast    Cataract     Droopy eyelid     Eye swelling, bilateral     MS (multiple sclerosis) (HCC)     OCULAR PHARANZEAL    Oculopharyngeal muscular dystrophy (HCC)     Ptosis     Tinnitus      Past Surgical History:   Procedure Laterality Date    APPENDECTOMY  2013    BREAST LUMPECTOMY Right 2010    CATARACT EXTRACTION, BILATERAL      COLON SIGMOID RESECTION      TONSILLECTOMY      TOTAL ABDOMINAL HYSTERECTOMY  2012    WISDOM TOOTH EXTRACTION       Family History   Problem Relation Age of Onset    Heart disease Mother     Dysphagia Mother     Cancer Mother     Heart disease Father     Heart disease Brother        Objective   /82   Pulse 68   Temp 97.8 °F  "(36.6 °C) (Tympanic)   Resp 15   Ht 5' 1\" (1.549 m)   Wt 55.3 kg (122 lb)   SpO2 98%   BMI 23.05 kg/m²   No LMP recorded. Patient is postmenopausal.       Physical Exam     Physical Exam  Vitals and nursing note reviewed.   Constitutional:       General: She is not in acute distress.     Appearance: She is well-developed. She is not ill-appearing, toxic-appearing or diaphoretic.   HENT:      Head: Normocephalic and atraumatic.   Cardiovascular:      Rate and Rhythm: Normal rate.   Pulmonary:      Effort: Pulmonary effort is normal.   Skin:     General: Skin is warm and dry.      Findings: Erythema and rash present.      Comments: L foot with redness, scaling plantar> dorsal surfaces.  See multiple photos.   Neurological:      Mental Status: She is alert and oriented to person, place, and time.   Psychiatric:         Mood and Affect: Mood normal.         Behavior: Behavior normal.                           "

## 2025-06-16 ENCOUNTER — EVALUATION (OUTPATIENT)
Dept: PHYSICAL THERAPY | Facility: CLINIC | Age: 70
End: 2025-06-16
Attending: INTERNAL MEDICINE
Payer: MEDICARE

## 2025-06-16 DIAGNOSIS — I89.0 LYMPHEDEMA: Primary | ICD-10-CM

## 2025-06-16 PROCEDURE — 97163 PT EVAL HIGH COMPLEX 45 MIN: CPT | Performed by: PHYSICAL THERAPIST

## 2025-06-16 NOTE — LETTER
2025    Tristen Matos MD  [address] [facility] [place] [address]    Patient: Shonna Plascencia   YOB: 1955   Date of Visit: 2025     Encounter Diagnosis     ICD-10-CM    1. Lymphedema  I89.0           Dear Dr. Tristen Matos MD:    Thank you for your recent referral of Shonna Plascencia. Please review the attached evaluation summary from Shonna's recent visit.     Please verify that you agree with the plan of care by signing the attached order.     If you have any questions or concerns, please do not hesitate to call.     I sincerely appreciate the opportunity to share in the care of one of your patients and hope to have another opportunity to work with you in the near future.       Sincerely,    nAgel Soares, PT      Referring Provider:      I certify that I have read the below Plan of Care and certify the need for these services furnished under this plan of treatment while under my care.                    Tristen Matos MD  [facility] [facility] [address] [address]  Via Fax: [phone]          PT Evaluation     Today's date: 2025  Patient name: Shonna Plascencia  : 1955  MRN: [id number]  Referring provider: Tristen Matos MD  Dx:   Encounter Diagnosis     ICD-10-CM    1. Lymphedema  I89.0                    Lymphedema Physical Assessment    SUBJECTIVE EVALUATION:  History of present illness:  Pt presents for examination stating her R arm has been pretty good, well managed w/ use of garments previously fitted for but c/o her newest gloves not fitting properly as her glove is not the same size despite using the same measurements as previous orders.  Wants to have measurements checked this day to order new daytime and nighttime garments.   Pt reports a of sense of heaviness in R LE, feels R>L thigh girth is notable, this was reported in Feb and April assessment and also continues.      Pain (0-10): 310 - 5/10   Location: Upper arm/axilla L upper  quadrant    Social Support:    Lives in: home, 2 story   Lives with: spouse    Occupation:  Employment status:  Retired  Physical job demands: n/a    OBJECTIVE:    Pulse: (0 no, 1+ diminished, 2+ mildly diminished, 3+ normal, 4 bounding)    Radial: 3+  Dorsal: 3+    Capillary Refill (=< 3 seconds is normal): normal    Palpation: Good overall mobility of R UE skin w/ mild edema presence well controlled at current state w/ use of daytime and nighttime garments    Pitting: Trace    Stemmer's Sign: Neg    Gait assessment: WNL    Transfer status: B UE A    Ability to don/doff clothing/garments: I    LE ROM: n/a  Knee flexion limitation:  Ankle DF/PF limitation:    UE ROM: WNL R UE - mild guarding/tension at end range OH L and R UE.   Shldr OH flex:  Shldr OH abd:  Shldr ER:  Shldr IR:  Elbow flex/ext:  Wrist flex/ext:    Strength:  UE:  LE:    Self management:  Home compression pump: no  Compression stockings: yes, day and night time  Lymphedema exercise: yes    Skin Assessment:  Skin mobility: Good t/o R UE  Fibrosis (0 normal - 4 >severe): 0-1  Erythema scale (1 very faint, 2 faint, 3 bright, 4 very bright): 1  Hemosiderin staining present:  Blister present: no   Location:   Color:   Size:  Wound present: no   Location:   Color:   Size:    Visualization:   Bony prominences: good  Tendon pathways: good thru R hand  Joint creases: good, minimal loss of visualization    I did visually inspect R and L LE to find no pitting into either LE, normal pulse and cap refill.  R thigh does appear slightly larger than L however this is skewed slightly by rolled pant legs.  Advise pt cont to monitor, will f/u prn.     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      >> REFER TO FLOW SHEET FOR GIRTH MEASUREMENTS <<    ASSESSMENT:  Assessment details:  Pt presents for RA of chronic R UE lymphedema related to past CA tx after lumpectomy '10 w/ concern over garment fitment and wanting to be re-chedked and measured before ordering additional new garments.   She also has cont'd c/o of R LE being larger than her L - this was noted at last assessment in  and again in  w/ little overall change in that time.  She has cont'd unplanned wt loss she in part attributes to her dysphagia, Now 122 lbs- reported.   R UE cont'd w/ moderating and holding in volume per measurement this day.   I advise still the approx 6 mo f/u for RA following being fit for new garments this day.   In regards to pt's R LE edema c/o - Physical assessment  does not suggest need for intervention at this time, instead cont to monitor for R LE edema concerns at 6 mo f/u unless sx's sig worsen before that time.  In regards to garment/glove concerns, pt did cut her tags out, making it difficult to discern when gloves were ordered.  Advised her to avoid this practice.  Given that pt had previous success in sizing w/ same garment measurements I avoided marked change but did adjust for wrist and thumb comfort and for mild changes on total arm volume.  Pt to manage submission w/ [employer] for garment order.     Goals:  ST. Pt to maintain reduced volume of R upper arm over 6 months  2. Pt to maintain daily and nighttime garments   3. Reduce PN L UE <4/10 w/ all activity within 3 months    LT. Pt to order new day and night time gaments within 2 months  2. Maintain R UE reduced girth measurements thru upper arm over 6 months  3. Reduce PN <1/10 L  UE within 6 months    Lymphedema life impact scale    Score: 51, 45, 49  % Impairment: 71%, 63%, 68%  Infection Occurrence: 0        PLAN:  Patient would benefit from: skilled physical therapy  Planned therapy interventions: manual therapy, massage, compression, home exercise program and patient education  Frequency: 1x/wk  Duration in visits: 1  Duration in weeks: 1  Plan of Care beginning date: 6/16/2025  Plan of Care expiration date: 6/23/2025  Treatment plan discussed with: patient       Precautions: Hx Breast CA, R UE lymphedema      Manuals 9-9-24 2-4-25 4-4 6/16                                   measurements  [facility]         Garment review   [facility] - fit for glove, sleeves                                   Neuro Re-Ed                                                                                                        Ther Ex             Wall walk flex HEP            Wall walk abd HEP            Doorway str HEP            Pendulums HEP                                                                                                       Ther Activity                                       Gait Training                                       Modalities

## 2025-06-16 NOTE — PROGRESS NOTES
PT Evaluation     Today's date: 2025  Patient name: Shonna Plascencia  : 1955  MRN: [id number]  Referring provider: Tristen Matos MD  Dx:   Encounter Diagnosis     ICD-10-CM    1. Lymphedema  I89.0                    Lymphedema Physical Assessment    SUBJECTIVE EVALUATION:  History of present illness:  Pt presents for examination stating her R arm has been pretty good, well managed w/ use of garments previously fitted for but c/o her newest gloves not fitting properly as her glove is not the same size despite using the same measurements as previous orders.  Wants to have measurements checked this day to order new daytime and nighttime garments.   Pt reports a of sense of heaviness in R LE, feels R>L thigh girth is notable, this was reported in Feb and April assessment and also continues.      Pain (0-10): 3/10 - 5/10   Location: Upper arm/axilla L upper quadrant    Social Support:    Lives in: home, 2 story   Lives with: spouse    Occupation:  Employment status:  Retired  Physical job demands: n/a    OBJECTIVE:    Pulse: (0 no, 1+ diminished, 2+ mildly diminished, 3+ normal, 4 bounding)    Radial: 3+  Dorsal: 3+    Capillary Refill (=< 3 seconds is normal): normal    Palpation: Good overall mobility of R UE skin w/ mild edema presence well controlled at current state w/ use of daytime and nighttime garments    Pitting: Trace    Stemmer's Sign: Neg    Gait assessment: WNL    Transfer status: B UE A    Ability to don/doff clothing/garments: I    LE ROM: n/a  Knee flexion limitation:  Ankle DF/PF limitation:    UE ROM: WNL R UE - mild guarding/tension at end range OH L and R UE.   Shldr OH flex:  Shldr OH abd:  Shldr ER:  Shldr IR:  Elbow flex/ext:  Wrist flex/ext:    Strength:  UE:  LE:    Self management:  Home compression pump: no  Compression stockings: yes, day and night time  Lymphedema exercise: yes    Skin Assessment:  Skin mobility: Good t/o R UE  Fibrosis (0 normal - 4 >severe): 0-1  Erythema scale  (1 very faint, 2 faint, 3 bright, 4 very bright): 1  Hemosiderin staining present:  Blister present: no   Location:   Color:   Size:  Wound present: no   Location:   Color:   Size:    Visualization:   Bony prominences: good  Tendon pathways: good thru R hand  Joint creases: good, minimal loss of visualization    I did visually inspect R and L LE to find no pitting into either LE, normal pulse and cap refill.  R thigh does appear slightly larger than L however this is skewed slightly by rolled pant legs.  Advise pt cont to monitor, will f/u prn.     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      >> REFER TO FLOW SHEET FOR GIRTH MEASUREMENTS <<    ASSESSMENT:  Assessment details:  Pt presents for RA of chronic R UE lymphedema related to past CA tx after lumpectomy '10 w/ concern over garment fitment and wanting to be re-chedked and measured before ordering additional new garments.   She also has cont'd c/o of R LE being larger than her L - this was noted at last assessment in Feb '25 and again in April '25 w/ little overall change in that time.  She has cont'd unplanned wt loss she in part attributes to her dysphagia, Now 122 lbs- reported.   R UE cont'd w/ moderating and holding in volume per measurement this day.   I advise still the approx 6 mo f/u for RA following being fit for new garments this day.   In regards to pt's R LE edema c/o - Physical assessment  does not suggest need for  intervention at this time, instead cont to monitor for R LE edema concerns at 6 mo f/u unless sx's sig worsen before that time.  In regards to garment/glove concerns, pt did cut her tags out, making it difficult to discern when gloves were ordered.  Advised her to avoid this practice.  Given that pt had previous success in sizing w/ same garment measurements I avoided marked change but did adjust for wrist and thumb comfort and for mild changes on total arm volume.  Pt to manage submission w/ [employer] for garment order.     Goals:  ST. Pt to maintain reduced volume of R upper arm over 6 months  2. Pt to maintain daily and nighttime garments   3. Reduce PN L UE <4/10 w/ all activity within 3 months    LT. Pt to order new day and night time gaments within 2 months  2. Maintain R UE reduced girth measurements thru upper arm over 6 months  3. Reduce PN <1/10 L UE within 6 months    Lymphedema life impact scale    Score: 51, 45, 49  % Impairment: 71%, 63%, 68%  Infection Occurrence: 0        PLAN:  Patient would benefit from: skilled physical therapy  Planned therapy interventions: manual therapy, massage, compression, home exercise program and patient education  Frequency: 1x/wk  Duration in visits: 1  Duration in weeks: 1  Plan of Care beginning date: 2025  Plan of Care expiration date: 2025  Treatment plan discussed with: patient       Precautions: Hx Breast CA, R UE lymphedema      Manuals 9-9-24 2-4-25 4-4                                    measurements  [facility]         Garment review   [facility] - fit for glove, sleeves                                   Neuro Re-Ed                                                                                                        Ther Ex             Wall walk flex HEP            Wall walk abd HEP            Doorway str HEP            Pendulums HEP                                                                                                        Ther Activity                                       Gait Training                                       Modalities

## 2025-06-25 ENCOUNTER — OFFICE VISIT (OUTPATIENT)
Dept: URGENT CARE | Facility: CLINIC | Age: 70
End: 2025-06-25
Payer: MEDICARE

## 2025-06-25 VITALS
RESPIRATION RATE: 20 BRPM | SYSTOLIC BLOOD PRESSURE: 110 MMHG | OXYGEN SATURATION: 97 % | HEART RATE: 76 BPM | DIASTOLIC BLOOD PRESSURE: 76 MMHG | TEMPERATURE: 97.4 F

## 2025-06-25 DIAGNOSIS — R09.82 POST-NASAL DRIP: ICD-10-CM

## 2025-06-25 DIAGNOSIS — J31.0 RHINITIS, UNSPECIFIED TYPE: Primary | ICD-10-CM

## 2025-06-25 PROCEDURE — G0463 HOSPITAL OUTPT CLINIC VISIT: HCPCS | Performed by: PHYSICIAN ASSISTANT

## 2025-06-25 PROCEDURE — 99213 OFFICE O/P EST LOW 20 MIN: CPT | Performed by: PHYSICIAN ASSISTANT

## 2025-06-25 RX ORDER — FLUTICASONE PROPIONATE 50 MCG
SPRAY, SUSPENSION (ML) NASAL
Qty: 15.8 ML | Refills: 0 | Status: SHIPPED | OUTPATIENT
Start: 2025-06-25

## 2025-06-25 RX ORDER — PREDNISONE 20 MG/1
TABLET ORAL
Qty: 10 TABLET | Refills: 0 | Status: SHIPPED | OUTPATIENT
Start: 2025-06-25

## 2025-06-25 NOTE — PROGRESS NOTES
[facility]    NAME: Shonna Plascencia is a 70 y.o. female  : 1955    MRN: [id number]  DATE: 2025  TIME: 10:22 AM    Assessment and Plan   Rhinitis, unspecified type [J31.0]  1. Rhinitis, unspecified type  predniSONE 20 mg tablet    fluticasone (FLONASE) 50 mcg/act nasal spray      2. Post-nasal drip  fluticasone (FLONASE) 50 mcg/act nasal spray            ------------------------------------------------  Coding LOS Based On:    Prescription drug management: Yes.    External data review: Yes.  Pulmonology visit from May 7, 2025.    Problems that influence decision making: History pulmonary fibrosis, emphysema.  History of multiple sclerosis.  ------------------------------------------------    Patient Instructions     Patient Instructions   Take prednisone as instructed.  While on prednisone do not take any ibuprofen or ibuprofen like products.  May safely take Tylenol if needed.     Use Flonase nasal spray as instructed.    Significant worsening of symptoms I would recommend further evaluation in emergency room otherwise contact your pulmonologist to arrange follow-up for approximately 1 to 2 weeks for reevaluation.      Chief Complaint     Chief Complaint   Patient presents with    Cough     Pt C/O a chronic bedtime cough at night. Pt reports using additional pillows to help.   Pt reports C/O difficulty with walking, swallowing, fatigue, and agitation.        History of Present Illness   Shonna Plascencia presents to the clinic c/o  Coughing and chest congestion    Started: Patient says she has a chronic cough   Associated signs and symptoms: She feels more fatigued.  She says she has increased runny nose that is clear.  She has increased postnasal drip.  Not sleeping well due to drainage.  Is tired.  Trouble swallowing with liquids and solids worse over the last couple weeks.    Modifying factors: Using 2 pillows at night but says that does not seem to help.  Doing nasal saline  flushes.    History pulmonary fibrosis and emphysema.  Smoker: Yes.    Only uses her Wixela as needed per patient report.    Follows with pulmonologist.  Appointment in the near future for treatment of her esophagus.        Review of Systems   Review of Systems   Constitutional:  Positive for activity change.   HENT:  Positive for congestion, postnasal drip, rhinorrhea and trouble swallowing. Negative for sinus pressure, sinus pain and voice change.    Respiratory:  Positive for cough, chest tightness and shortness of breath. Negative for wheezing and stridor.    Gastrointestinal:  Negative for abdominal pain.        Trouble swallowing liquids and solids       Current Medications   Long-Term Medications[1]    Current Allergies     Allergies as of 06/25/2025 - Reviewed 06/25/2025   Allergen Reaction Noted    Benzethonium chloride Shortness Of Breath 01/17/2023    Enbucrilate Shortness Of Breath 07/20/2023    Fentanyl Shortness Of Breath 05/21/2014    Formaldehyde Headache, Other (See Comments), and Shortness Of Breath 04/14/2011    Petrolatum Other (See Comments) and Shortness Of Breath 11/09/2020    Chocolate - food allergy Headache 11/22/2023    Monosodium glutamate - food allergy GI Intolerance 04/14/2011    Cocoa extract (fruit) - food allergy Headache 11/22/2023    Doxycycline Nausea Only 04/02/2025    Flavoring agent Diarrhea, Headache, GI Intolerance, Nausea Only, Other (See Comments), and Vomiting 11/09/2020          The following portions of the patient's history were reviewed and updated as appropriate: allergies, current medications, past family history, past medical history, past social history, past surgical history and problem list.  Past Medical History[2]  Past Surgical History[3]  Family History[4]    Objective   /76 (BP Location: Right arm, Patient Position: Sitting, Cuff Size: Standard)   Pulse 76   Temp (!) 97.4 °F (36.3 °C) (Tympanic)   Resp 20   SpO2 97%   No LMP recorded. Patient is  postmenopausal.       Physical Exam     Physical Exam  Vitals and nursing note reviewed.   Constitutional:       General: She is not in acute distress.     Appearance: She is not ill-appearing, toxic-appearing or diaphoretic.      Comments: Older female with mild ataxic gait.  Speech is normal without trismus or conversational dyspnea.   HENT:      Head: Normocephalic and atraumatic.      Nose: Nose normal. No congestion or rhinorrhea.      Mouth/Throat:      Mouth: Mucous membranes are moist.      Pharynx: No oropharyngeal exudate or posterior oropharyngeal erythema.     Eyes:      General: No scleral icterus.        Right eye: No discharge.         Left eye: No discharge.      Extraocular Movements: Extraocular movements intact.      Conjunctiva/sclera: Conjunctivae normal.      Pupils: Pupils are equal, round, and reactive to light.       Cardiovascular:      Rate and Rhythm: Normal rate and regular rhythm.      Heart sounds: Normal heart sounds. No murmur heard.     No friction rub. No gallop.   Pulmonary:      Effort: Pulmonary effort is normal. No respiratory distress.      Breath sounds: Normal breath sounds. No stridor. No wheezing, rhonchi or rales.     Musculoskeletal:      Cervical back: Normal range of motion and neck supple. No rigidity or tenderness.   Lymphadenopathy:      Cervical: No cervical adenopathy.     Skin:     General: Skin is warm and dry.      Coloration: Skin is not pale.     Neurological:      Mental Status: She is alert.     Psychiatric:         Mood and Affect: Mood normal.         Behavior: Behavior normal.                  [1]   Long-Term Medications   Medication Sig Dispense Refill    ascorbic acid (VITAMIN C) 1000 MG tablet Take 1,000 mg by mouth in the morning.      baclofen 10 mg tablet Take 10 mg by mouth as needed in the morning and 10 mg as needed in the evening.      escitalopram (LEXAPRO) 10 mg tablet Take 1 tablet by mouth in the morning      fluticasone (FLONASE) 50  mcg/act nasal spray 1 spray each nostril daily 15.8 mL 0    levothyroxine 50 mcg tablet Take 50 mcg by mouth in the morning.      lidocaine (LIDODERM) 5 % Place 1 patch on the skin every 24 hours      Omega-3 Fatty Acids (fish oil) 1,000 mg Take 2 g by mouth in the morning.      ondansetron (ZOFRAN-ODT) 4 mg disintegrating tablet Take 4 mg by mouth      PreviDent 5000 Dry Mouth 1.1 % GEL       Restasis 0.05 % ophthalmic emulsion       rosuvastatin (CRESTOR) 10 MG tablet Take 10 mg by mouth in the morning.      ALPRAZolam (XANAX) 0.25 mg tablet  (Patient not taking: Reported on 12/26/2023)      carbamide peroxide (DEBROX) 6.5 % otic solution Administer 5 drops into the left ear 2 (two) times a day 15 mL 0    Cholecalciferol 25 MCG (1000 UT) tablet Take by mouth      Colchicine 0.6 MG CAPS Take 1 capsule by mouth 2 (two) times a day (Patient not taking: Reported on 12/26/2024)      diclofenac (VOLTAREN) 75 mg EC tablet Take 75 mg by mouth 2 (two) times a day as needed (Patient not taking: Reported on 6/25/2025)      famotidine (PEPCID) 40 MG tablet Take 1 tablet (40 mg total) by mouth daily at bedtime 30 tablet 3    Fluticasone-Salmeterol (Advair Diskus) 250-50 mcg/dose inhaler Inhale 1 puff 2 (two) times a day Rinse mouth after use. 60 blister 0    indomethacin (INDOCIN) 25 mg capsule TAKE 1 CAPSULE BY MOUTH TWICE DAILY WITH FOOD OR MILK (Patient not taking: Reported on 11/22/2023)      ketoconazole (NIZORAL) 2 % cream Apply topically 2 (two) times a day (Patient not taking: Reported on 6/25/2025) 60 g 0    loteprednol etabonate (LOTEMAX) 0.5 % ophthalmic suspension 1 drop 4 (four) times a day (Patient not taking: Reported on 6/25/2025)      meloxicam (MOBIC) 15 mg tablet Take by mouth      meloxicam (MOBIC) 15 mg tablet Take 1 tablet (15 mg total) by mouth daily (Patient not taking: Reported on 10/21/2021) 30 tablet 0    methocarbamol (ROBAXIN) 500 mg tablet Take by mouth      sertraline (ZOLOFT) 100 mg tablet Take  100 mg by mouth daily      tiZANidine (ZANAFLEX) 2 mg tablet Take 2 mg by mouth 3 (three) times a day as needed      triamcinolone (KENALOG) 0.5 % cream Apply topically 2 (two) times a day (Patient not taking: Reported on 12/26/2024) 15 g 1   [2]   Past Medical History:  Diagnosis Date    Cancer (HCC)     breast    Cataract     Droopy eyelid     Eye swelling, bilateral     MS (multiple sclerosis) (HCC)     OCULAR PHARANZEAL    Oculopharyngeal muscular dystrophy (HCC)     Ptosis     Tinnitus    [3]   Past Surgical History:  Procedure Laterality Date    APPENDECTOMY  2013    BREAST LUMPECTOMY Right 2010    CATARACT EXTRACTION, BILATERAL      COLON SIGMOID RESECTION      TONSILLECTOMY      TOTAL ABDOMINAL HYSTERECTOMY  2012    WISDOM TOOTH EXTRACTION     [4]   Family History  Problem Relation Name Age of Onset    Heart disease Mother      Dysphagia Mother      Cancer Mother      Heart disease Father      Heart disease Brother

## 2025-06-25 NOTE — PATIENT INSTRUCTIONS
Take prednisone as instructed.  While on prednisone do not take any ibuprofen or ibuprofen like products.  May safely take Tylenol if needed.     Use Flonase nasal spray as instructed.    Significant worsening of symptoms I would recommend further evaluation in emergency room otherwise contact your pulmonologist to arrange follow-up for approximately 1 to 2 weeks for reevaluation.

## 2025-07-15 ENCOUNTER — EVALUATION (OUTPATIENT)
Dept: PHYSICAL THERAPY | Facility: CLINIC | Age: 70
End: 2025-07-15
Attending: INTERNAL MEDICINE
Payer: MEDICARE

## 2025-07-15 DIAGNOSIS — I89.0 LYMPHEDEMA: Primary | ICD-10-CM

## 2025-07-15 PROCEDURE — 97163 PT EVAL HIGH COMPLEX 45 MIN: CPT | Performed by: PHYSICAL THERAPIST

## 2025-07-23 ENCOUNTER — APPOINTMENT (OUTPATIENT)
Dept: PHYSICAL THERAPY | Facility: CLINIC | Age: 70
End: 2025-07-23
Payer: MEDICARE